# Patient Record
Sex: MALE | Race: WHITE | HISPANIC OR LATINO | Employment: OTHER | URBAN - METROPOLITAN AREA
[De-identification: names, ages, dates, MRNs, and addresses within clinical notes are randomized per-mention and may not be internally consistent; named-entity substitution may affect disease eponyms.]

---

## 2017-01-04 ENCOUNTER — HOSPITAL ENCOUNTER (EMERGENCY)
Facility: HOSPITAL | Age: 47
Discharge: HOME | End: 2017-01-05
Admitting: EMERGENCY MEDICINE
Payer: COMMERCIAL

## 2017-01-04 ENCOUNTER — APPOINTMENT (EMERGENCY)
Dept: RADIOLOGY | Facility: HOSPITAL | Age: 47
End: 2017-01-04
Payer: COMMERCIAL

## 2017-01-04 VITALS
TEMPERATURE: 97.8 F | OXYGEN SATURATION: 96 % | DIASTOLIC BLOOD PRESSURE: 70 MMHG | WEIGHT: 180 LBS | SYSTOLIC BLOOD PRESSURE: 144 MMHG | RESPIRATION RATE: 16 BRPM | HEART RATE: 98 BPM

## 2017-01-04 DIAGNOSIS — R09.81 NASAL CONGESTION: Primary | ICD-10-CM

## 2017-01-04 DIAGNOSIS — R05.9 COUGH: ICD-10-CM

## 2017-01-04 PROCEDURE — 71020 HB CHEST X-RAY 2VW FRONTAL&LATL: CPT

## 2017-01-05 PROCEDURE — 99283 EMERGENCY DEPT VISIT LOW MDM: CPT

## 2017-01-13 ENCOUNTER — ALLSCRIPTS OFFICE VISIT (OUTPATIENT)
Dept: OTHER | Facility: OTHER | Age: 47
End: 2017-01-13

## 2017-01-13 DIAGNOSIS — R51.9 HEADACHE: ICD-10-CM

## 2017-01-13 DIAGNOSIS — S06.9X9A INTRACRANIAL INJURY WITH LOSS OF CONSCIOUSNESS (HCC): ICD-10-CM

## 2017-01-13 DIAGNOSIS — M62.81 MUSCLE WEAKNESS (GENERALIZED): ICD-10-CM

## 2017-01-13 DIAGNOSIS — Z13.220 ENCOUNTER FOR SCREENING FOR LIPOID DISORDERS: ICD-10-CM

## 2017-02-02 ENCOUNTER — HOSPITAL ENCOUNTER (OUTPATIENT)
Dept: MRI IMAGING | Facility: HOSPITAL | Age: 47
Discharge: HOME | End: 2017-02-02
Payer: COMMERCIAL

## 2017-02-02 DIAGNOSIS — S06.9X9A INTRACRANIAL INJURY WITH LOSS OF CONSCIOUSNESS (HCC): ICD-10-CM

## 2017-02-02 DIAGNOSIS — R51.9 HEADACHE: ICD-10-CM

## 2017-02-02 DIAGNOSIS — M62.81 MUSCLE WEAKNESS (GENERALIZED): ICD-10-CM

## 2017-02-02 PROCEDURE — 70553 MRI BRAIN STEM W/O & W/DYE: CPT

## 2017-02-02 PROCEDURE — A9585 GADOBUTROL INJECTION: HCPCS | Performed by: PHYSICIAN ASSISTANT

## 2017-02-02 RX ADMIN — GADOBUTROL 10 ML: 604.72 INJECTION INTRAVENOUS at 21:00

## 2017-02-06 ENCOUNTER — GENERIC CONVERSION - ENCOUNTER (OUTPATIENT)
Dept: OTHER | Facility: OTHER | Age: 47
End: 2017-02-06

## 2017-02-14 ENCOUNTER — GENERIC CONVERSION - ENCOUNTER (OUTPATIENT)
Dept: OTHER | Facility: OTHER | Age: 47
End: 2017-02-14

## 2017-10-09 ENCOUNTER — APPOINTMENT (EMERGENCY)
Dept: RADIOLOGY | Facility: HOSPITAL | Age: 47
End: 2017-10-09
Payer: COMMERCIAL

## 2017-10-09 ENCOUNTER — APPOINTMENT (EMERGENCY)
Dept: CT IMAGING | Facility: HOSPITAL | Age: 47
End: 2017-10-09
Payer: COMMERCIAL

## 2017-10-09 ENCOUNTER — HOSPITAL ENCOUNTER (EMERGENCY)
Facility: HOSPITAL | Age: 47
Discharge: HOME | End: 2017-10-10
Attending: EMERGENCY MEDICINE | Admitting: EMERGENCY MEDICINE
Payer: COMMERCIAL

## 2017-10-09 VITALS
HEART RATE: 80 BPM | WEIGHT: 241.1 LBS | RESPIRATION RATE: 18 BRPM | SYSTOLIC BLOOD PRESSURE: 146 MMHG | TEMPERATURE: 99.3 F | OXYGEN SATURATION: 97 % | DIASTOLIC BLOOD PRESSURE: 86 MMHG

## 2017-10-09 DIAGNOSIS — R73.9 HYPERGLYCEMIA: ICD-10-CM

## 2017-10-09 DIAGNOSIS — S06.0X9A CONCUSSION: Primary | ICD-10-CM

## 2017-10-09 DIAGNOSIS — S63.501A RIGHT WRIST SPRAIN: ICD-10-CM

## 2017-10-09 LAB
ANION GAP BLD CALC-SCNC: 17 MMOL/L (ref 4–13)
BUN BLD-MCNC: 14 MG/DL (ref 5–25)
CA-I BLD-SCNC: 1.14 MMOL/L (ref 1.12–1.32)
CHLORIDE BLD-SCNC: 102 MMOL/L (ref 100–108)
CREAT BLD-MCNC: 1.3 MG/DL (ref 0.6–1.3)
GFR SERPL CREATININE-BSD FRML MDRD: 65 ML/MIN/1.73SQ M
GLUCOSE SERPL-MCNC: 226 MG/DL (ref 65–140)
GLUCOSE SERPL-MCNC: 250 MG/DL (ref 65–140)
HCT VFR BLD CALC: 42 % (ref 36.5–49.3)
HGB BLDA-MCNC: 14.3 G/DL (ref 12–17)
PCO2 BLD: 27 MMOL/L (ref 21–32)
POTASSIUM BLD-SCNC: 3.6 MMOL/L (ref 3.5–5.3)
SODIUM BLD-SCNC: 141 MMOL/L (ref 136–145)
SPECIMEN SOURCE: ABNORMAL

## 2017-10-09 PROCEDURE — 85014 HEMATOCRIT: CPT

## 2017-10-09 PROCEDURE — 73502 X-RAY EXAM HIP UNI 2-3 VIEWS: CPT

## 2017-10-09 PROCEDURE — 93005 ELECTROCARDIOGRAM TRACING: CPT | Performed by: EMERGENCY MEDICINE

## 2017-10-09 PROCEDURE — 82948 REAGENT STRIP/BLOOD GLUCOSE: CPT

## 2017-10-09 PROCEDURE — 73110 X-RAY EXAM OF WRIST: CPT

## 2017-10-09 PROCEDURE — 80047 BASIC METABLC PNL IONIZED CA: CPT

## 2017-10-09 PROCEDURE — 70450 CT HEAD/BRAIN W/O DYE: CPT

## 2017-10-09 RX ORDER — QUETIAPINE FUMARATE 25 MG/1
25 TABLET, FILM COATED ORAL
COMMUNITY
End: 2018-01-31 | Stop reason: DRUGHIGH

## 2017-10-09 RX ORDER — DIVALPROEX SODIUM 125 MG/1
250 TABLET, DELAYED RELEASE ORAL EVERY 8 HOURS SCHEDULED
COMMUNITY
End: 2018-01-31 | Stop reason: DRUGHIGH

## 2017-10-10 PROCEDURE — 99285 EMERGENCY DEPT VISIT HI MDM: CPT

## 2017-10-10 NOTE — ED NOTES
Patient ambulatory to restroom with steady coordinated gait          [address], RN  10/09/17 2008

## 2017-10-10 NOTE — ED ATTENDING ATTESTATION
Carrie Jenkins DO, saw and evaluated the patient  I have discussed the patient with the resident/non-physician practitioner and agree with the resident's/non-physician practitioner's findings, Plan of Care, and MDM as documented in the resident's/non-physician practitioner's note, except where noted  All available labs and Radiology studies were reviewed  At this point I agree with the current assessment done in the Emergency Department  I have conducted an independent evaluation of this patient a history and physical is as follows:  Patient with trip and fall while walking  Since fall, patient with persistent dizziness and right hip pain  Denies numbness or tingling  The patient denies any preceding lightheadedness, dizziness, chest pain, pressure, shortness of breath focal neurologic deficit  The patient has right wrist pain since the fall on his outstretched hands  The remainder of a 10 point review of systems was otherwise unremarkable  On examination, the patient is awake alert no acute distress  The patient has some mild tenderness to the superior aspect of the right parietal region  There is no deformity consistent with a skull fracture  The patient does not have hemotympanum or CSF leakage from the bilateral ears  There is no midline vertebral tenderness in the patient has full range of motion without pain in the midline cervical spine  The patient does have some associated right paracervical muscle tenderness  Lungs are clear to auscultation bilaterally  Heart is regular without murmurs rubs or gallops  The abdomen is soft and nontender without rebound or guarding  The patient has mild tenderness without deformity to the right wrist and right hip  The patient has grossly normal neurologic function without focal deficit  The patient's mood and affect are appropriate    I reviewed the x-rays and laboratories with resident    We discussed the possible small radial styloid fracture the patient will be placed in a splint for with plan for orthopedic follow-up    Diagnosis:  Concussion, right wrist sprain, possible fracture          Plan for [facility], labs, EKG, x-rays    Critical Care Time  CritCare Time

## 2017-10-10 NOTE — DISCHARGE INSTRUCTIONS
Concussion   WHAT YOU NEED TO KNOW:   A concussion is a mild brain injury  It is usually caused by a bump or blow to the head from a fall, a motor vehicle crash, or a sports injury  Sometimes being shaken forcefully may cause a concussion  DISCHARGE INSTRUCTIONS:   Have someone else call 911 for the following:   · Someone tries to wake you and cannot do so  · You have a seizure, increasing confusion, or a change in personality  · Your speech becomes slurred, or you have new vision problems  Return to the emergency department if:   · You have a severe headache that does not go away  · You have arm or leg weakness, numbness, or new problems with coordination  · You have blood or clear fluid coming out of the ears or nose  Contact your healthcare provider if:   · You have nausea or are vomiting  · You feel more sleepy than usual     · Your symptoms get worse  · Your symptoms last longer than 6 weeks after the injury  · You have questions or concerns about your condition or care  Medicines:   · Acetaminophen  helps to decrease pain  It is available without a doctor's order  Ask how much to take and how often to take it  Follow directions  Acetaminophen can cause liver damage if not taken correctly  · NSAIDs , such as ibuprofen, help decrease swelling and pain  NSAIDs can cause stomach bleeding or kidney problems in certain people  If you take blood thinner medicine, always ask your healthcare provider if NSAIDs are safe for you  Always read the medicine label and follow directions  · Take your medicine as directed  Contact your healthcare provider if you think your medicine is not helping or if you have side effects  Tell him or her if you are allergic to any medicine  Keep a list of the medicines, vitamins, and herbs you take  Include the amounts, and when and why you take them  Bring the list or the pill bottles to follow-up visits   Carry your medicine list with you in case of an emergency  Follow up with your healthcare provider as directed:  Write down your questions so you remember to ask them during your visits  Self-care:   · Rest  from physical and mental activities as directed  Mental activities are those that require thinking, concentration, and attention  You will need to rest until your symptoms are gone  Rest will allow you to recover from your concussion  Ask your healthcare provider when you can return to work and other daily activities  · Have someone stay with you for the first 24 hours after your injury  Your healthcare provider should be contacted if your symptoms get worse, or you develop new symptoms  · Do not participate in sports and physical activities until your healthcare provider says it is okay  They could make your symptoms worse or lead to another concussion  Your healthcare provider will tell you when it is okay for you to return to sports or physical activities  Prevent another concussion:   · Wear protective sports equipment that fit properly  Helmets help decrease your risk of a serious brain injury  Talk to your healthcare provider about ways you can decrease your risk for a concussion if you play sports  · Wear your seat belt  every time you travel  This helps to decrease your risk of a head injury if you are in a car accident  © 2017 [facility] Information is for End User's use only and may not be sold, redistributed or otherwise used for commercial purposes  All illustrations and images included in CareNotes® are the copyrighted property of [employer] , [employer]  or Adarsh Manzo  The above information is an  only  It is not intended as medical advice for individual conditions or treatments  Talk to your doctor, nurse or pharmacist before following any medical regimen to see if it is safe and effective for you

## 2017-10-10 NOTE — ED NOTES
Per Dr Kelvin Mcdermott, pt to receive PO fluids at this time       Jennifer Sosa, REMBERTO  10/09/17 5351

## 2017-10-10 NOTE — ED NOTES
Jagdish Childress, ED tech at bedside to place splint on patient       Sagrario Soto RN  10/10/17 4076

## 2017-10-10 NOTE — ED PROVIDER NOTES
History  Chief Complaint   Patient presents with    Dizziness     EMS found pt on sidewalk, pt reports getting dizzy and falling down  Pt reports hx of "dizzy spells"  Reports hitting head and +LOC, denies taking thinners  This is a 70-year-old male presenting to the emergency department for dizziness lightheadedness after a fall  The patient was walking in the dark and tripped over a curb  He fell onto his outstretched right hand and his right side and also hit his head  He states that since then he has had some dizziness and lightheadedness  He has had no blurry vision double vision or numbness weakness or tingling  Prior to Admission Medications   Prescriptions Last Dose Informant Patient Reported? Taking? QUEtiapine (SEROquel) 25 mg tablet   Yes Yes   Sig: Take 25 mg by mouth daily at bedtime   divalproex sodium (DEPAKOTE) 125 mg EC tablet   Yes Yes   Sig: Take 250 mg by mouth every 8 (eight) hours      Facility-Administered Medications: None       History reviewed  No pertinent past medical history  History reviewed  No pertinent surgical history  History reviewed  No pertinent family history  I have reviewed and agree with the history as documented  Social History   Substance Use Topics    Smoking status: Never Smoker    Smokeless tobacco: Not on file    Alcohol use No        Review of Systems   Constitutional: Negative for appetite change, chills, fatigue and fever  HENT: Negative for sneezing and sore throat  Eyes: Negative for visual disturbance  Respiratory: Negative for cough, choking, chest tightness, shortness of breath and wheezing  Cardiovascular: Negative for chest pain and palpitations  Gastrointestinal: Negative for abdominal pain, constipation, diarrhea, nausea and vomiting  Genitourinary: Negative for difficulty urinating and dysuria  Musculoskeletal: Positive for arthralgias  Neurological: Positive for dizziness and light-headedness  Negative for weakness, numbness and headaches  All other systems reviewed and are negative  Physical Exam  ED Triage Vitals [10/09/17 1946]   Temperature Pulse Respirations Blood Pressure SpO2   99 3 °F (37 4 °C) 89 16 (!) 171/98 100 %      Temp Source Heart Rate Source Patient Position - Orthostatic VS BP Location FiO2 (%)   Oral Monitor Lying Right arm --      Pain Score       5           Physical Exam   Constitutional: He is oriented to person, place, and time  He appears well-developed and well-nourished  No distress  HENT:   Head: Normocephalic and atraumatic  Mouth/Throat: Oropharynx is clear and moist    Eyes: EOM are normal  Pupils are equal, round, and reactive to light  Neck: No JVD present  No tracheal deviation present  Cardiovascular: Normal rate, regular rhythm, normal heart sounds and intact distal pulses  Exam reveals no gallop and no friction rub  No murmur heard  Pulmonary/Chest: Effort normal and breath sounds normal  No respiratory distress  He has no wheezes  He has no rales  Abdominal: Soft  Bowel sounds are normal  He exhibits no distension  There is no tenderness  There is no rebound and no guarding  Musculoskeletal:        Arms:  Neurological: He is alert and oriented to person, place, and time  He has normal strength  No cranial nerve deficit or sensory deficit  He exhibits normal muscle tone  GCS eye subscore is 4  GCS verbal subscore is 5  GCS motor subscore is 6  Skin: Skin is warm and dry  He is not diaphoretic  No pallor  Psychiatric: He has a normal mood and affect  His behavior is normal    Nursing note and vitals reviewed        ED Medications  Medications - No data to display    Diagnostic Studies  Labs Reviewed   POCT GLUCOSE - Abnormal        Result Value Ref Range Status    POC Glucose 226 (*) 65 - 140 mg/dl Final   POCT CHEM 8+ - Abnormal     Anion Gap, Istat 17 (*) 4 - 13 mmol/L Final    Glucose, i-STAT 250 (*) 65 - 140 mg/dl Final    SODIUM, I-STAT 141  136 - 145 mmol/l Final    Potassium, i-STAT 3 6  3 5 - 5 3 mmol/L Final    Chloride, istat 102  100 - 108 mmol/L Final    CO2, i-STAT 27  21 - 32 mmol/L Final    Calcium, Ionized i-STAT 1 14  1 12 - 1 32 mmol/L Final    BUN, I-STAT 14  5 - 25 mg/dl Final    Creatinine, i-STAT 1 3  0 6 - 1 3 mg/dl Final    eGFR 65  ml/min/1 73sq m Final    Hct, i-STAT 42  36 5 - 49 3 % Final    Hgb, i-STAT 14 3  12 0 - 17 0 g/dl Final    Specimen Type VENOUS   Final       CT head without contrast   Final Result      No acute intracranial abnormality  Workstation performed: [id number]         XR wrist 3+ views RIGHT   ED Interpretation   Bone fragment noted just distal to the ulnar styloid  This is   age indeterminate  XR hip/pelv 2-3 vws right if performed   ED Interpretation   No acute fracture or dislocation          Procedures  Procedures      Phone Consults  ED Phone Contact    ED Course  ED Course                                MDM  Number of Diagnoses or Management Options  Concussion:   Hyperglycemia:   Right wrist sprain:   Diagnosis management comments: 51-year-old male with dizziness and lightheadedness after fall with positive head strike  No focal neurological deficits or midline neck tenderness No external signs of trauma  Likely mild concussion  Will CT head, x-ray painful joints, recheck EKG and labs given pre-hospital report of hyperglycemia    CritCare Time    Disposition  Final diagnoses:   Concussion   Right wrist sprain   Hyperglycemia     ED Disposition     ED Disposition Condition Comment    Discharge  Mechele Sides discharge to home/self care      Condition at discharge: Stable        Follow-up Information     Follow up With Specialties Details Why [address] , [address] Nurse Practitioner Call  [facility] [address]  Call As needed [facility]  [address] [address]  [phone]    MAGALYdhavalmoira Samanta, [address] [facility] Nurse Practitioner   [facility] [address]  [place] [address]  [phone]          Discharge Medication List as of 10/9/2017 11:51 PM      CONTINUE these medications which have NOT CHANGED    Details   divalproex sodium (DEPAKOTE) 125 mg EC tablet Take 250 mg by mouth every 8 (eight) hours, Historical Med      QUEtiapine (SEROquel) 25 mg tablet Take 25 mg by mouth daily at bedtime, Historical Med           No discharge procedures on file  ED Provider  Attending physically available and evaluated Mayuri Orozco I managed the patient along with the ED Attending      Electronically Signed by       Ashlie Raymundo MD  Resident  10/10/17 1361

## 2017-10-11 LAB
ATRIAL RATE: 92 BPM
P AXIS: 58 DEGREES
PR INTERVAL: 132 MS
QRS AXIS: 16 DEGREES
QRSD INTERVAL: 68 MS
QT INTERVAL: 348 MS
QTC INTERVAL: 430 MS
T WAVE AXIS: 35 DEGREES
VENTRICULAR RATE: 92 BPM

## 2017-10-13 ENCOUNTER — GENERIC CONVERSION - ENCOUNTER (OUTPATIENT)
Dept: OTHER | Facility: OTHER | Age: 47
End: 2017-10-13

## 2017-10-13 DIAGNOSIS — S06.0X0A CONCUSSION WITHOUT LOSS OF CONSCIOUSNESS: ICD-10-CM

## 2017-10-13 DIAGNOSIS — H53.9 VISUAL DISTURBANCE: ICD-10-CM

## 2017-10-16 ENCOUNTER — TRANSCRIBE ORDERS (OUTPATIENT)
Dept: ADMINISTRATIVE | Facility: HOSPITAL | Age: 47
End: 2017-10-16

## 2017-10-16 DIAGNOSIS — S06.0X0A CONCUSSION WITHOUT LOSS OF CONSCIOUSNESS, INITIAL ENCOUNTER: Primary | ICD-10-CM

## 2017-10-17 ENCOUNTER — TRANSCRIBE ORDERS (OUTPATIENT)
Dept: LAB | Facility: CLINIC | Age: 47
End: 2017-10-17

## 2017-10-17 ENCOUNTER — APPOINTMENT (OUTPATIENT)
Dept: LAB | Facility: CLINIC | Age: 47
End: 2017-10-17
Payer: COMMERCIAL

## 2017-10-17 ENCOUNTER — ALLSCRIPTS OFFICE VISIT (OUTPATIENT)
Dept: OTHER | Facility: OTHER | Age: 47
End: 2017-10-17

## 2017-10-17 DIAGNOSIS — M62.830 MUSCLE SPASM OF BACK: ICD-10-CM

## 2017-10-17 DIAGNOSIS — R94.6 ABNORMAL RESULTS OF THYROID FUNCTION STUDIES: ICD-10-CM

## 2017-10-17 LAB
T3FREE SERPL-MCNC: 3.07 PG/ML (ref 2.3–4.2)
T4 FREE SERPL-MCNC: 1.05 NG/DL (ref 0.76–1.46)
TSH SERPL DL<=0.05 MIU/L-ACNC: 2.42 UIU/ML (ref 0.36–3.74)

## 2017-10-17 PROCEDURE — 84443 ASSAY THYROID STIM HORMONE: CPT

## 2017-10-17 PROCEDURE — 84481 FREE ASSAY (FT-3): CPT

## 2017-10-17 PROCEDURE — 84439 ASSAY OF FREE THYROXINE: CPT

## 2017-10-17 PROCEDURE — 36415 COLL VENOUS BLD VENIPUNCTURE: CPT

## 2017-10-18 ENCOUNTER — GENERIC CONVERSION - ENCOUNTER (OUTPATIENT)
Dept: OTHER | Facility: OTHER | Age: 47
End: 2017-10-18

## 2017-10-19 NOTE — PROGRESS NOTES
Assessment  1  Back muscle spasm (724 8) (M62 830)   2  Diabetes mellitus type II, controlled (250 00) (E11 9)   3  Vitamin D deficiency (268 9) (E55 9)   4  Abnormal TSH (790 6) (R94 6)   5  Diabetic eye exam (V72 0,250 00) (E11 9,Z01 00)   6  Headache (784 0) (R51)   7  Encounter for diabetic foot exam (250 00) (E11 9)    Plan  Abnormal TSH    · (1) FREE T3; Status:Active; Requested HGE:[id number];    · (1) T4, FREE; Status:Active; Requested for:17Oct2017;    · (1) TSH; Status:Active; Requested for:17Oct2017;   Back muscle spasm    · *1 - [facility]  *  Status: Active  Requested for: [id number]  Care Summary provided  : Yes  Diabetes mellitus type II, controlled    · Atorvastatin Calcium 10 MG Oral Tablet; TAKE 1 TABLET DAILY   · Lisinopril 5 MG Oral Tablet; take 1 tablet every day   · MetFORMIN HCl - 500 MG Oral Tablet; TAKE 1 TABLET TWICE DAILY  Diabetic eye exam    · *VB - Eye Exam; Status:Active; Requested for:17Oct2017;    · [address] (OPHTHALMOLOGY ) Co-Management  *  Status:  Active  Requested for: 12DBJ4168  Care Summary provided  : Yes  Encounter for diabetic foot exam    · *VB - Foot Exam; Status:Complete;   Done: 99QZF5970 12:14PM  Headache    · Baclofen 10 MG Oral Tablet; TAKE 1 TABLET 3 TIMES DAILY   · Ibuprofen 800 MG Oral Tablet; take 1 tablet every 8 hours prn pain  Vitamin D deficiency    · Vitamin D (Ergocalciferol) 39925 UNIT Oral Capsule; TAKE ONE CAPSULE BY  MOUTH WEEKLY    Discussion/Summary    Discussed with patient that back injuries may take several weeks to fully heal  We will refill ibuprofen for headaches and for the back pain  Also prescribed baclofen to see if that helps with symptoms  Gave referral to physical therapy further evaluation and treatment  with patient that his A1c is 7 2 and at this is a diagnosis of diabetes   Informed him at this time will start him on metformin, lisinopril, atorvastatin for control symptoms and also to decrease risk of MI and CVA  Discussed the importance of diet and exercise  Informed him that if he is able to control his diabetes with diet and exercise that we would be able to take him off the medications  The patient handout on appropriate diet  Gave referral to [facility] for diabetic eye exam patient that vitamin D was low  Discussed sources of vitamin-D  Since his vitamin-D was below 20 [place] start him on 89919 units weekly  patient that TSH was low  However there were no results for T4 so will check lab work to verify if there is any hypothyroidism and if so will start on medication  in 3 months  Possible side effects of new medications were reviewed with the patient/guardian today  The treatment plan was reviewed with the patient/guardian  The patient/guardian understands and agrees with the treatment plan     Self Referrals: No      Chief Complaint  Pt is here for refill med like ibuprofen also pt states has hurt his back yesterday falling into a hole on the sidewalk  History of Present Illness  53 y/o M presenting for low back pain  Patient states on 10/09/2017 he was walking and tripped on a hole in the sidewalk  He then went to the emergency room where he was given Tylenol and prednisone to help with his back pain  Imaging was done and came out negative  Patient has had back pain in the past, but since the fall has been getting worse  Describes as a throbbing sensation on the lower right side of his back  Pain can radiate up to his right shoulder  Believes the pain has been getting worse since the fall last week  States that excess walking has been making the pain worse  Denies any thing that makes the pain better  also had lab work recently done, and once know the results  Review of Systems    Constitutional: no fever,-- not feeling poorly,-- no chills-- and-- not feeling tired  Eyes: no eye pain-- and-- no eyesight problems     ENT: no earache,-- no sore throat,-- no hearing loss-- and-- no nasal discharge  Cardiovascular: No complaints of slow heart rate, no fast heart rate, no chest pain, no palpitations, no leg claudication, no lower extremity  Respiratory: No complaints of shortness of breath, no wheezing, no cough, no SOB on exertion, no orthopnea or PND  Gastrointestinal: No complaints of abdominal pain, no constipation, no nausea or vomiting, no diarrhea or bloody stools  Genitourinary: no dysuria,-- no urinary hesitancy-- and-- no nocturia  Musculoskeletal: as noted in HPI  Integumentary: no rashes-- and-- no skin lesions  Neurological: numbness-- and-- limb weakness, but-- no headache,-- no tingling-- and-- no dizziness  Psychiatric: no anxiety-- and-- no depression  Hematologic/Lymphatic: no tendency for easy bleeding-- and-- no tendency for easy bruising  ROS reviewed  Active Problems  1  Back muscle spasm (724 8) (M62 830)   2  Blurry vision (368 8) (H53 8)   3  Bronchitis (490) (J40)   4  Concussion without loss of consciousness, initial encounter (850 0) (S06 0X0A)   5  Headache (784 0) (R51)   6  Insomnia (780 52) (G47 00)   7  Need for prophylactic vaccination and inoculation against influenza (V04 81) (Z23)   8  Post-concussion headache (339 20) (G44 309)   9  Psychosis (298 9) (F29)   10  Screening for hyperlipidemia (V77 91) (Z13 220)   11  TBI (traumatic brain injury) (854 00) (S06 9X9A)   12  Visual disturbances (368 9) (H53 9)   13  Weakness of left side of body (728 87) (R53 1)    Past Medical History  1  Denied: History of drug abuse    Surgical History  1  History of Elevation Of Depressed Skull Fracture    Family History  Father    1  Family history of diabetes mellitus (V18 0) (Z83 3)    Social History   · Never smoker   · No alcohol use   · No Hindu beliefs    Current Meds   1  Depakote TBEC Recorded   2  Divalproex Sodium 500 MG Oral Tablet Delayed Release; TAKE 2 TABLETS AT BEDTIME;    Therapy: 75EOA5266 to (Evaluate:56Kin3815) Requested for: 87EHN8355; Last   Rx:13Jan2017 Ordered   3  Magnesium Oxide 400 MG Oral Tablet; TAKE 1 TABLET DAILY; Therapy: 39QWR4847 to (Evaluate:51Mqr4066)  Requested for: 06ZFQ1552; Last   Rx:13Oct2017 Ordered   4  PredniSONE 20 MG Oral Tablet; TAKE 1 TABLET DAILY x5 days then 1/2 tab daily x2 days; Therapy: 37VOM9361 to (Last Rx:13Oct2017)  Requested for: 13Oct2017 Ordered   5  QUEtiapine Fumarate 200 MG Oral Tablet; Take 1 tablet twice daily; Therapy: 36ZEI0290 to (Evaluate:26Cxt4785)  Requested for: 24XMY2832; Last   Rx:13Jan2017 Ordered   6  SEROquel 50 MG Oral Tablet Recorded    Allergies  1  No Known Drug Allergies    Vitals  Vital Signs    Recorded: 40NJB3194 09:08AM   Temperature 96 F, Tympanic   Heart Rate 66   Respiration 18   Systolic 457   Diastolic 80   Height 5 ft 6 in   Weight 244 lb    BMI Calculated 39 38   BSA Calculated 2 18   O2 Saturation 98     Physical Exam    Constitutional   General appearance: Abnormal   well developed,-- well nourished-- and-- obese  Eyes   Conjunctiva and lids: No swelling, erythema, or discharge  Pupils and irises: Equal, round and reactive to light  Ears, Nose, Mouth, and Throat   External inspection of ears and nose: Normal     Otoscopic examination: Tympanic membrance translucent with normal light reflex  Canals patent without erythema  Nasal mucosa, septum, and turbinates: Normal without edema or erythema  Oropharynx: Normal with no erythema, edema, exudate or lesions  Pulmonary   Respiratory effort: No increased work of breathing or signs of respiratory distress  Auscultation of lungs: Clear to auscultation, equal breath sounds bilaterally, no wheezes, no rales, no rhonci  Cardiovascular   Auscultation of heart: Normal rate and rhythm, normal S1 and S2, without murmurs  Examination of extremities for edema and/or varicosities: Normal     Carotid pulses: Normal     Abdomen   Abdomen: Non-tender, no masses      Liver and spleen: No hepatomegaly or splenomegaly  Lymphatic   Palpation of lymph nodes in neck: No lymphadenopathy  Musculoskeletal   Gait and station: Normal     Inspection/palpation of joints, bones, and muscles: Abnormal  -- Tenderness to palpation in right lower paraspinal area  Muscle tension noted in that area  Strength is 5/5 in right lower extremity and 4/5 in left lower extremity  Skin   Skin and subcutaneous tissue: Normal without rashes or lesions  Neurologic   Cranial nerves: Cranial nerves 2-12 intact  Reflexes: 2+ and symmetric  Sensation: Abnormal  -- Decreased sensation to light touch in left foot  Psychiatric   Orientation to person, place and time: Normal     Mood and affect: Normal         Socks and shoes removed, Right Foot Findings: dry  The right toes were normal  Normal tactile sensation with monofilament testing throughout the right foot  Socks and shoes removed, Left Foot Findings: dry  The left toes were normal  Diminished tactile sensation with monofilament testing throughout the left foot  Pulses:   1+ in the posterior tibialis on the right   1+ in the dorsalis pedis on the right  Pulses:   1+ in the posterior tibialis on the left   1+ in the dorsalis pedis on the left        Signatures   Electronically signed by : NICKY Holt; Oct 17 2017 12:16PM EST                       (Author)    Electronically signed by : CORRIE Reynoso ; Oct 18 2017  1:09PM EST

## 2017-11-13 ENCOUNTER — APPOINTMENT (OUTPATIENT)
Dept: PHYSICAL THERAPY | Facility: MEDICAL CENTER | Age: 47
End: 2017-11-13
Payer: COMMERCIAL

## 2017-11-13 PROCEDURE — G8991 OTHER PT/OT GOAL STATUS: HCPCS

## 2017-11-13 PROCEDURE — 97162 PT EVAL MOD COMPLEX 30 MIN: CPT

## 2017-11-13 PROCEDURE — G8990 OTHER PT/OT CURRENT STATUS: HCPCS

## 2017-11-17 ENCOUNTER — APPOINTMENT (OUTPATIENT)
Dept: PHYSICAL THERAPY | Facility: MEDICAL CENTER | Age: 47
End: 2017-11-17
Payer: COMMERCIAL

## 2017-11-20 ENCOUNTER — APPOINTMENT (OUTPATIENT)
Dept: PHYSICAL THERAPY | Facility: MEDICAL CENTER | Age: 47
End: 2017-11-20
Payer: COMMERCIAL

## 2017-11-20 PROCEDURE — 97110 THERAPEUTIC EXERCISES: CPT

## 2017-11-20 PROCEDURE — 97014 ELECTRIC STIMULATION THERAPY: CPT

## 2017-11-24 ENCOUNTER — APPOINTMENT (OUTPATIENT)
Dept: PHYSICAL THERAPY | Facility: MEDICAL CENTER | Age: 47
End: 2017-11-24
Payer: COMMERCIAL

## 2017-11-27 ENCOUNTER — APPOINTMENT (OUTPATIENT)
Dept: PHYSICAL THERAPY | Facility: MEDICAL CENTER | Age: 47
End: 2017-11-27
Payer: COMMERCIAL

## 2017-12-01 ENCOUNTER — APPOINTMENT (OUTPATIENT)
Dept: PHYSICAL THERAPY | Facility: MEDICAL CENTER | Age: 47
End: 2017-12-01
Payer: COMMERCIAL

## 2017-12-05 ENCOUNTER — APPOINTMENT (OUTPATIENT)
Dept: PHYSICAL THERAPY | Facility: MEDICAL CENTER | Age: 47
End: 2017-12-05
Payer: COMMERCIAL

## 2017-12-05 PROCEDURE — 97014 ELECTRIC STIMULATION THERAPY: CPT

## 2017-12-05 PROCEDURE — 97110 THERAPEUTIC EXERCISES: CPT

## 2017-12-08 ENCOUNTER — APPOINTMENT (OUTPATIENT)
Dept: PHYSICAL THERAPY | Facility: MEDICAL CENTER | Age: 47
End: 2017-12-08
Payer: COMMERCIAL

## 2017-12-11 ENCOUNTER — APPOINTMENT (OUTPATIENT)
Dept: PHYSICAL THERAPY | Facility: MEDICAL CENTER | Age: 47
End: 2017-12-11
Payer: COMMERCIAL

## 2017-12-11 PROCEDURE — 97110 THERAPEUTIC EXERCISES: CPT

## 2017-12-11 PROCEDURE — 97014 ELECTRIC STIMULATION THERAPY: CPT

## 2017-12-15 ENCOUNTER — APPOINTMENT (OUTPATIENT)
Dept: PHYSICAL THERAPY | Facility: MEDICAL CENTER | Age: 47
End: 2017-12-15
Payer: COMMERCIAL

## 2017-12-18 ENCOUNTER — APPOINTMENT (OUTPATIENT)
Dept: PHYSICAL THERAPY | Facility: MEDICAL CENTER | Age: 47
End: 2017-12-18
Payer: COMMERCIAL

## 2017-12-19 ENCOUNTER — APPOINTMENT (OUTPATIENT)
Dept: PHYSICAL THERAPY | Facility: MEDICAL CENTER | Age: 47
End: 2017-12-19
Payer: COMMERCIAL

## 2017-12-19 PROCEDURE — 97110 THERAPEUTIC EXERCISES: CPT

## 2017-12-19 PROCEDURE — 97014 ELECTRIC STIMULATION THERAPY: CPT

## 2017-12-19 PROCEDURE — 97140 MANUAL THERAPY 1/> REGIONS: CPT

## 2017-12-21 ENCOUNTER — APPOINTMENT (OUTPATIENT)
Dept: PHYSICAL THERAPY | Facility: MEDICAL CENTER | Age: 47
End: 2017-12-21
Payer: COMMERCIAL

## 2017-12-21 PROCEDURE — 97010 HOT OR COLD PACKS THERAPY: CPT

## 2017-12-21 PROCEDURE — 97110 THERAPEUTIC EXERCISES: CPT

## 2017-12-21 PROCEDURE — 97140 MANUAL THERAPY 1/> REGIONS: CPT

## 2017-12-26 ENCOUNTER — APPOINTMENT (OUTPATIENT)
Dept: PHYSICAL THERAPY | Facility: MEDICAL CENTER | Age: 47
End: 2017-12-26
Payer: COMMERCIAL

## 2017-12-26 PROCEDURE — 97014 ELECTRIC STIMULATION THERAPY: CPT

## 2017-12-26 PROCEDURE — 97110 THERAPEUTIC EXERCISES: CPT

## 2017-12-28 ENCOUNTER — APPOINTMENT (OUTPATIENT)
Dept: PHYSICAL THERAPY | Facility: MEDICAL CENTER | Age: 47
End: 2017-12-28
Payer: COMMERCIAL

## 2017-12-28 PROCEDURE — 97014 ELECTRIC STIMULATION THERAPY: CPT

## 2017-12-28 PROCEDURE — G8991 OTHER PT/OT GOAL STATUS: HCPCS | Performed by: PHYSICAL THERAPIST

## 2017-12-28 PROCEDURE — 97140 MANUAL THERAPY 1/> REGIONS: CPT

## 2017-12-28 PROCEDURE — G8990 OTHER PT/OT CURRENT STATUS: HCPCS | Performed by: PHYSICAL THERAPIST

## 2017-12-28 PROCEDURE — 97110 THERAPEUTIC EXERCISES: CPT

## 2018-01-04 ENCOUNTER — APPOINTMENT (OUTPATIENT)
Dept: LAB | Facility: CLINIC | Age: 48
End: 2018-01-04
Payer: COMMERCIAL

## 2018-01-04 ENCOUNTER — GENERIC CONVERSION - ENCOUNTER (OUTPATIENT)
Dept: OTHER | Facility: OTHER | Age: 48
End: 2018-01-04

## 2018-01-04 ENCOUNTER — ALLSCRIPTS OFFICE VISIT (OUTPATIENT)
Dept: OTHER | Facility: OTHER | Age: 48
End: 2018-01-04

## 2018-01-04 ENCOUNTER — APPOINTMENT (OUTPATIENT)
Dept: PHYSICAL THERAPY | Facility: MEDICAL CENTER | Age: 48
End: 2018-01-04
Payer: COMMERCIAL

## 2018-01-04 ENCOUNTER — TRANSCRIBE ORDERS (OUTPATIENT)
Dept: LAB | Facility: CLINIC | Age: 48
End: 2018-01-04

## 2018-01-04 DIAGNOSIS — R53.1 WEAKNESS: ICD-10-CM

## 2018-01-04 DIAGNOSIS — E11.9 TYPE 2 DIABETES MELLITUS WITHOUT COMPLICATIONS (HCC): ICD-10-CM

## 2018-01-04 DIAGNOSIS — M54.9 DORSALGIA: ICD-10-CM

## 2018-01-04 LAB
ALBUMIN SERPL BCP-MCNC: 4 G/DL (ref 3.5–5)
ALP SERPL-CCNC: 96 U/L (ref 46–116)
ALT SERPL W P-5'-P-CCNC: 60 U/L (ref 12–78)
ANION GAP SERPL CALCULATED.3IONS-SCNC: 6 MMOL/L (ref 4–13)
AST SERPL W P-5'-P-CCNC: 33 U/L (ref 5–45)
BACTERIA UR QL AUTO: ABNORMAL /HPF
BASOPHILS # BLD AUTO: 0.04 THOUSANDS/ΜL (ref 0–0.1)
BASOPHILS NFR BLD AUTO: 1 % (ref 0–1)
BILIRUB SERPL-MCNC: 0.58 MG/DL (ref 0.2–1)
BILIRUB UR QL STRIP: NEGATIVE
BUN SERPL-MCNC: 13 MG/DL (ref 5–25)
CALCIUM SERPL-MCNC: 9.1 MG/DL (ref 8.3–10.1)
CHLORIDE SERPL-SCNC: 104 MMOL/L (ref 100–108)
CHOLEST SERPL-MCNC: 207 MG/DL (ref 50–200)
CLARITY UR: CLEAR
CO2 SERPL-SCNC: 27 MMOL/L (ref 21–32)
COLOR UR: YELLOW
CREAT SERPL-MCNC: 1.11 MG/DL (ref 0.6–1.3)
CREAT UR-MCNC: 176 MG/DL
EOSINOPHIL # BLD AUTO: 0.51 THOUSAND/ΜL (ref 0–0.61)
EOSINOPHIL NFR BLD AUTO: 9 % (ref 0–6)
ERYTHROCYTE [DISTWIDTH] IN BLOOD BY AUTOMATED COUNT: 13.8 % (ref 11.6–15.1)
GFR SERPL CREATININE-BSD FRML MDRD: 79 ML/MIN/1.73SQ M
GLUCOSE P FAST SERPL-MCNC: 122 MG/DL (ref 65–99)
GLUCOSE UR STRIP-MCNC: NEGATIVE MG/DL
HBA1C MFR BLD HPLC: 6.9 %
HCT VFR BLD AUTO: 46.2 % (ref 36.5–49.3)
HDLC SERPL-MCNC: 43 MG/DL (ref 40–60)
HGB BLD-MCNC: 15 G/DL (ref 12–17)
HGB UR QL STRIP.AUTO: NEGATIVE
HYALINE CASTS #/AREA URNS LPF: ABNORMAL /LPF
KETONES UR STRIP-MCNC: NEGATIVE MG/DL
LDLC SERPL CALC-MCNC: 138 MG/DL (ref 0–100)
LEUKOCYTE ESTERASE UR QL STRIP: ABNORMAL
LYMPHOCYTES # BLD AUTO: 2.26 THOUSANDS/ΜL (ref 0.6–4.47)
LYMPHOCYTES NFR BLD AUTO: 39 % (ref 14–44)
MCH RBC QN AUTO: 26.9 PG (ref 26.8–34.3)
MCHC RBC AUTO-ENTMCNC: 32.5 G/DL (ref 31.4–37.4)
MCV RBC AUTO: 83 FL (ref 82–98)
MICROALBUMIN UR-MCNC: 18.2 MG/L (ref 0–20)
MICROALBUMIN/CREAT 24H UR: 10 MG/G CREATININE (ref 0–30)
MONOCYTES # BLD AUTO: 0.4 THOUSAND/ΜL (ref 0.17–1.22)
MONOCYTES NFR BLD AUTO: 7 % (ref 4–12)
NEUTROPHILS # BLD AUTO: 2.59 THOUSANDS/ΜL (ref 1.85–7.62)
NEUTS SEG NFR BLD AUTO: 44 % (ref 43–75)
NITRITE UR QL STRIP: NEGATIVE
NON-SQ EPI CELLS URNS QL MICRO: ABNORMAL /HPF
NRBC BLD AUTO-RTO: 0 /100 WBCS
PH UR STRIP.AUTO: 6 [PH] (ref 4.5–8)
PLATELET # BLD AUTO: 277 THOUSANDS/UL (ref 149–390)
PMV BLD AUTO: 10.4 FL (ref 8.9–12.7)
POTASSIUM SERPL-SCNC: 4.2 MMOL/L (ref 3.5–5.3)
PROT SERPL-MCNC: 8.3 G/DL (ref 6.4–8.2)
PROT UR STRIP-MCNC: NEGATIVE MG/DL
RBC # BLD AUTO: 5.57 MILLION/UL (ref 3.88–5.62)
RBC #/AREA URNS AUTO: ABNORMAL /HPF
SODIUM SERPL-SCNC: 137 MMOL/L (ref 136–145)
SP GR UR STRIP.AUTO: 1.02 (ref 1–1.03)
TRIGL SERPL-MCNC: 128 MG/DL
UROBILINOGEN UR QL STRIP.AUTO: 0.2 E.U./DL
WBC # BLD AUTO: 5.85 THOUSAND/UL (ref 4.31–10.16)
WBC #/AREA URNS AUTO: ABNORMAL /HPF

## 2018-01-04 PROCEDURE — 80053 COMPREHEN METABOLIC PANEL: CPT

## 2018-01-04 PROCEDURE — 82570 ASSAY OF URINE CREATININE: CPT

## 2018-01-04 PROCEDURE — 87086 URINE CULTURE/COLONY COUNT: CPT

## 2018-01-04 PROCEDURE — 80061 LIPID PANEL: CPT

## 2018-01-04 PROCEDURE — 81001 URINALYSIS AUTO W/SCOPE: CPT

## 2018-01-04 PROCEDURE — 82043 UR ALBUMIN QUANTITATIVE: CPT

## 2018-01-04 PROCEDURE — 85025 COMPLETE CBC W/AUTO DIFF WBC: CPT

## 2018-01-04 PROCEDURE — 36415 COLL VENOUS BLD VENIPUNCTURE: CPT

## 2018-01-05 NOTE — PROGRESS NOTES
Assessment   1  Chronic back pain (724 5,338 29) (M54 9,G89 29)   2  Diabetes mellitus type II, controlled (250 00) (E11 9)   3  Visual disturbances (368 9) (H53 9)    Plan   Chronic back pain    · Methocarbamol 500 MG Oral Tablet; TAKE 1 TABLET 3 TIMES DAILY   · Naproxen 500 MG Oral Tablet; take 1 tablet every 12 hours with food as needed   · *1 - [facility]  *  Status: Active  Requested for: 57XSR6851  Care Summary provided  : Yes  Diabetes mellitus type II, controlled    · MetFORMIN HCl - 500 MG Oral Tablet; Take 1 tablet daily   · Hemoglobin A1c- POC; Status:Complete - Retrospective By Protocol Authorization;      Done: 21GHW4936 11:11AM  Headache    · Baclofen 10 MG Oral Tablet   · Ibuprofen 800 MG Oral Tablet    Discussion/Summary      Discussed with patient that A1c has decreased from 7 2% to 6 9%  Discussed with patient that diet and exercise is important in controlling his diabetes  At this time recommend that he still take medication  Was able to have patient agree to taking metformin once a day to decrease the amount of medications that he is taking, in hopes to increase his compliance  Discussed the importance of taking lisinopril and atorvastatin to prevent complications from his diabetes  Complications can include CVA or MI  with patient the importance of following up with ophthalmology for blurry vision  there has been little improvement in his back pain with current medications and also physical therapy will order MRI of spine for further evaluation  Will change medication to naproxen and Robaxin to see if this helps with symptoms  Continue following up with physical therapy for treatment of back pain  Depending on results of MRI will refer to Pain Management  Possible side effects of new medications were reviewed with the patient/guardian today  The treatment plan was reviewed with the patient/guardian   The patient/guardian understands and agrees with the treatment plan Self Referrals: No      Chief Complaint   Patient presents today for routine DM FU, also c/o back pain, currently on therapy  History of Present Illness   53 yo M with history of T2 DM, chronic low back pain s/p injury and depressed skull fracture presenting for follow up  At his last visit, the patient was prescribed Ibuprofen, Baclofen and given a referral to physical therapy for his back pain  He states the Ibuprofen provides only mild relief and he has not been taking the Baclofen regularly  The Ibuprofen is helping his headaches, however  Patient has attended 9 sessions of physical therapy also with mild relief and was told he would need another referral to complete another 9 sessions  He would like something stronger for his back pain  At last visit the patient's A1c was found to be 7 2 and he was diagnosed with diabetes  He was prescribed Metformin, Atorvastatin and Lisinopril however states he has not been compliant with the medications because there are too many to take  Patient also reports he has not been watching his diet or exercising  He reports he has been having polyuria, polydipsia and nocturia  Patient notes occasional blurry vision however has not yet been seen by the eye doctor  Numbness, weakness and loss of sensation in LUE and LLE are chronic s/p his traumatic brain injury  He offers no further complaints at this time  Review of Systems        Constitutional: No fever or chills, feels well, no tiredness, no recent weight gain or weight loss  The patient presents with complaints of eyesight problems, described as blurry vision  ENT: no complaints of earache, no hearing loss, no nosebleeds, no nasal discharge, no sore throat, no hoarseness  Cardiovascular: No complaints of slow heart rate, no fast heart rate, no chest pain, no palpitations, no leg claudication, no lower extremity        Respiratory: No complaints of shortness of breath, no wheezing, no cough, no SOB on exertion, no orthopnea or PND  Gastrointestinal: No complaints of abdominal pain, no constipation, no nausea or vomiting, no diarrhea or bloody stools  Musculoskeletal: myalgias  Neurological: headache,-- numbness-- and-- limb weakness, but-- as noted in HPI  ROS reviewed  Active Problems   1  Abnormal TSH (790 6) (R94 6)   2  Back muscle spasm (724 8) (M62 830)   3  Blurry vision (368 8) (H53 8)   4  Bronchitis (490) (J40)   5  Concussion without loss of consciousness, initial encounter (850 0) (S06 0X0A)   6  Diabetes mellitus type II, controlled (250 00) (E11 9)   7  Diabetic eye exam (V72 0,250 00) (E11 9,Z01 00)   8  Encounter for diabetic foot exam (250 00) (E11 9)   9  Headache (784 0) (R51)   10  Insomnia (780 52) (G47 00)   11  Need for prophylactic vaccination and inoculation against influenza (V04 81) (Z23)   12  Post-concussion headache (339 20) (G44 309)   13  Psychosis (298 9) (F29)   14  Screening for hyperlipidemia (V77 91) (Z13 220)   15  TBI (traumatic brain injury) (854 00) (S06 9X9A)   16  Visual disturbances (368 9) (H53 9)   17  Vitamin D deficiency (268 9) (E55 9)   18  Weakness of left side of body (728 87) (R53 1)    Past Medical History   1  Denied: History of drug abuse   2  History of psychosis (V11 8) (Z86 59)    Surgical History   1  History of Elevation Of Depressed Skull Fracture    Family History   Father    1  Family history of diabetes mellitus (V18 0) (Z83 3)    Social History    · Never smoker   · No alcohol use   · No Yazdanism beliefs    Current Meds    1  Atorvastatin Calcium 10 MG Oral Tablet; TAKE 1 TABLET DAILY; Therapy: 13AVV6238 to (Evaluate:07Agy4308)  Requested for: 81ESR4223; Last     Rx:17Oct2017 Ordered   2  Baclofen 10 MG Oral Tablet; TAKE 1 TABLET 3 TIMES DAILY; Therapy: 28RFH6671 to (Evaluate:34Rsw7041)  Requested for: 05OUX4257; Last     Rx:17Oct2017 Ordered   3  Depakote TBEC Recorded   4   Divalproex Sodium 500 MG Oral Tablet Delayed Release; TAKE 2 TABLETS AT BEDTIME; Therapy: 71VUG8925 to (Evaluate:56Tdp7510)  Requested for: 29QGB7876; Last     Rx:13Jan2017 Ordered   5  FreeStyle Lancets Miscellaneous; TEST DAILY; Therapy: 16GCT9273 to (Last Rx:19Oct2017)  Requested for: 19Oct2017 Ordered   6  FreeStyle Lite Device; USE AS DIRECTED; Therapy: 14QZO9502 to (Last Rx:19Oct2017)  Requested for: 19Oct2017 Ordered   7  FreeStyle Lite Test In Vitro Strip; TEST ONCE DAILY; Therapy: 79SWK3259 to (Last Rx:19Oct2017)  Requested for: 19Oct2017 Ordered   8  Ibuprofen 800 MG Oral Tablet; take 1 tablet every 8 hours prn pain; Therapy: 99OVW4600 to (Evaluate:15Jan2018)  Requested for: 55SQR1053; Last     Rx:17Oct2017 Ordered   9  Lisinopril 5 MG Oral Tablet; take 1 tablet every day; Therapy: 42OPQ5572 to (Evaluate:15Apr2018)  Requested for: 48CLW0807; Last     Rx:17Oct2017 Ordered   10  Magnesium Oxide 400 MG Oral Tablet; TAKE 1 TABLET DAILY; Therapy: 38LZX6761 to (Evaluate:10Feb2018)  Requested for: 29UAP4311; Last      Rx:13Oct2017 Ordered   11  MetFORMIN HCl - 500 MG Oral Tablet; TAKE 1 TABLET TWICE DAILY; Therapy: 20FGB5136 to (Montserrat Roberts)  Requested for: 86JIQ4576; Last      Rx:02Bgz1837 Ordered   12  PredniSONE 20 MG Oral Tablet; TAKE 1 TABLET DAILY x5 days then 1/2 tab daily x2 days; Therapy: 50OGG8314 to (Last Rx:13Oct2017)  Requested for: 13Oct2017 Ordered   13  QUEtiapine Fumarate 200 MG Oral Tablet; Take 1 tablet twice daily; Therapy: 17XNY7334 to (Evaluate:12Feb2017)  Requested for: 24HBV4481; Last      Rx:13Jan2017 Ordered   14  SEROquel 50 MG Oral Tablet Recorded   15  Vitamin D (Ergocalciferol) 92404 UNIT Oral Capsule; TAKE ONE CAPSULE BY MOUTH      WEEKLY; Therapy: 64XCC6405 to (Evaluate:39Bau6273)  Requested for: 78LVM4150; Last      Rx:17Oct2017 Ordered     The medication list was reviewed and updated today  Allergies   1   No Known Drug Allergies    Vitals   Vital Signs Recorded: 31CVQ8893 10:14AM   Temperature 95 8 F   Heart Rate 72   Respiration 20   Systolic 328   Diastolic 94   Height 5 ft 6 in   Weight 245 lb 7 oz   BMI Calculated 39 61   BSA Calculated 2 18   O2 Saturation 98     Physical Exam        Constitutional      General appearance: No acute distress, well appearing and well nourished  Eyes      Pupils and irises: Equal, round and reactive to light  Ears, Nose, Mouth, and Throat      Oropharynx: Normal with no erythema, edema, exudate or lesions  Pulmonary      Auscultation of lungs: Clear to auscultation, equal breath sounds bilaterally, no wheezes, no rales, no rhonci  Cardiovascular      Auscultation of heart: Normal rate and rhythm, normal S1 and S2, without murmurs  Abdomen      Abdomen: Non-tender, no masses  Musculoskeletal      Inspection/palpation of joints, bones, and muscles: Abnormal  -- Lower back paralumbar TTP  Strength 5/5 in RLE and 4/5 in LLE  Neurologic      Reflexes: 2+ and symmetric  Sensation: Abnormal  -- Decreased sensation plantar aspect of left foot  Socks and shoes removed, Right Foot Findings: normal foot, no swelling, no erythema  The sensory exam showed normal vibratory sensation at the level of the toes on the right  Socks and shoes removed, Left Foot Findings: normal foot, no swelling, no erythema  Pulses:      2+ in the posterior tibialis on the right      2+ in the dorsalis pedis on the right  Pulses:      2+ in the posterior tibialis on the left      2+ in the dorsalis pedis on the left        Results/Data   Hemoglobin A1c- POC 61QCO1652 11:11AM Neal Dasilva      Test Name Result Flag Reference   HEMOGLOBIN A1C 6 9          Signatures    Electronically signed by : NICKY Wynn; Jan 4 2018 11:38AM EST                       (Author)     Electronically signed by : CORRIE Francisco ; Jan 4 2018 12:44PM EST

## 2018-01-06 LAB — BACTERIA UR CULT: NORMAL

## 2018-01-09 ENCOUNTER — APPOINTMENT (OUTPATIENT)
Dept: PHYSICAL THERAPY | Facility: MEDICAL CENTER | Age: 48
End: 2018-01-09
Payer: COMMERCIAL

## 2018-01-09 ENCOUNTER — GENERIC CONVERSION - ENCOUNTER (OUTPATIENT)
Dept: FAMILY MEDICINE CLINIC | Facility: CLINIC | Age: 48
End: 2018-01-09

## 2018-01-09 PROCEDURE — 97530 THERAPEUTIC ACTIVITIES: CPT

## 2018-01-09 PROCEDURE — G8991 OTHER PT/OT GOAL STATUS: HCPCS

## 2018-01-09 PROCEDURE — 97110 THERAPEUTIC EXERCISES: CPT

## 2018-01-09 PROCEDURE — G8990 OTHER PT/OT CURRENT STATUS: HCPCS

## 2018-01-09 PROCEDURE — 97164 PT RE-EVAL EST PLAN CARE: CPT

## 2018-01-11 ENCOUNTER — APPOINTMENT (OUTPATIENT)
Dept: PHYSICAL THERAPY | Facility: MEDICAL CENTER | Age: 48
End: 2018-01-11
Payer: COMMERCIAL

## 2018-01-12 VITALS
RESPIRATION RATE: 18 BRPM | WEIGHT: 252 LBS | HEART RATE: 66 BPM | OXYGEN SATURATION: 97 % | DIASTOLIC BLOOD PRESSURE: 84 MMHG | SYSTOLIC BLOOD PRESSURE: 122 MMHG | BODY MASS INDEX: 40.5 KG/M2 | HEIGHT: 66 IN | TEMPERATURE: 95 F

## 2018-01-12 NOTE — RESULT NOTES
Verified Results  * MRI BRAIN W WO CONTRAST 70TZQ2130 06:16PM Judy Johnson Order Number: [id number]    - Patient Instructions: To schedule this appointment, please contact Central Scheduling at [phone]  Test Name Result Flag Reference   MRI BRAIN W WO CONTRAST (Report)     This is a summary report  The complete report is available in the patient's medical record  If you cannot access the medical record, please contact the sending organization for a detailed fax or copy  MRI BRAIN WITH AND WITHOUT CONTRAST     INDICATION: M62 81: Muscle weakness (generalized)   S06  9X9A: Unspecified intracranial injury with loss of consciousness of unspecified duration, initial encounter   R51: Headache  History taken directly from the electronic ordering system  COMPARISON: None  TECHNIQUE:   Sagittal T1, axial T2, axial FLAIR, axial T1, axial [place], axial diffusion  Sagittal, axial and coronal T1 postcontrast  Axial BRAVO post contrast     IV Contrast: gadobutrol injection (MULTI-DOSE) SOLN 10 mL Note: (SINGLE DOSE/MULTI DOSE) information refers to the container from which the contrast was acquired  Contrast was injected one time intravenously without immediate complication  IMAGE QUALITY:  Diagnostic  FINDINGS:    BRAIN PARENCHYMA:    There are no areas of restricted diffusion  No extra-axial fluid collection or midline shift  Question punctate focus of gradient echo susceptibility in the inferior right middle cranial fossa which could reflect the sequela of remote hemorrhage  Encephalomalacia is identified involving both the right medial and lateral orbital frontal gyri as well as the right temporal pole and right lateral temporal lobe  Pattern is most consistent with remote trauma  No abnormal enhancement is identified        Scattered punctate foci of increased T2 and FLAIR signal are noted in the supratentorial white matter which is a nonspecific finding and may represent the subtle sequelae of a remote insult  Cerebral volume is within normal limits for age  VENTRICLES: The ventricles are normal in size and contour  VASCULATURE: Major arterial and dural venous flow voids are preserved by spin echo criteria  PARANASAL SINUSES: Normal      ORBITS: Normal      EXTRACRANIAL SOFT TISSUES: Normal      SELLA AND PITUITARY GLAND: Hypothalamic and pituitary region are grossly normal       CALVARIUM AND SKULL BASE: Craniocervical junction is within normal limits  Marrow signal is within normal limits without signs of an infiltrative process  IMPRESSION:     No acute intracranial abnormality or pathologic enhancement  Sequela of remote right frontotemporal trauma         Workstation performed: [id number]     Signed by:   Yury Polanco MD   2/3/17

## 2018-01-12 NOTE — RESULT NOTES
Verified Results  (1) TSH 17Oct2017 09:36AM Valeriano Minor Order Number: [id number]     Test Name Result Flag Reference   TSH 2 420 uIU/mL  0 358-3 740   Patients undergoing fluorescein dye angiography may retain small amounts of fluorescein in the body for 48-72 hours post procedure  Samples containing fluorescein can produce falsely depressed TSH values  If the patient had this procedure,a specimen should be resubmitted post fluorescein clearance  (1) T4, FREE 06JON1663 09:36AM Splore Minor Order Number: [id number]     Test Name Result Flag Reference   T4,FREE 1 05 ng/dL  0 76-1 46   Specimen collection should occur prior to Sulfasalazine administration due to the potential for falsely elevated results       (1) FREE T3 58JCO7435 09:36AM Valeriano Minor Order Number: [id number]     Test Name Result Flag Reference   FREE T3 3 07 pg/mL  2 30-4 20

## 2018-01-13 VITALS
RESPIRATION RATE: 18 BRPM | BODY MASS INDEX: 39.21 KG/M2 | OXYGEN SATURATION: 98 % | DIASTOLIC BLOOD PRESSURE: 80 MMHG | HEIGHT: 66 IN | TEMPERATURE: 96 F | SYSTOLIC BLOOD PRESSURE: 122 MMHG | HEART RATE: 66 BPM | WEIGHT: 244 LBS

## 2018-01-13 NOTE — MISCELLANEOUS
Message  I attempted to contact the patient regarding his lab result but have been unsuccessful  I sent the patient a letter to contact our office at his earliest convenience  [id number]      Active Problems    1  Back muscle spasm (724 8) (M62 830)   2  Blurry vision (368 8) (H53 8)   3  Bronchitis (490) (J40)   4  Headache (784 0) (R51)   5  Insomnia (780 52) (G47 00)   6  Need for prophylactic vaccination and inoculation against influenza (V04 81) (Z23)   7  Psychosis (298 9) (F29)   8  Screening for hyperlipidemia (V77 91) (Z13 220)   9  TBI (traumatic brain injury) (854 00) (S06 9X9A)   10  Weakness of left side of body (728 87) (M62 81)    Current Meds   1  Divalproex Sodium 500 MG Oral Tablet Delayed Release; TAKE 2 TABLETS AT   BEDTIME; Therapy: 18SLB9871 to (Evaluate:12Feb2017)  Requested for: 82YAV1816; Last   Rx:13Jan2017 Ordered   2  Naproxen 500 MG Oral Tablet; take 1 tablet every 12 hours with food as needed; Therapy: 58ZIN5693 to (Evaluate:13Apr2017)  Requested for: 06SUU4280; Last   Rx:13Jan2017 Ordered   3  QUEtiapine Fumarate 200 MG Oral Tablet; Take 1 tablet twice daily; Therapy: 40TWP5829 to (Evaluate:12Feb2017)  Requested for: 45WZX4612; Last   Rx:13Jan2017 Ordered   4  TraZODone HCl - 100 MG Oral Tablet; TAKE 1 TABLET AT BEDTIME; Therapy: 93ELG3401 to (Evaluate:13Apr2017)  Requested for: 81EKV6403; Last   Rx:13Jan2017 Ordered    Allergies    1   No Known Drug Allergies    Signatures   Electronically signed by : NICKY Kowalski; Feb 20 2017 11:20AM EST                       (Author)

## 2018-01-15 NOTE — PROGRESS NOTES
Assessment    1  Weakness of left side of body (8 87) (M62 81)   2  Blurry vision (368 8) (H53 8)    Plan  Blurry vision    · [address] (OPHTHALMOLOGY ) Physician Referral  Consult   Status: Active  Requested for: 96GVD5653  Care Summary provided  : Yes  Psychosis    · Divalproex Sodium 500 MG Oral Tablet Delayed Release; TAKE 2 TABLETS AT  BEDTIME   · QUEtiapine Fumarate 200 MG Oral Tablet; Take 1 tablet twice daily    Discussion/Summary    Get lab work completed  Will call with results  Follow up with neurology for weakness and history of TBI  Follow up with ophthalmology for blurry vision  Call insurance about psychiatry  Any concerns return to the office  Follow up in 1 month  Possible side effects of new medications were reviewed with the patient/guardian today  The treatment plan was reviewed with the patient/guardian  The patient/guardian understands and agrees with the treatment plan   The patient was counseled regarding  Chief Complaint  go over meds with doctor      History of Present Illness  40 y/o M presenting for follow up  Patient brought his medication in today  Dates on bottles were from 2014, but he states that he was getting his medication  Patient also states that he lost his paperwork from last visit so he did not get blood work or make an appointment with neurology  He also has not called his insurance to find a psychiatrist  He states that he has a girlfriend who can help him out make appointments  Today patient still has complaint of left sided weakness which has been going on since his TBI  He also has had blurry vision since that episode  Pain Assessment   the patient states they do not have pain  Abuse And Domestic Violence Screen    Yes, the patient is safe at home  The patient states no one is hurting them  Depression And Suicide Screen  No, the patient has not had thoughts of hurting themself     No, the patient has not felt depressed in the past 7 days  Review of Systems    Eyes: no eye pain    The patient presents with complaints of constant episodes of moderate left eye eyesight problems, described as blurry vision  Cardiovascular: no chest pain, no intermittent leg claudication, no palpitations and no extremity edema  Respiratory: no shortness of breath, no cough and no wheezing  Gastrointestinal: No complaints of abdominal pain, no constipation, no nausea or vomiting, no diarrhea or bloody stools  Musculoskeletal: no arthralgias and no myalgias  Neurological: headache and limb weakness, but no numbness, no tingling, no dizziness and no fainting  Active Problems    1  Back muscle spasm (724 8) (M62 830)   2  TBI (traumatic brain injury) (854 00) (S06 9X9A)   3  Weakness of left side of body (728 87) (M62 81)    Surgical History    1  History of Elevation Of Depressed Skull Fracture    The surgical history was reviewed and updated today  Family History    1  Family history of diabetes mellitus (V18 0) (Z83 3)    The family history was reviewed and updated today  Social History    · Never smoker  The social history was reviewed and updated today  The social history was reviewed and is unchanged  Current Meds   1  Naproxen 500 MG Oral Tablet; take 1 tablet every 12 hours with food as needed; Therapy: 50GHK5618 to (Evaluate:72Pqu2377)  Requested for: 88XHN4319; Last   Rx:05Uxy8998 Ordered    The medication list was reviewed and updated today  Allergies    1  No Known Drug Allergies    Vitals  Vital Signs [Data Includes: Current Encounter]    Recorded: 37QBZ9912 01:01PM   Temperature 96 2 F   Heart Rate 90   Respiration 18   Systolic 620   Diastolic 70   Height 5 ft 6 in   Weight 243 lb    BMI Calculated 39 22   BSA Calculated 2 17   O2 Saturation 96     Physical Exam    Constitutional   General appearance: No acute distress, well appearing and well nourished      Eyes   Conjunctiva and lids: No swelling, erythema, or discharge  Pupils and irises: Equal, round and reactive to light  Ears, Nose, Mouth, and Throat   Oropharynx: Normal with no erythema, edema, exudate or lesions  Pulmonary   Respiratory effort: No increased work of breathing or signs of respiratory distress  Auscultation of lungs: Clear to auscultation, equal breath sounds bilaterally, no wheezes, no rales, no rhonci  Cardiovascular   Palpation of heart: Normal PMI, no thrills  Auscultation of heart: Normal rate and rhythm, normal S1 and S2, without murmurs  Carotid pulses: Normal     Lymphatic   Palpation of lymph nodes in neck: No lymphadenopathy  Musculoskeletal   Gait and station: Normal     Inspection/palpation of joints, bones, and muscles: Abnormal   (Strength 4/5 in left extremities and 5/5 in right extremities)   Neurologic   Cranial nerves: Cranial nerves 2-12 intact  Reflexes: 2+ and symmetric           Signatures   Electronically signed by : Suzie Díaz; Jan 20 2016  1:36PM EST                       (Author)    Electronically signed by : CORRIE Hou ; Jan 20 2016  1:46PM EST

## 2018-01-16 ENCOUNTER — APPOINTMENT (OUTPATIENT)
Dept: PHYSICAL THERAPY | Facility: MEDICAL CENTER | Age: 48
End: 2018-01-16
Payer: COMMERCIAL

## 2018-01-18 ENCOUNTER — APPOINTMENT (OUTPATIENT)
Dept: PHYSICAL THERAPY | Facility: MEDICAL CENTER | Age: 48
End: 2018-01-18
Payer: COMMERCIAL

## 2018-01-22 VITALS
SYSTOLIC BLOOD PRESSURE: 142 MMHG | DIASTOLIC BLOOD PRESSURE: 94 MMHG | BODY MASS INDEX: 39.45 KG/M2 | WEIGHT: 245.44 LBS | HEIGHT: 66 IN | RESPIRATION RATE: 20 BRPM | HEART RATE: 72 BPM | TEMPERATURE: 95.8 F | OXYGEN SATURATION: 98 %

## 2018-01-22 VITALS
SYSTOLIC BLOOD PRESSURE: 148 MMHG | WEIGHT: 251.99 LBS | DIASTOLIC BLOOD PRESSURE: 101 MMHG | BODY MASS INDEX: 40.5 KG/M2 | HEIGHT: 66 IN

## 2018-01-23 ENCOUNTER — APPOINTMENT (OUTPATIENT)
Dept: PHYSICAL THERAPY | Facility: MEDICAL CENTER | Age: 48
End: 2018-01-23
Payer: COMMERCIAL

## 2018-01-23 NOTE — RESULT NOTES
Verified Results  (1) CBC/PLT/DIFF [id number] 10:52AM Robert Goodness Order Number: [id number]     Test Name Result Flag Reference   WBC COUNT 5 85 Thousand/uL  4 31-10 16   RBC COUNT 5 57 Million/uL  3 88-5 62   HEMOGLOBIN 15 0 g/dL  12 0-17 0   HEMATOCRIT 46 2 %  36 5-49 3   MCV 83 fL  82-98   MCH 26 9 pg  26 8-34 3   MCHC 32 5 g/dL  31 4-37 4   RDW 13 8 %  11 6-15 1   MPV 10 4 fL  8 9-12 7   PLATELET COUNT 703 Thousands/uL  149-390   nRBC AUTOMATED 0 /100 WBCs     NEUTROPHILS RELATIVE PERCENT 44 %  43-75   LYMPHOCYTES RELATIVE PERCENT 39 %  14-44   MONOCYTES RELATIVE PERCENT 7 %  4-12   EOSINOPHILS RELATIVE PERCENT 9 % H 0-6   BASOPHILS RELATIVE PERCENT 1 %  0-1   NEUTROPHILS ABSOLUTE COUNT 2 59 Thousands/? ??L  1 85-7 62   LYMPHOCYTES ABSOLUTE COUNT 2 26 Thousands/? ??L  0 60-4 47   MONOCYTES ABSOLUTE COUNT 0 40 Thousand/? ??L  0 17-1 22   EOSINOPHILS ABSOLUTE COUNT 0 51 Thousand/? ??L  0 00-0 61   BASOPHILS ABSOLUTE COUNT 0 04 Thousands/? ??L  0 00-0 10     (1) COMPREHENSIVE METABOLIC PANEL [id number] 93:27FA Robert Goodness Order Number: [id number]     Test Name Result Flag Reference   SODIUM 137 mmol/L  136-145   POTASSIUM 4 2 mmol/L  3 5-5 3   Slightly Hemolyzed; Results May be Affected   CHLORIDE 104 mmol/L  100-108   CARBON DIOXIDE 27 mmol/L  21-32   ANION GAP (CALC) 6 mmol/L  4-13   BLOOD UREA NITROGEN 13 mg/dL  5-25   CREATININE 1 11 mg/dL  0 60-1 30   Standardized to IDMS reference method   CALCIUM 9 1 mg/dL  8 3-10 1   BILI, TOTAL 0 58 mg/dL  0 20-1 00   ALK PHOSPHATAS 96 U/L     ALT (SGPT) 60 U/L  12-78   Specimen collection should occur prior to Sulfasalazine and/or Sulfapyridine administration due to the potential for falsely depressed results  AST(SGOT) 33 U/L  5-45   Slightly Hemolyzed; Results May be Affected  Specimen collection should occur prior to Sulfasalazine administration due to the potential for falsely depressed results     ALBUMIN 4 0 g/dL  3 5-5 0   TOTAL PROTEIN 8 3 g/dL H 6 4-8 2   eGFR 79 ml/min/1 73sq m     National Kidney Disease Education Program recommendations are as follows:  GFR calculation is accurate only with a steady state creatinine  Chronic Kidney disease less than 60 ml/min/1 73 sq  meters  Kidney failure less than 15 ml/min/1 73 sq  meters  GLUCOSE FASTING 122 mg/dL H 65-99   Specimen collection should occur prior to Sulfasalazine administration due to the potential for falsely depressed results  Specimen collection should occur prior to Sulfapyridine administration due to the potential for falsely elevated results  (1) LIPID PANEL, FASTING [id number] 10:52AM [facility] Order Number: [id number]     Test Name Result Flag Reference   CHOLESTEROL 207 mg/dL H    HDL,DIRECT 43 mg/dL  40-60   Specimen collection should occur prior to Metamizole administration due to the potential for falsley depressed results  LDL CHOLESTEROL CALCULATED 138 mg/dL H 0-100   Triglyceride:        Normal <150 mg/dl   Borderline High 150-199 mg/dl   High 200-499 mg/dl   Very High >499 mg/dl      Cholesterol:       Desirable <200 mg/dl    Borderline High 200-239 mg/dl    High >239 mg/dl      HDL Cholesterol:       High>59 mg/dL    Low <41 mg/dL      This screening LDL is a calculated result  It does not have the accuracy of the Direct Measured LDL in the monitoring of patients with hyperlipidemia and/or statin therapy  Direct Measure LDL (NXX606) must be ordered separately in these patients  TRIGLYCERIDES 128 mg/dL  <=150   Specimen collection should occur prior to N-Acetylcysteine or Metamizole administration due to the potential for falsely depressed results       (1) MICROALBUMIN CREATININE RATIO, RANDOM URINE [id number] 10:52AM [facility] Order Number: [id number]     Test Name Result Flag Reference   MICROALBUMIN/ CREAT R 10 mg/g creatinine  0-30   MICROALBUMIN,URINE 18 2 mg/L  0 0-20 0   CREATININE URINE 176 0 mg/dL (1) URINALYSIS w URINE C/S REFLEX (will reflex a microscopy if leukocytes, occult blood, or nitrites are not within normal limits) [id number] 10:52AM Pardeep Teran Order Number: [id number]     Test Name Result Flag Reference   COLOR Yellow     CLARITY Clear     PH UA 6 0  4 5-8 0   LEUKOCYTE ESTERASE UA Small A Negative   NITRITE UA Negative  Negative   PROTEIN UA Negative mg/dl  Negative   GLUCOSE UA Negative mg/dl  Negative   KETONES UA Negative mg/dl  Negative   UROBILINOGEN UA 0 2 E U /dl  0 2, 1 0 E U /dl   BILIRUBIN UA Negative  Negative   BLOOD UA Negative  Negative   SPECIFIC GRAVITY UA 1 020  1 003-1 030   BACTERIA Occasional /hpf  None Seen, Occasional   EPITHELIAL CELLS None Seen /hpf  None Seen, Occasional   HYALINE CASTS None Seen /lpf  None Seen   RBC UA None Seen /hpf  None Seen, 0-5   WBC UA 10-20 /hpf A None Seen, 0-5, 5-55, 5-65

## 2018-01-25 ENCOUNTER — APPOINTMENT (OUTPATIENT)
Dept: PHYSICAL THERAPY | Facility: MEDICAL CENTER | Age: 48
End: 2018-01-25
Payer: COMMERCIAL

## 2018-01-25 PROBLEM — E11.9 DIABETES MELLITUS TYPE II, CONTROLLED (HCC): Status: ACTIVE | Noted: 2017-10-17

## 2018-01-25 PROBLEM — S06.0X0A CONCUSSION WITH NO LOSS OF CONSCIOUSNESS: Status: ACTIVE | Noted: 2017-10-13

## 2018-01-25 PROBLEM — G89.29 CHRONIC BACK PAIN: Status: ACTIVE | Noted: 2018-01-04

## 2018-01-25 PROBLEM — M54.9 CHRONIC BACK PAIN: Status: ACTIVE | Noted: 2018-01-04

## 2018-01-25 PROBLEM — E55.9 VITAMIN D DEFICIENCY: Status: ACTIVE | Noted: 2017-10-17

## 2018-01-25 PROBLEM — H53.9 VISUAL DISTURBANCES: Status: ACTIVE | Noted: 2017-10-13

## 2018-01-27 ENCOUNTER — HOSPITAL ENCOUNTER (EMERGENCY)
Facility: HOSPITAL | Age: 48
Discharge: HOME | End: 2018-01-27
Attending: EMERGENCY MEDICINE | Admitting: EMERGENCY MEDICINE
Payer: COMMERCIAL

## 2018-01-27 VITALS
DIASTOLIC BLOOD PRESSURE: 76 MMHG | HEART RATE: 71 BPM | RESPIRATION RATE: 18 BRPM | BODY MASS INDEX: 39.54 KG/M2 | TEMPERATURE: 97.4 F | OXYGEN SATURATION: 98 % | WEIGHT: 245 LBS | SYSTOLIC BLOOD PRESSURE: 147 MMHG

## 2018-01-27 DIAGNOSIS — R30.0 DYSURIA: ICD-10-CM

## 2018-01-27 DIAGNOSIS — R33.9 URINARY RETENTION: ICD-10-CM

## 2018-01-27 DIAGNOSIS — N39.0 URINARY TRACT INFECTION: Primary | ICD-10-CM

## 2018-01-27 LAB
ANION GAP BLD CALC-SCNC: 16 MMOL/L (ref 4–13)
BACTERIA UR QL AUTO: ABNORMAL /HPF
BILIRUB UR QL STRIP: NEGATIVE
BUN BLD-MCNC: 15 MG/DL (ref 5–25)
CA-I BLD-SCNC: 1.18 MMOL/L (ref 1.12–1.32)
CHLORIDE BLD-SCNC: 102 MMOL/L (ref 100–108)
CLARITY UR: CLEAR
COLOR UR: YELLOW
CREAT BLD-MCNC: 1.2 MG/DL (ref 0.6–1.3)
GFR SERPL CREATININE-BSD FRML MDRD: 72 ML/MIN/1.73SQ M
GLUCOSE SERPL-MCNC: 144 MG/DL (ref 65–140)
GLUCOSE UR STRIP-MCNC: NEGATIVE MG/DL
HCT VFR BLD CALC: 44 % (ref 36.5–49.3)
HGB BLDA-MCNC: 15 G/DL (ref 12–17)
HGB UR QL STRIP.AUTO: ABNORMAL
KETONES UR STRIP-MCNC: NEGATIVE MG/DL
LEUKOCYTE ESTERASE UR QL STRIP: ABNORMAL
MUCOUS THREADS UR QL AUTO: ABNORMAL
NITRITE UR QL STRIP: NEGATIVE
NON-SQ EPI CELLS URNS QL MICRO: ABNORMAL /HPF
PCO2 BLD: 27 MMOL/L (ref 21–32)
PH UR STRIP.AUTO: 5.5 [PH] (ref 4.5–8)
POTASSIUM BLD-SCNC: 4.1 MMOL/L (ref 3.5–5.3)
PROT UR STRIP-MCNC: ABNORMAL MG/DL
RBC #/AREA URNS AUTO: ABNORMAL /HPF
SODIUM BLD-SCNC: 140 MMOL/L (ref 136–145)
SP GR UR STRIP.AUTO: 1.02 (ref 1–1.03)
SPECIMEN SOURCE: ABNORMAL
UROBILINOGEN UR QL STRIP.AUTO: 0.2 E.U./DL
WBC #/AREA URNS AUTO: ABNORMAL /HPF

## 2018-01-27 PROCEDURE — 85014 HEMATOCRIT: CPT

## 2018-01-27 PROCEDURE — 81002 URINALYSIS NONAUTO W/O SCOPE: CPT | Performed by: EMERGENCY MEDICINE

## 2018-01-27 PROCEDURE — 87086 URINE CULTURE/COLONY COUNT: CPT

## 2018-01-27 PROCEDURE — 80047 BASIC METABLC PNL IONIZED CA: CPT

## 2018-01-27 PROCEDURE — 81001 URINALYSIS AUTO W/SCOPE: CPT

## 2018-01-27 PROCEDURE — 99283 EMERGENCY DEPT VISIT LOW MDM: CPT

## 2018-01-27 RX ORDER — CIPROFLOXACIN 500 MG/1
500 TABLET, FILM COATED ORAL 2 TIMES DAILY
Qty: 20 TABLET | Refills: 0 | Status: SHIPPED | OUTPATIENT
Start: 2018-01-27 | End: 2018-02-06

## 2018-01-27 RX ORDER — TAMSULOSIN HYDROCHLORIDE 0.4 MG/1
0.4 CAPSULE ORAL
Qty: 20 CAPSULE | Refills: 0 | Status: SHIPPED | OUTPATIENT
Start: 2018-01-27 | End: 2018-01-27

## 2018-01-27 RX ORDER — CIPROFLOXACIN 500 MG/1
500 TABLET, FILM COATED ORAL EVERY 12 HOURS SCHEDULED
Qty: 20 TABLET | Refills: 0 | Status: SHIPPED | OUTPATIENT
Start: 2018-01-27 | End: 2018-02-06

## 2018-01-27 RX ORDER — CIPROFLOXACIN 500 MG/1
500 TABLET, FILM COATED ORAL ONCE
Status: COMPLETED | OUTPATIENT
Start: 2018-01-27 | End: 2018-01-27

## 2018-01-27 RX ORDER — TAMSULOSIN HYDROCHLORIDE 0.4 MG/1
0.4 CAPSULE ORAL
Qty: 20 CAPSULE | Refills: 0 | Status: SHIPPED | OUTPATIENT
Start: 2018-01-27 | End: 2018-03-27 | Stop reason: SDUPTHER

## 2018-01-27 RX ADMIN — CIPROFLOXACIN 500 MG: 500 TABLET, FILM COATED ORAL at 09:47

## 2018-01-27 NOTE — DISCHARGE INSTRUCTIONS
Take the ciprofloxacin twice daily for the next 10 days, this will treat the urinary tract infection  Take the Tamsulosin daily, this is to help with urination  The number for the urologist is included in these discharge instructions, call to be seen in the office, tell them you have a catheter in place  You need to be seen in the office and they will remove the catheter  Urinary Tract Infection in Men   WHAT YOU NEED TO KNOW:   A urinary tract infection (UTI) is caused by bacteria that get inside your urinary tract  Most bacteria that enter your urinary tract come out when you urinate  If the bacteria stay in your urinary tract, you may get an infection  Your urinary tract includes your kidneys, ureters, bladder, and urethra  Urine is made in your kidneys, and it flows from the ureters to the bladder  Urine leaves the bladder through the urethra  A UTI is more common in your lower urinary tract, which includes your bladder and urethra  DISCHARGE INSTRUCTIONS:   Return to the emergency department if:   · You are urinating very little or not at all  · You have a high fever with shaking chills  · You have side or back pain that gets worse  Contact your healthcare provider if:   · You have a mild fever  · You do not feel better after 2 days of taking antibiotics  · You are vomiting  · You have new symptoms, such as blood or pus in your urine  · You have questions or concerns about your condition or care  Medicines:   · Antibiotics  help fight a bacterial infection  · Medicines  may be given to decrease pain and burning when you urinate  They will also help decrease the feeling that you need to urinate often  These medicines will make your urine orange or red  · Take your medicine as directed  Contact your healthcare provider if you think your medicine is not helping or if you have side effects  Tell him of her if you are allergic to any medicine   Keep a list of the medicines, vitamins, and herbs you take  Include the amounts, and when and why you take them  Bring the list or the pill bottles to follow-up visits  Carry your medicine list with you in case of an emergency  Prevent another UTI:   · Empty your bladder often  Urinate and empty your bladder as soon as you feel the need  Do not hold your urine for long periods of time  · Drink liquids as directed  Ask how much liquid to drink each day and which liquids are best for you  You may need to drink more liquids than usual to help flush out the bacteria  Do not drink alcohol, caffeine, or citrus juices  These can irritate your bladder and increase your symptoms  Your healthcare provider may recommend cranberry juice to help prevent a UTI  · Urinate after you have sex  This can help flush out bacteria passed during sex  · Do pelvic muscle exercises often  Pelvic muscle exercises may help you start and stop urinating  Strong pelvic muscles may help you empty your bladder easier  Squeeze these muscles tightly for 5 seconds like you are trying to hold back urine  Then relax for 5 seconds  Gradually work up to squeezing for 10 seconds  Do 3 sets of 15 repetitions a day, or as directed  Follow up with your healthcare provider as directed:  Write down your questions so you remember to ask them during your visits  © 2017 [facility] Information is for End User's use only and may not be sold, redistributed or otherwise used for commercial purposes  All illustrations and images included in CareNotes® are the copyrighted property of [employer]  or Adarsh Manzo  The above information is an  only  It is not intended as medical advice for individual conditions or treatments  Talk to your doctor, nurse or pharmacist before following any medical regimen to see if it is safe and effective for you

## 2018-01-27 NOTE — ED NOTES
Patient reports was able to pass some urine this morning  States it drips out and is red in color        Nj Dahl RN  01/27/18 2302       Nj Dahl RN  01/27/18 4667

## 2018-01-27 NOTE — ED NOTES
Patient urinated 125ml blood-tinged urine   States feels better  Dr Morrow Falling informed       Bart Yuen, RN  01/27/18 8502

## 2018-01-27 NOTE — ED PROVIDER NOTES
History  Chief Complaint   Patient presents with    Blood in Urine     Blood in urine for the past two weeks; also reports no urination since 2200 last night  53 YO male presents with dysuria and hematuria for the last 2 weeks  Pt states he has had a feeling of the need to urinate, hesitancy and feeling of incomplete voiding with some mild burning on urination that has been constant  Pt states he has noticed red urine, moderate amount  This has continued for the last 2 weeks  Pt denies similar symptoms in the past  States he has not called his PCP for this, he did come to the ED for evaluation previously but left as he was scared of what might be found  Pt states he has been able to produce only very small amounts of urine since last night  Pt denies CP/SOB/F/C/N/V/D/C  History provided by:  Patient   used: No    Blood in Urine   This is a new problem  The current episode started 1 to 4 weeks ago  The problem is unchanged  He describes the hematuria as gross hematuria  The hematuria occurs throughout his entire urinary stream  He reports clotting at the beginning of his urine stream  The pain is mild  He describes his urine color as dark red  Irritative symptoms include frequency and urgency  Obstructive symptoms include dribbling, incomplete emptying and a weak stream  Pertinent negatives include no abdominal pain, dysuria, fever, flank pain, nausea or vomiting  Prior to Admission Medications   Prescriptions Last Dose Informant Patient Reported? Taking?    Lancets (FREESTYLE) lancets   Yes No   Sig: USE LANCET ONCE DAILY   QUEtiapine (SEROquel) 25 mg tablet   Yes No   Sig: Take 25 mg by mouth daily at bedtime   atorvastatin (LIPITOR) 10 mg tablet   Yes No   Sig: Take 1 tablet by mouth daily   divalproex sodium (DEPAKOTE) 125 mg EC tablet   Yes No   Sig: Take 250 mg by mouth every 8 (eight) hours   ergocalciferol (VITAMIN D2) 50,000 units   Yes No   Sig: Take 1 capsule by mouth once a week   glucose blood (FREESTYLE LITE) test strip   Yes No   Sig: by In Vitro route daily   lisinopril (ZESTRIL) 5 mg tablet   Yes No   Sig: Take 1 tablet by mouth daily   magnesium oxide (MAG-OX) 400 mg tablet   Yes No   Sig: Take 1 tablet by mouth daily   metFORMIN (GLUCOPHAGE) 500 mg tablet   Yes No   Sig: Take 1 tablet by mouth daily   methocarbamol (ROBAXIN) 500 mg tablet   Yes No   Sig: Take 1 tablet by mouth 3 (three) times a day   naproxen (NAPROSYN) 500 mg tablet   Yes No   Sig: TAKE 1 TABLET EVERY 12 HOURS WITH FOOD AS NEEDED  Facility-Administered Medications: None       Past Medical History:   Diagnosis Date    Diabetes mellitus ([facility] [address] )     Hyperlipidemia     Hypertension     Psychosis        Past Surgical History:   Procedure Laterality Date    ELEVATION OF DEPRESSED SKULL FRACTURE         Family History   Problem Relation Age of Onset    Diabetes Father      I have reviewed and agree with the history as documented  Social History   Substance Use Topics    Smoking status: Never Smoker    Smokeless tobacco: Never Used    Alcohol use No        Review of Systems   Constitutional: Negative for fever  HENT: Negative for dental problem  Eyes: Negative for visual disturbance  Respiratory: Negative for shortness of breath  Cardiovascular: Negative for chest pain  Gastrointestinal: Negative for abdominal pain, nausea and vomiting  Genitourinary: Positive for frequency, hematuria, incomplete emptying and urgency  Negative for dysuria and flank pain  Musculoskeletal: Negative for neck pain and neck stiffness  Skin: Negative for rash  Neurological: Negative for dizziness, weakness and light-headedness  Psychiatric/Behavioral: Negative for agitation, behavioral problems and confusion  All other systems reviewed and are negative        Physical Exam  ED Triage Vitals [01/27/18 0926]   Temperature Pulse Respirations Blood Pressure SpO2   (!) 97 4 °F (36 3 °C) 71 18 147/76 98 %      Temp Source Heart Rate Source Patient Position - Orthostatic VS BP Location FiO2 (%)   Oral -- -- -- --      Pain Score       8           Orthostatic Vital Signs  Vitals:    01/27/18 0926   BP: 147/76   Pulse: 71       Physical Exam   Constitutional: He is oriented to person, place, and time  He appears well-developed and well-nourished  HENT:   Head: Normocephalic and atraumatic  Eyes: EOM are normal    Neck: Normal range of motion  Cardiovascular: Normal rate, regular rhythm and normal heart sounds  Pulmonary/Chest: Effort normal and breath sounds normal    Abdominal: Soft  There is no tenderness  Musculoskeletal: Normal range of motion  Neurological: He is alert and oriented to person, place, and time  Skin: Skin is warm and dry  Psychiatric: He has a normal mood and affect  His behavior is normal    Nursing note and vitals reviewed  ED Medications  Medications   ciprofloxacin (CIPRO) tablet 500 mg (500 mg Oral Given 1/27/18 0947)       Diagnostic Studies  Results Reviewed     Procedure Component Value Units Date/Time    Urine Microscopic [[id number]]  (Abnormal) Collected:  01/27/18 0936    Lab Status:  Final result Specimen:  Urine from Urine, Clean Catch Updated:  01/27/18 1055     RBC, UA 10-20 (A) /hpf      WBC, UA 10-20 (A) /hpf      Epithelial Cells Occasional /hpf      Bacteria, UA Moderate (A) /hpf      MUCOUS THREADS Moderate    Urine culture [[id number]] Collected:  01/27/18 0936    Lab Status:   In process Specimen:  Urine from Urine, Clean Catch Updated:  01/27/18 1055    POCT Chem 8+ [[id number]]  (Abnormal) Collected:  01/27/18 0955    Lab Status:  Final result Updated:  01/27/18 0959     SODIUM, I-STAT 140 mmol/l      Potassium, i-STAT 4 1 mmol/L      Chloride, istat 102 mmol/L      CO2, i-STAT 27 mmol/L      Anion Gap, Istat 16 (H) mmol/L      Calcium, Ionized i-STAT 1 18 mmol/L      BUN, I-STAT 15 mg/dl      Creatinine, i-STAT 1 2 mg/dl      eGFR 72 ml/min/1 73sq m      Glucose, i-STAT 144 (H) mg/dl      Hct, i-STAT 44 %      Hgb, i-STAT 15 0 g/dl      Specimen Type VENOUS    POCT urinalysis dipstick [[id number]]  (Abnormal) Resulted:  01/27/18 0939    Lab Status:  Final result Updated:  01/27/18 0939    ED Urine Macroscopic [[id number]]  (Abnormal) Collected:  01/27/18 0936    Lab Status:  Final result Specimen:  Urine Updated:  01/27/18 0937     Color, UA Yellow     Clarity, UA Clear     pH, UA 5 5     Leukocytes, UA Moderate (A)     Nitrite, UA Negative     Protein,  (2+) (A) mg/dl      Glucose, UA Negative mg/dl      Ketones, UA Negative mg/dl      Urobilinogen, UA 0 2 E U /dl      Bilirubin, UA Negative     Blood, UA Moderate (A)     Specific [place], UA 1 020    Narrative:       CLINITEK RESULT                 No orders to display              Procedures  Procedures       Phone Contacts  ED Phone Contact    ED Course  ED Course as of Jan 27 1659   Sat Jan 27, 2018   2672 Informed by staff that bladder scan was performed, 460cc's of urine in bladder  1036 Attempted to place 12 Tamazight burdick catheter, urethral meatus very small and difficult to get catheter into, however was successful advancing into the urethra, unable to pass the catheter past the prostate as it coiled up  Switched to a 12 [place] coudé and still with difficulty passing past the prostate  Pt is able to urinate 125mL after this, however and states he is feeling somewhat better  Will be discharged on Ciprofloxacin and tamsulosin, instructed to return should he develop worsening retention  MDM  Number of Diagnoses or Management Options  Dysuria: new and requires workup  Urinary retention: new and requires workup  Urinary tract infection: new and requires workup  Diagnosis management comments: 1  Hematuria - Pt with only small urine production, frequency  Will obtain urine for infection, blood, chem-8 to assess kidney function         Amount and/or Complexity of Data Reviewed  Clinical lab tests: ordered and reviewed    Patient Progress  Patient progress: improved    CritCare Time    Disposition  Final diagnoses:   Urinary tract infection   Dysuria   Urinary retention     Time reflects when diagnosis was documented in both MDM as applicable and the Disposition within this note     Time User Action Codes Description Comment    1/27/2018 10:03 AM Adrianna PIÑA Add [N39 0] Urinary tract infection     1/27/2018 10:03 AM Adrianna Braden E Add [R30 0] Dysuria     1/27/2018 10:12 AM Adrianna Braden E Add [R33 9] Urinary retention       ED Disposition     ED Disposition Condition Comment    Discharge  Anitra Handy discharge to home/self care  Condition at discharge: Stable        Follow-up Information     Follow up With Specialties Details Why Lillie Hirsch MD Urology Schedule an appointment as soon as possible for a visit  [address] [place] [address]  [phone]          Discharge Medication List as of 1/27/2018 10:16 AM      START taking these medications    Details   !! ciprofloxacin (CIPRO) 500 mg tablet Take 1 tablet (500 mg total) by mouth every 12 (twelve) hours for 10 days, Starting Sat 1/27/2018, Until Tue 2/6/2018, Normal      !! ciprofloxacin (CIPRO) 500 mg tablet Take 1 tablet (500 mg total) by mouth 2 (two) times a day for 10 days, Starting Sat 1/27/2018, Until Tue 2/6/2018, Print      tamsulosin (FLOMAX) 0 4 mg Take 1 capsule (0 4 mg total) by mouth daily with dinner, Starting Sat 1/27/2018, Print       !! - Potential duplicate medications found  Please discuss with provider        CONTINUE these medications which have NOT CHANGED    Details   atorvastatin (LIPITOR) 10 mg tablet Take 1 tablet by mouth daily, Starting Tue 10/17/2017, Historical Med      divalproex sodium (DEPAKOTE) 125 mg EC tablet Take 250 mg by mouth every 8 (eight) hours, Historical Med      ergocalciferol (VITAMIN D2) 50,000 units Take 1 capsule by mouth once a week, Starting Tue 10/17/2017, Historical Med      glucose blood (FREESTYLE LITE) test strip by In Vitro route daily, Starting Thu 10/19/2017, Historical Med      Lancets (FREESTYLE) lancets USE LANCET ONCE DAILY, Historical Med      lisinopril (ZESTRIL) 5 mg tablet Take 1 tablet by mouth daily, Starting Tue 10/17/2017, Historical Med      magnesium oxide (MAG-OX) 400 mg tablet Take 1 tablet by mouth daily, Starting Fri 10/13/2017, Historical Med      metFORMIN (GLUCOPHAGE) 500 mg tablet Take 1 tablet by mouth daily, Starting Tue 10/17/2017, Historical Med      methocarbamol (ROBAXIN) 500 mg tablet Take 1 tablet by mouth 3 (three) times a day, Starting Thu 1/4/2018, Historical Med      naproxen (NAPROSYN) 500 mg tablet TAKE 1 TABLET EVERY 12 HOURS WITH FOOD AS NEEDED , Historical Med      QUEtiapine (SEROquel) 25 mg tablet Take 25 mg by mouth daily at bedtime, Historical Med           No discharge procedures on file      ED Provider  Electronically Signed by           Aroldo Aguilera MD  01/27/18 9969

## 2018-01-27 NOTE — ED NOTES
Crowe catheter insertion attempted by Dr Manav Aleman with 12 Fr catheter due to patient's small urethral opening  Was unable to get past the prostate  Attempted 16 Fr Coude- still unsuccessful passing the prostate         Francisco Yuen RN  01/27/18 7051

## 2018-01-27 NOTE — ED NOTES
Patient voided 15ml cloudy yellow urine  Has had urinary urgency and frequency for about 2 weeks  States did not call his PCP, but came to the ED a couple times  He did not wait to be seen       Jeri Arroyo RN  01/27/18 Chey Amador RN  01/27/18 2696

## 2018-01-27 NOTE — ED NOTES
Dr Judy Marin at bedside and reviewed discharge instructions with patient  Stressed to return if he again can not urinate  Patient verbalized understanding       Romana Tomlinson RN  01/27/18 9173

## 2018-01-29 LAB — BACTERIA UR CULT: NORMAL

## 2018-01-31 RX ORDER — QUETIAPINE FUMARATE 200 MG/1
200 TABLET, FILM COATED ORAL
Refills: 1 | COMMUNITY
Start: 2018-01-29 | End: 2018-12-10 | Stop reason: SDDI

## 2018-01-31 RX ORDER — DIVALPROEX SODIUM 500 MG/1
1000 TABLET, EXTENDED RELEASE ORAL
Refills: 1 | COMMUNITY
Start: 2018-01-29 | End: 2018-12-10 | Stop reason: SDDI

## 2018-02-02 ENCOUNTER — OFFICE VISIT (OUTPATIENT)
Dept: FAMILY MEDICINE CLINIC | Facility: CLINIC | Age: 48
End: 2018-02-02
Payer: COMMERCIAL

## 2018-02-02 VITALS
TEMPERATURE: 96 F | DIASTOLIC BLOOD PRESSURE: 72 MMHG | BODY MASS INDEX: 39.32 KG/M2 | OXYGEN SATURATION: 96 % | SYSTOLIC BLOOD PRESSURE: 126 MMHG | WEIGHT: 244.7 LBS | HEIGHT: 66 IN | HEART RATE: 84 BPM | RESPIRATION RATE: 18 BRPM

## 2018-02-02 DIAGNOSIS — N40.1 BENIGN PROSTATIC HYPERPLASIA WITH URINARY FREQUENCY: ICD-10-CM

## 2018-02-02 DIAGNOSIS — R53.1 WEAKNESS OF LEFT SIDE OF BODY: Primary | ICD-10-CM

## 2018-02-02 DIAGNOSIS — E11.9 CONTROLLED TYPE 2 DIABETES MELLITUS WITHOUT COMPLICATION, WITHOUT LONG-TERM CURRENT USE OF INSULIN (HCC): ICD-10-CM

## 2018-02-02 DIAGNOSIS — Z12.5 ENCOUNTER FOR PROSTATE CANCER SCREENING: ICD-10-CM

## 2018-02-02 DIAGNOSIS — R35.0 BENIGN PROSTATIC HYPERPLASIA WITH URINARY FREQUENCY: ICD-10-CM

## 2018-02-02 PROCEDURE — 99213 OFFICE O/P EST LOW 20 MIN: CPT | Performed by: PHYSICIAN ASSISTANT

## 2018-02-02 RX ORDER — LANCETS 28 GAUGE
EACH MISCELLANEOUS
Qty: 100 EACH | Refills: 1 | Status: SHIPPED | OUTPATIENT
Start: 2018-02-02 | End: 2018-07-27 | Stop reason: SDUPTHER

## 2018-02-02 RX ORDER — BLOOD-GLUCOSE METER
KIT MISCELLANEOUS
Qty: 1 EACH | Refills: 0 | Status: SHIPPED | OUTPATIENT
Start: 2018-02-02 | End: 2018-07-27 | Stop reason: SDUPTHER

## 2018-02-02 NOTE — PROGRESS NOTES
Assessment/Plan:    Diabetes mellitus type II, controlled ([address] )  Continue check blood sugar daily  Discussed importance of diet and exercise help control diabetes  Continue with metformin 500 mg twice a day  Follow up with Dr Rita Lopez  Weakness of left side of body  Continue to follow up with PT  Follow up with Neurology  Benign prostatic hyperplasia with urinary frequency  Continue take Flomax daily  Get PSA at earliest convenience  Diagnoses and all orders for this visit:    Weakness of left side of body  -     Ambulatory referral to Physical Therapy; Future    Encounter for prostate cancer screening  -     PSA Total, Diagnostic; Future    Benign prostatic hyperplasia with urinary frequency    Controlled type 2 diabetes mellitus without complication, without long-term current use of insulin ([facility])  -     Blood Glucose Monitoring Suppl (FREESTYLE LITE) CHARAN; Used device to check blood sugar once daily   -     glucose blood (FREESTYLE LITE) test strip; Use trip to test blood sugar daily   -     Lancets (FREESTYLE) lancets; Use lancets to check blood sugar daily  Other orders  -     divalproex sodium (DEPAKOTE ER) 500 mg 24 hr tablet; Take 1,000 mg by mouth daily at bedtime  -     QUEtiapine (SEROquel) 200 mg tablet; Take 200 mg by mouth daily at bedtime          Subjective:      Patient ID: Veronica Gifford is a 52 y o  male  69-year-old male presenting for follow-up of recent ED visit for UTI  Patient has been diagnosed with BPH  Currently on Flomax  States when he went to the emergency room he was having dysuria, increased urinary frequency, incomplete voiding  Was placed on ciprofloxacin for 10 days  Patient states that his symptoms have been improving since he has been on the antibiotic  Has no concerns today  Has never had PSA done  For left-sided weakness after TBI, patient needs a new referral for physical therapy    States he would like to go to a new location and they are requesting a new script  Patient states that his glucometer broke and needs a new 1 sent to the pharmacy  No other concerns or questions today  Urinary Tract Infection    Pertinent negatives include no chills, flank pain, frequency, hematuria, nausea, urgency or vomiting  The following portions of the patient's history were reviewed and updated as appropriate:   He  has a past medical history of Diabetes mellitus ([address] ); Hyperlipidemia; Hypertension; and Psychosis  He  does not have any pertinent problems on file  His family history includes Diabetes in his father  He  reports that he has never smoked  He has never used smokeless tobacco  He reports that he does not drink alcohol or use drugs  Current Outpatient Prescriptions   Medication Sig Dispense Refill    atorvastatin (LIPITOR) 10 mg tablet Take 1 tablet by mouth daily      Blood Glucose Monitoring Suppl (FREESTYLE LITE) CHARAN Used device to check blood sugar once daily  1 each 0    ciprofloxacin (CIPRO) 500 mg tablet Take 1 tablet (500 mg total) by mouth every 12 (twelve) hours for 10 days 20 tablet 0    ciprofloxacin (CIPRO) 500 mg tablet Take 1 tablet (500 mg total) by mouth 2 (two) times a day for 10 days 20 tablet 0    divalproex sodium (DEPAKOTE ER) 500 mg 24 hr tablet Take 1,000 mg by mouth daily at bedtime  1    ergocalciferol (VITAMIN D2) 50,000 units Take 1 capsule by mouth once a week      glucose blood (FREESTYLE LITE) test strip Use trip to test blood sugar daily  100 each 1    Lancets (FREESTYLE) lancets Use lancets to check blood sugar daily   100 each 1    lisinopril (ZESTRIL) 5 mg tablet Take 1 tablet by mouth daily      magnesium oxide (MAG-OX) 400 mg tablet Take 1 tablet by mouth daily      metFORMIN (GLUCOPHAGE) 500 mg tablet Take 1 tablet by mouth daily      methocarbamol (ROBAXIN) 500 mg tablet Take 1 tablet by mouth 3 (three) times a day      naproxen (NAPROSYN) 500 mg tablet TAKE 1 TABLET EVERY 12 HOURS WITH FOOD AS NEEDED  2    QUEtiapine (SEROquel) 200 mg tablet Take 200 mg by mouth daily at bedtime  1    tamsulosin (FLOMAX) 0 4 mg Take 1 capsule (0 4 mg total) by mouth daily with dinner 20 capsule 0     No current facility-administered medications for this visit  He has No Known Allergies       Review of Systems   Constitutional: Negative for chills, fatigue and fever  Respiratory: Negative for shortness of breath  Cardiovascular: Negative for chest pain and palpitations  Gastrointestinal: Negative for abdominal pain, blood in stool, constipation, diarrhea, nausea and vomiting  Endocrine: Negative for polydipsia, polyphagia and polyuria  Genitourinary: Negative for decreased urine volume, dysuria, flank pain, frequency, hematuria and urgency  Neurological: Positive for weakness  Objective:  Vitals:    02/02/18 0821   BP: 126/72   Pulse: 84   Resp: 18   Temp: (!) 96 °F (35 6 °C)   SpO2: 96%   Weight: 111 kg (244 lb 11 2 oz)   Height: 5' 6" (1 676 m)      Physical Exam   Constitutional: He is oriented to person, place, and time  He appears well-developed and well-nourished  No distress  Cardiovascular: Normal rate, regular rhythm and normal heart sounds  Exam reveals no gallop and no friction rub  No murmur heard  Pulmonary/Chest: Effort normal and breath sounds normal  No respiratory distress  He has no wheezes  Abdominal: Soft  Bowel sounds are normal  He exhibits no distension  There is no tenderness  There is no rebound and no guarding  Musculoskeletal: Normal range of motion  Strength 5/5 in right upper extremity, and 4/5 in left upper extremity  Neurological: He is alert and oriented to person, place, and time  Skin: He is not diaphoretic

## 2018-02-02 NOTE — PROGRESS NOTES
I have reviewed the notes, assessments, and/or procedures performed by Mynor Ross PA-C I concur with his documentation of Alvarez Edwards

## 2018-02-02 NOTE — ASSESSMENT & PLAN NOTE
Continue check blood sugar daily  Discussed importance of diet and exercise help control diabetes  Continue with metformin 500 mg twice a day  Follow up with Dr Sylvester Chaney

## 2018-02-28 NOTE — RESULT NOTES
Verified Results  (1) URINE MICROSCOPIC [id number] 10:52AM [facility] Order Number: [id number]     Test Name Result Flag Reference   CLINICAL REPORT (Report)     Test:        Urine culture  Specimen Type:   Urine  Specimen Date:   1/4/2018 10:52 AM  Result Date:    1/6/2018 7:21 AM  Result Status:   Final result  Resulting Lab:   [place] [address]            Tel: [phone]      CULTURE                                       ------------------                                   <10,000 cfu/ml      *** Mixed Contaminants X3 ***

## 2018-03-22 RX ORDER — MAGNESIUM OXIDE 400 MG/1
1 TABLET ORAL DAILY
Refills: 0 | OUTPATIENT
Start: 2018-03-22

## 2018-03-22 NOTE — TELEPHONE ENCOUNTER
Pharmacy called questioning about medication  Dagoberto Yañez     He states atorvastatin 10mg and lisinopril 5mg, that cedric is also prescribing same medication different dosage      He wants to know who is the primary doctor for these medications      Call back number [phone]  Spoke to julianne

## 2018-03-22 NOTE — TELEPHONE ENCOUNTER
You will find out progress note from Dr Sharon Ralph office, pt was seen on 02/01/18 and Dr Elsa Prather increase his meds: Atorvastatin 20 mg qhs and Lisinopril 10 mg qhs   FYI  What do you want me to tell to the pharmacist?

## 2018-03-27 ENCOUNTER — OFFICE VISIT (OUTPATIENT)
Dept: FAMILY MEDICINE CLINIC | Facility: CLINIC | Age: 48
End: 2018-03-27
Payer: COMMERCIAL

## 2018-03-27 VITALS
TEMPERATURE: 97.6 F | HEART RATE: 86 BPM | SYSTOLIC BLOOD PRESSURE: 122 MMHG | DIASTOLIC BLOOD PRESSURE: 70 MMHG | HEIGHT: 66 IN | WEIGHT: 244.7 LBS | OXYGEN SATURATION: 98 % | RESPIRATION RATE: 20 BRPM | BODY MASS INDEX: 39.32 KG/M2

## 2018-03-27 DIAGNOSIS — N40.1 BENIGN PROSTATIC HYPERPLASIA WITH URINARY FREQUENCY: ICD-10-CM

## 2018-03-27 DIAGNOSIS — E11.9 CONTROLLED TYPE 2 DIABETES MELLITUS WITHOUT COMPLICATION, WITHOUT LONG-TERM CURRENT USE OF INSULIN (HCC): Primary | ICD-10-CM

## 2018-03-27 DIAGNOSIS — R33.9 URINARY RETENTION: ICD-10-CM

## 2018-03-27 DIAGNOSIS — R35.0 BENIGN PROSTATIC HYPERPLASIA WITH URINARY FREQUENCY: ICD-10-CM

## 2018-03-27 PROCEDURE — 99214 OFFICE O/P EST MOD 30 MIN: CPT | Performed by: PHYSICIAN ASSISTANT

## 2018-03-27 RX ORDER — TAMSULOSIN HYDROCHLORIDE 0.4 MG/1
0.4 CAPSULE ORAL
Qty: 90 CAPSULE | Refills: 1 | Status: SHIPPED | OUTPATIENT
Start: 2018-03-27 | End: 2018-12-06 | Stop reason: SDUPTHER

## 2018-03-27 RX ORDER — TAMSULOSIN HYDROCHLORIDE 0.4 MG/1
0.4 CAPSULE ORAL
Qty: 90 CAPSULE | Refills: 1 | Status: SHIPPED | OUTPATIENT
Start: 2018-03-27 | End: 2018-03-27 | Stop reason: SDUPTHER

## 2018-03-27 NOTE — PROGRESS NOTES
Assessment/Plan:    Diabetes mellitus type II, controlled ([address] )  With concerns of blurry vision discussed with patient that he needs to make appointment with ophthalmologist every year, to check for diabetic retinopathy  Gave referral to [facility]  At this time it is too early to recheck A1c, gave patient lab slip to get completed after 04/04/2018  If his blood sugars are in the low 100s, at this time do not expect make any changes to his diabetes medications  Once again discussed the importance of diet and exercise with patient  Make sure to watch carbohydrates in diet  Benign prostatic hyperplasia with urinary frequency  Informed patient that he does have lab work to have his PSA checked  Get this completed with the diabetes lab work  Will continue with Flomax 0 4 mg  If there is any increase in symptoms, can increase the dose  If there are any other concerns we can make a follow-up with Urology  Diagnoses and all orders for this visit:    Controlled type 2 diabetes mellitus without complication, without long-term current use of insulin ([facility])  -     CBC and differential; Future  -     Comprehensive metabolic panel; Future  -     HEMOGLOBIN A1C W/ EAG ESTIMATION; Future  -     Lipid panel; Future  -     Microalbumin / creatinine urine ratio; Future  -     Ambulatory referral to Ophthalmology; Future    Benign prostatic hyperplasia with urinary frequency    Urinary retention  -     tamsulosin (FLOMAX) 0 4 mg; Take 1 capsule (0 4 mg total) by mouth daily with dinner          Subjective:      Patient ID: Vamsi Red is a 52 y o  male  Patient presents today for a routine follow up  Patient denies any new complaints or concerns at this time  He states that he checks his sugars 3 times daily and has been in the low 100s  Patient is exercising 4-5 times a week  He admits to occasional blurry vision and states that he has not seen an eye doctor in long time   Patient also admits to some nocturia but attributes it to his BPH  Patient denies any headache, dizziness, nausea, vomiting or decreased sensation in bilateral lower extremities  Patient is still taking metformin once a day and states that he does not need any refills at this time  Diabetes   Pertinent negatives for hypoglycemia include no dizziness or headaches  Associated symptoms include polyuria  Pertinent negatives for diabetes include no chest pain  The following portions of the patient's history were reviewed and updated as appropriate:   He  has a past medical history of Diabetes mellitus ([facility] ); Hyperlipidemia; Hypertension; and Psychosis  He   Patient Active Problem List    Diagnosis Date Noted    Benign prostatic hyperplasia with urinary frequency 02/02/2018    Chronic back pain 01/04/2018    Diabetes mellitus type II, controlled ([facility] ) 10/17/2017    Vitamin D deficiency 10/17/2017    Concussion with no loss of consciousness 10/13/2017    Visual disturbances 10/13/2017    Insomnia 01/20/2016    Psychosis 01/20/2016    TBI (traumatic brain injury) ([facility] ) 12/08/2015    Weakness of left side of body 12/08/2015     He  has a past surgical history that includes Elevation of depressed skull fracture  His family history includes Diabetes in his father  He  reports that he has never smoked  He has never used smokeless tobacco  He reports that he does not drink alcohol or use drugs  Current Outpatient Prescriptions   Medication Sig Dispense Refill    atorvastatin (LIPITOR) 10 mg tablet Take 1 tablet by mouth daily      Blood Glucose Monitoring Suppl (FREESTYLE LITE) CHARAN Used device to check blood sugar once daily  1 each 0    divalproex sodium (DEPAKOTE ER) 500 mg 24 hr tablet Take 1,000 mg by mouth daily at bedtime  1    ergocalciferol (VITAMIN D2) 50,000 units Take 1 capsule by mouth once a week      glucose blood (FREESTYLE LITE) test strip Use trip to test blood sugar daily   100 each 1    Lancets (FREESTYLE) lancets Use lancets to check blood sugar daily  100 each 1    lisinopril (ZESTRIL) 5 mg tablet Take 1 tablet by mouth daily      magnesium oxide (MAG-OX) 400 mg tablet Take 1 tablet by mouth daily      metFORMIN (GLUCOPHAGE) 500 mg tablet Take 1 tablet by mouth daily      methocarbamol (ROBAXIN) 500 mg tablet Take 1 tablet by mouth 3 (three) times a day      naproxen (NAPROSYN) 500 mg tablet TAKE 1 TABLET EVERY 12 HOURS WITH FOOD AS NEEDED  2    QUEtiapine (SEROquel) 200 mg tablet Take 200 mg by mouth daily at bedtime  1    tamsulosin (FLOMAX) 0 4 mg Take 1 capsule (0 4 mg total) by mouth daily with dinner 90 capsule 1     No current facility-administered medications for this visit  He has No Known Allergies       Review of Systems   Constitutional: Negative for appetite change, chills, fever and unexpected weight change  HENT: Negative for rhinorrhea, sinus pressure, sneezing and sore throat  Eyes: Positive for visual disturbance (Patient admits to occasional blurred vision)  Respiratory: Negative for cough, shortness of breath and wheezing  Cardiovascular: Negative for chest pain and palpitations  Gastrointestinal: Negative for abdominal pain, constipation, diarrhea, nausea and vomiting  Endocrine: Positive for polyuria  Genitourinary: Negative for dysuria  Neurological: Negative for dizziness, syncope, light-headedness, numbness and headaches  Objective:      /70 (BP Location: Right arm, Patient Position: Sitting, Cuff Size: Adult)   Pulse 86   Temp 97 6 °F (36 4 °C) (Tympanic)   Resp 20   Ht 5' 6" (1 676 m)   Wt 111 kg (244 lb 11 2 oz)   SpO2 98%   BMI 39 50 kg/m²          Physical Exam   Constitutional: He is oriented to person, place, and time  He appears well-developed and well-nourished  No distress  HENT:   Head: Normocephalic and atraumatic  Eyes: Conjunctivae are normal  Pupils are equal, round, and reactive to light     Neck: Normal range of motion  Neck supple  Cardiovascular: Normal rate and regular rhythm  Pulmonary/Chest: Effort normal and breath sounds normal  No respiratory distress  He has no wheezes  Abdominal: Soft  Bowel sounds are normal  He exhibits no distension  Musculoskeletal: Normal range of motion  Lymphadenopathy:     He has no cervical adenopathy  Neurological: He is alert and oriented to person, place, and time  Skin: Skin is warm and dry  He is not diaphoretic

## 2018-03-27 NOTE — ASSESSMENT & PLAN NOTE
With concerns of blurry vision discussed with patient that he needs to make appointment with ophthalmologist every year, to check for diabetic retinopathy  Gave referral to [facility] for sight  At this time it is too early to recheck A1c, gave patient lab slip to get completed after 04/04/2018  If his blood sugars are in the low 100s, at this time do not expect make any changes to his diabetes medications  Once again discussed the importance of diet and exercise with patient  Make sure to watch carbohydrates in diet

## 2018-04-26 ENCOUNTER — TELEPHONE (OUTPATIENT)
Dept: FAMILY MEDICINE CLINIC | Facility: CLINIC | Age: 48
End: 2018-04-26

## 2018-04-26 DIAGNOSIS — G89.29 CHRONIC LOW BACK PAIN, UNSPECIFIED BACK PAIN LATERALITY, WITH SCIATICA PRESENCE UNSPECIFIED: Primary | ICD-10-CM

## 2018-04-26 DIAGNOSIS — M54.5 CHRONIC LOW BACK PAIN, UNSPECIFIED BACK PAIN LATERALITY, WITH SCIATICA PRESENCE UNSPECIFIED: Primary | ICD-10-CM

## 2018-04-30 ENCOUNTER — EVALUATION (OUTPATIENT)
Dept: PHYSICAL THERAPY | Facility: CLINIC | Age: 48
End: 2018-04-30
Payer: COMMERCIAL

## 2018-04-30 DIAGNOSIS — M54.5 CHRONIC LOW BACK PAIN, UNSPECIFIED BACK PAIN LATERALITY, WITH SCIATICA PRESENCE UNSPECIFIED: ICD-10-CM

## 2018-04-30 DIAGNOSIS — R53.1 WEAKNESS OF LEFT SIDE OF BODY: ICD-10-CM

## 2018-04-30 DIAGNOSIS — M54.50 ACUTE BILATERAL LOW BACK PAIN WITHOUT SCIATICA: Primary | ICD-10-CM

## 2018-04-30 DIAGNOSIS — G89.29 CHRONIC LOW BACK PAIN, UNSPECIFIED BACK PAIN LATERALITY, WITH SCIATICA PRESENCE UNSPECIFIED: ICD-10-CM

## 2018-04-30 PROCEDURE — G8990 OTHER PT/OT CURRENT STATUS: HCPCS

## 2018-04-30 PROCEDURE — G8991 OTHER PT/OT GOAL STATUS: HCPCS

## 2018-04-30 PROCEDURE — 97162 PT EVAL MOD COMPLEX 30 MIN: CPT

## 2018-04-30 NOTE — PROGRESS NOTES
PT Evaluation     Today's date: 2018  Patient name: Kwabena Lou  : 1970  MRN: [id number]  Referring provider: Evelyn Saucedo PA-C  Dx:   Encounter Diagnosis     ICD-10-CM    1  Weakness of left side of body R53 1    2  Acute bilateral low back pain without sciatica M54 5    3  Chronic low back pain, unspecified back pain laterality, with sciatica presence unspecified M54 5     G89 29        Start Time: 0930  Stop Time: 1000  Total time in clinic (min): 30 minutes    Assessment  Impairments: abnormal gait, abnormal or restricted ROM, activity intolerance, impaired physical strength, lacks appropriate home exercise program and pain with function    Assessment details: Patient is a 51 y/o male who presents with signs and symptoms consistent with a lumbar sprain/strain injury  Patient demonstrates functional mobility deficits secondary to increased pain, decreased AROM/PROM and diminished strength/endurance levels  Patient is a good rehabilitation candidate and will benefit from skilled PT intervention to address the above issues and help return the patient to their prior and/or modified level of function  Understanding of Dx/Px/POC: good   Prognosis: good  Prognosis details: + prognostic indicators include a willingness to participate in conservative management          Goals  Short Term Goal    1  Patient will report a 50% reduction in their subjective pain report (VAS) by 4 weeks  2   Patient will demonstrate a 50% improvement in AROM/PROM by 4 weeks  3   Patient will demonstrate (at least) a 1/2 grade strength improvement after 4 weeks  4   Ambulation/Balance/Stairclimbing will be improved by at least 50% after 4 weeks  5   Patient will improve FOTO score by at least 10 points by 4 weeks      Long Term Goal    1  Patient will demonstrate IADL at the prior/maximal level of function      2   Patient will demonstrate recreational performance at the prior and/or maximal level     3   Patient will demonstrate independence with their HEP  Plan  Patient would benefit from: skilled PT  Planned modality interventions: thermotherapy: hydrocollator packs and unattended electrical stimulation  Planned therapy interventions: joint mobilization, manual therapy, massage, flexibility, functional ROM exercises, therapeutic training, therapeutic exercise, therapeutic activities, home exercise program and abdominal trunk stabilization  Frequency: 2x week  Duration in weeks: 6  Treatment plan discussed with: patient        Subjective Evaluation    History of Present Illness  Mechanism of injury: Patient is a 53 y/o male who presents with acute, centralized LBP after falling in a hole in the street 3-4 months ago  He reports that he was walking outside his apartment at night and stepped into an unfilled pot hole in the street  He lost his balance and landed on his R hand and R buttock with immediate, intense pain  He was taken by ambulance to the ED where he was given a wrist brace and medication for his low back pain sxs  Significant past medical history includes a TBI incident with L sided muscular weakness  Patient is currently out of work due to his TBI sxs but enjoys recreational walking  Patient is currently unable to ambulate community distances secondary to his gradually worsening low back pain sxs    Quality of life: fair    Pain  Current pain ratin  At best pain ratin  At worst pain rating: 10  Location: LS junction  Quality: throbbing, squeezing, sharp, knife-like and cramping  Relieving factors: heat  Aggravating factors: standing and walking  Progression: worsening    Social Support  Lives in: apartment  Lives with: significant other    Employment status: not working  Treatments  Previous treatment: medication  Patient Goals  Patient goals for therapy: decreased pain, increased motion, return to sport/leisure activities and increased strength          Objective Palpation   Left   Tenderness of the erector spinae, lumbar paraspinals and quadratus lumborum  Right Tenderness of the erector spinae, lumbar paraspinals and quadratus lumborum  Additional Palpation Details  Patient indicates intense pain at bilateral spinal musculature from L1-S1    Tenderness     Left Hip   Tenderness in the PSIS  Right Hip   Tenderness in the PSIS       Neurological Testing     Reflexes   Left   Patellar (L4): normal (2+)  Achilles (S1): normal (2+)    Right   Patellar (L4): normal (2+)  Achilles (S1): normal (2+)    Active Range of Motion     Lumbar   Flexion: Active lumbar flexion: 50% with pain  Extension: Active lumbar extension: 25% with pain  Left lateral flexion: Active left lumbar lateral flexion: 50% with pain  Right lateral flexion: Active right lumbar lateral flexion: 50% with pain  Left rotation: Active left lumbar rotation: 50% with pain  Right rotation: Active right lumbar rotation: 50% with pain    Strength/Myotome Testing     Left Shoulder     Planes of Motion   Flexion: 4   Extension: 4+   Abduction: 4   Adduction: 4+   External rotation at 0°: 4   Internal rotation at 0°: 4+     Isolated Muscles   Supraspinatus: 4-     Right Shoulder     Planes of Motion   Flexion: 4+   Extension: 4+   Abduction: 4+   Adduction: 4+   External rotation at 0°: 4+   Internal rotation at 0°: 4+     Isolated Muscles   Supraspinatus: 4     Left Hip   Planes of Motion   Flexion: 4-  Extension: 4  Abduction: 4-  Adduction: 4+  External rotation: 4  Internal rotation: 4    Right Hip   Planes of Motion   Flexion: 4+  Extension: 4+  Abduction: 4+  Adduction: 4+  External rotation: 4+  Internal rotation: 4+    Left Knee   Flexion: 4+  Extension: 4+    Right Knee   Flexion: 4+  Extension: 5    Left Ankle/Foot   Dorsiflexion: 4+  Plantar flexion: 5    Right Ankle/Foot   Dorsiflexion: 5  Plantar flexion: 5    Muscle Activation     Additional Muscle Activation Details  Poor core stability Lumbar extensors are 4/5    Tests       Thoracic   Positive slump  Lumbar   Positive slumped  Left   Positive crossed SLR and passive SLR  Right   Positive crossed SLR and passive SLR  Left Pelvic Girdle/Sacrum   Positive: sacrum compression  Right Pelvic Girdle/Sacrum   Positive: sacrum compression  Left Hip   Positive Ely's, JOHN, FADIR and piriformis  Right Hip   Positive Ely's, JOHN, FADIR and piriformis  Ambulation     Observational Gait   Gait: antalgic and asymmetric   Decreased walking speed and stride length         Flowsheet Rows      Most Recent Value   PT/OT G-Codes   Current Score  52   Projected Score  64   FOTO information reviewed  Yes   Assessment Type  Evaluation   G code set  Other PT/OT Primary   Other PT Primary Current Status ()  CK   Other PT Primary Goal Status ()  CJ          Precautions: TBI w/ L sided weakness, DM, increased BMI, HTN      Daily Treatment Diary     Manual              STM             PA glides             Lumbar gapping Gr II-III                                           Exercise Diary              R bike             SKC             LTR             HS strap str             DLS progression             Pball rolllouts                                                                                                                                                                                                        Modalities              MH + Estim

## 2018-05-04 ENCOUNTER — OFFICE VISIT (OUTPATIENT)
Dept: PHYSICAL THERAPY | Facility: CLINIC | Age: 48
End: 2018-05-04
Payer: COMMERCIAL

## 2018-05-04 DIAGNOSIS — R53.1 WEAKNESS OF LEFT SIDE OF BODY: Primary | ICD-10-CM

## 2018-05-04 DIAGNOSIS — M54.5 CHRONIC LOW BACK PAIN, UNSPECIFIED BACK PAIN LATERALITY, WITH SCIATICA PRESENCE UNSPECIFIED: ICD-10-CM

## 2018-05-04 DIAGNOSIS — M54.50 ACUTE BILATERAL LOW BACK PAIN WITHOUT SCIATICA: ICD-10-CM

## 2018-05-04 DIAGNOSIS — G89.29 CHRONIC LOW BACK PAIN, UNSPECIFIED BACK PAIN LATERALITY, WITH SCIATICA PRESENCE UNSPECIFIED: ICD-10-CM

## 2018-05-04 PROCEDURE — 97110 THERAPEUTIC EXERCISES: CPT | Performed by: PHYSICAL THERAPIST

## 2018-05-04 PROCEDURE — 97140 MANUAL THERAPY 1/> REGIONS: CPT | Performed by: PHYSICAL THERAPIST

## 2018-05-04 PROCEDURE — 97150 GROUP THERAPEUTIC PROCEDURES: CPT | Performed by: PHYSICAL THERAPIST

## 2018-05-04 PROCEDURE — 97014 ELECTRIC STIMULATION THERAPY: CPT | Performed by: PHYSICAL THERAPIST

## 2018-05-04 NOTE — PROGRESS NOTES
Daily Note     Today's date: 2018  Patient name: Jose Gould  : 1970  MRN: [id number]  Referring provider: Lana Gray PA-C  Dx:   Encounter Diagnosis     ICD-10-CM    1  Weakness of left side of body R53 1    2  Acute bilateral low back pain without sciatica M54 5    3  Chronic low back pain, unspecified back pain laterality, with sciatica presence unspecified M54 5     G89 29                   Subjective: Patient stated pain 7/10 prior to treatment session this visit; patient states he is performing HEP without difficulty  Objective: See treatment diary below  Precautions: TBI w/ L sided weakness, DM, increased BMI, HTN       Daily Treatment Diary      Manual                        STM  KK                     NICKY cervantes  np                     Lumbar gapping Gr II-III  np                                                                           Exercise Diary                        R bike  5 min                     SKC  5x5"                     LTR  10x5"                     HS strap str  30"x3                     Sup TA activ   10x5"                     Bridges   pain                      Rollouts with Pball in sitting  10x5"                                                                                                                                                                                                                                                                                                                                                   Modalities                        MH + Estim  10'                                                                            Assessment: Patient unable to tolerate L/S mobilizations secondary to c/o pain; patient unable to perform bridges secondary to c/o pain; stated pain decreased to 4/10 after MH/TENS application  Plan: Continue per plan of care

## 2018-05-08 ENCOUNTER — OFFICE VISIT (OUTPATIENT)
Dept: PHYSICAL THERAPY | Facility: CLINIC | Age: 48
End: 2018-05-08
Payer: COMMERCIAL

## 2018-05-08 DIAGNOSIS — G89.29 CHRONIC LOW BACK PAIN, UNSPECIFIED BACK PAIN LATERALITY, WITH SCIATICA PRESENCE UNSPECIFIED: ICD-10-CM

## 2018-05-08 DIAGNOSIS — R53.1 WEAKNESS OF LEFT SIDE OF BODY: Primary | ICD-10-CM

## 2018-05-08 DIAGNOSIS — M54.5 CHRONIC LOW BACK PAIN, UNSPECIFIED BACK PAIN LATERALITY, WITH SCIATICA PRESENCE UNSPECIFIED: ICD-10-CM

## 2018-05-08 DIAGNOSIS — M54.50 ACUTE BILATERAL LOW BACK PAIN WITHOUT SCIATICA: ICD-10-CM

## 2018-05-08 PROCEDURE — 97014 ELECTRIC STIMULATION THERAPY: CPT

## 2018-05-08 PROCEDURE — 97140 MANUAL THERAPY 1/> REGIONS: CPT

## 2018-05-08 PROCEDURE — 97110 THERAPEUTIC EXERCISES: CPT

## 2018-05-08 NOTE — PROGRESS NOTES
Daily Note     Today's date: 2018  Patient name: Ty Quezada  : 1970  MRN: [id number]  Referring provider: Mary More PA-C  Dx:   Encounter Diagnosis     ICD-10-CM    1  Weakness of left side of body R53 1    2  Acute bilateral low back pain without sciatica M54 5    3  Chronic low back pain, unspecified back pain laterality, with sciatica presence unspecified M54 5     G89 29                   Subjective: Patient stated pain 7/10 pre PT session  Pt states unknown reason for increase in pain  Pt denies pain post PT session  Objective: See treatment diary below  Precautions: TBI w/ L sided weakness, DM, increased BMI, HTN       Daily Treatment Diary      Manual   18                   STM  KK  PK                   NICKY cervantes  np  np                   Lumbar gapping Gr II-III  np  np                                                                         Exercise Diary   18                   R bike  5 min  5 min                   SKC  5x5"  5" x 5                   LTR  10x5"  5" x 10                   HS strap str  30"x3  30" x 3                   Sup TA activ   10x5"  5" x 10                   Bridges   pain  15x                    Rollouts with Pball in sitting  10x5"  5" x 10                                                                                                                                                                                                                                                                                                                                                 Modalities   18                   MH + Estim  10'  10'                                                                          Assessment: Pt able to perform bridges this visit with no complaints of increased pain  Pt  Required cues for correct hold for hamstring stretches secondary to too short of hold  Progress core strengthening as able       Plan: Pt admitted at [phone]. Safety precautions initiated. Bed in low position. Bed alarm placed. Call light within reach. Pt and family were oriented to room. Pt and family updated of plan of care. Will continue to monitor. Continue per plan of care

## 2018-05-11 ENCOUNTER — OFFICE VISIT (OUTPATIENT)
Dept: PHYSICAL THERAPY | Facility: CLINIC | Age: 48
End: 2018-05-11
Payer: COMMERCIAL

## 2018-05-11 DIAGNOSIS — M54.50 ACUTE BILATERAL LOW BACK PAIN WITHOUT SCIATICA: ICD-10-CM

## 2018-05-11 DIAGNOSIS — R53.1 WEAKNESS OF LEFT SIDE OF BODY: Primary | ICD-10-CM

## 2018-05-11 DIAGNOSIS — M54.5 CHRONIC LOW BACK PAIN, UNSPECIFIED BACK PAIN LATERALITY, WITH SCIATICA PRESENCE UNSPECIFIED: ICD-10-CM

## 2018-05-11 DIAGNOSIS — G89.29 CHRONIC LOW BACK PAIN, UNSPECIFIED BACK PAIN LATERALITY, WITH SCIATICA PRESENCE UNSPECIFIED: ICD-10-CM

## 2018-05-11 PROCEDURE — 97014 ELECTRIC STIMULATION THERAPY: CPT | Performed by: PHYSICAL MEDICINE & REHABILITATION

## 2018-05-11 PROCEDURE — 97110 THERAPEUTIC EXERCISES: CPT | Performed by: PHYSICAL MEDICINE & REHABILITATION

## 2018-05-11 PROCEDURE — 97010 HOT OR COLD PACKS THERAPY: CPT | Performed by: PHYSICAL MEDICINE & REHABILITATION

## 2018-05-11 NOTE — PROGRESS NOTES
Daily Note     Today's date: 2018  Patient name: Edita Muniz  : 1970  MRN: [id number]  Referring provider: Xena Powell PA-C  Dx:   Encounter Diagnosis     ICD-10-CM    1  Weakness of left side of body R53 1    2  Acute bilateral low back pain without sciatica M54 5    3  Chronic low back pain, unspecified back pain laterality, with sciatica presence unspecified M54 5     G89 29        Start Time: 720  Stop Time: 818  Total time in clinic (min): 58 minutes    Subjective: Pt states that he has " a little pain" , rated at 7/10  Objective: See treatment diary below  Precautions: TBI w/ L sided weakness, DM, increased BMI, HTN       Daily Treatment Diary      Manual   18                 STM  KK  PK                   NICKY cervantes  np  np                   Lumbar gapping Gr II-III  np  np                                                                         Exercise Diary   18                 R bike  5 min  5 min  6 min                 SKC  5x5"  5" x 5  Pain                 LTR  10x5"  5" x 10  5" x 10                 HS stretch seated   5 x 20"          HS strap str DC   30"x3  30" x 3  DC                 Sup TA activ   10x5"  5" x 10  DC                 Bridges c TA iso  pain  15x  c iso TA x 15                  Rollouts with Pball in sitting F-R-L  10x5"  5" x 10  10" x 10 ea                  Prayer stretch F-R-L      30" x 3 ea                                                                                                                                                                                                                                                                                                                       Modalities   18                 MH + Estim  10'  10'  10'                                                                          Assessment: Tolerated treatment fair   Patient reports pain with stretches and asks for TENS and heat, pt educated on the importance of TE to address the the underlying cause of pain and that modalities are not going to address these deficits      Plan: Progress treatment as tolerated

## 2018-05-18 ENCOUNTER — OFFICE VISIT (OUTPATIENT)
Dept: PHYSICAL THERAPY | Facility: CLINIC | Age: 48
End: 2018-05-18
Payer: COMMERCIAL

## 2018-05-18 DIAGNOSIS — R53.1 WEAKNESS OF LEFT SIDE OF BODY: Primary | ICD-10-CM

## 2018-05-18 DIAGNOSIS — M54.50 ACUTE BILATERAL LOW BACK PAIN WITHOUT SCIATICA: ICD-10-CM

## 2018-05-18 DIAGNOSIS — G89.29 CHRONIC LOW BACK PAIN, UNSPECIFIED BACK PAIN LATERALITY, WITH SCIATICA PRESENCE UNSPECIFIED: ICD-10-CM

## 2018-05-18 DIAGNOSIS — M54.5 CHRONIC LOW BACK PAIN, UNSPECIFIED BACK PAIN LATERALITY, WITH SCIATICA PRESENCE UNSPECIFIED: ICD-10-CM

## 2018-05-18 PROCEDURE — 97110 THERAPEUTIC EXERCISES: CPT | Performed by: PHYSICAL MEDICINE & REHABILITATION

## 2018-05-18 PROCEDURE — 97150 GROUP THERAPEUTIC PROCEDURES: CPT | Performed by: PHYSICAL MEDICINE & REHABILITATION

## 2018-05-18 NOTE — PROGRESS NOTES
Daily Note     Today's date: 2018  Patient name: Kwabena Lou  : 1970  MRN: [id number]  Referring provider: Evelyn Saucedo PA-C  Dx:   Encounter Diagnosis     ICD-10-CM    1  Weakness of left side of body R53 1    2  Acute bilateral low back pain without sciatica M54 5    3  Chronic low back pain, unspecified back pain laterality, with sciatica presence unspecified M54 5     G89 29        Start Time: 727  Stop Time: 820  Total time in clinic (min): 53 minutes    Subjective: Pt reports 8/10 pain prior to tx  He states that he felt better after last tx and improved sx when he performs his HEP       Objective: See treatment diary below  Precautions: TBI w/ L sided weakness, DM, increased BMI, HTN       Daily Treatment Diary      Manual   18               STM  KK  PK                   NICKY cervantes  np  np                   Lumbar gapping Gr II-III  np  np    NC                                                                     Exercise Diary   18               R bike  5 min  5 min  6 min  6 min               SKC  5x5"  5" x 5  Pain                 LTR  10x5"  5" x 10  5" x 10  10" x 5 ea               HS stretch seated     5 x 20"  5 x 20"               HS strap str DC   30"x3  30" x 3  DC                 Sup TA activ   10x5"  5" x 10  DC                 Bridges c TA iso  pain  15x  c iso TA x 15                  Rollouts with Pball in sitting F-R-L  10x5"  5" x 10  10" x 10 ea  10" x 10 ea                Prayer stretch F-R-L      30" x 3 ea  20" x 5                                                                                                                                                                                                                                                                                                                     Modalities   18               MH + Estim  10'  10'  10'  10'                                                                        Assessment: Tolerated treatment well  Patient Reduction in pt report of sx post manual, increased quality and quantity of movement  Reports 0/10 pain post treatment  Plan: Progress treatment as tolerated

## 2018-05-22 ENCOUNTER — OFFICE VISIT (OUTPATIENT)
Dept: PHYSICAL THERAPY | Facility: CLINIC | Age: 48
End: 2018-05-22
Payer: COMMERCIAL

## 2018-05-22 DIAGNOSIS — R53.1 WEAKNESS OF LEFT SIDE OF BODY: Primary | ICD-10-CM

## 2018-05-22 DIAGNOSIS — M54.50 ACUTE BILATERAL LOW BACK PAIN WITHOUT SCIATICA: ICD-10-CM

## 2018-05-22 DIAGNOSIS — G89.29 CHRONIC LOW BACK PAIN, UNSPECIFIED BACK PAIN LATERALITY, WITH SCIATICA PRESENCE UNSPECIFIED: ICD-10-CM

## 2018-05-22 DIAGNOSIS — M54.5 CHRONIC LOW BACK PAIN, UNSPECIFIED BACK PAIN LATERALITY, WITH SCIATICA PRESENCE UNSPECIFIED: ICD-10-CM

## 2018-05-22 PROCEDURE — 97014 ELECTRIC STIMULATION THERAPY: CPT

## 2018-05-22 PROCEDURE — 97110 THERAPEUTIC EXERCISES: CPT

## 2018-05-22 NOTE — PROGRESS NOTES
Daily Note     Today's date: 2018  Patient name: Henrry Sexton  : 1970  MRN: [id number]  Referring provider: Nick Riley PA-C  Dx:   Encounter Diagnosis     ICD-10-CM    1  Weakness of left side of body R53 1    2  Acute bilateral low back pain without sciatica M54 5    3  Chronic low back pain, unspecified back pain laterality, with sciatica presence unspecified M54 5     G89 29                   Subjective: Patient c/o left LB pain this morning at a pain level 8/10  He indicates he did not take any pain meds this a m      Objective: See treatment diary below     Daily Treatment Diary      Manual   18             STM  KK  PK                   NICKY cervantes  np  np                   Lumbar gapping Gr II-III  np  np    NC  NC                                                                   Exercise Diary   18             R bike  5 min  5 min  6 min  6 min  8 min             SKC  5x5"  5" x 5  Pain    5"x5             LTR  10x5"  5" x 10  5" x 10  10" x 5 ea  10"x5             HS stretch seated     5 x 20"  5 x 20"  5x20"             HS strap str DC   30"x3  30" x 3  DC    DC             Sup TA activ   10x5"  5" x 10  DC    DC             Bridges c TA iso  pain  15x  c iso TA x 15   TA 5" x  10              Rollouts with Pball in sitting F-R-L  10x5"  5" x 10  10" x 10 ea  10" x 10 ea  10" x  10 ea              Prayer stretch F-R-L      30" x 3 ea  20" x 5  PAIN after 5 reps                                                                                                                                                                                                                                                                                                                   Modalities   18             MH + Estim  10'  10'  10'  10'  10'                                                                      Assessment: Tolerated treatment fairly well  Mobs performed by the DPT resulted in no change in Patient's symptoms, however Patient did report relief after the stretches and the MH/TENS  Plan: Continue therapy as tolerated per plan of care

## 2018-05-29 ENCOUNTER — OFFICE VISIT (OUTPATIENT)
Dept: PHYSICAL THERAPY | Facility: CLINIC | Age: 48
End: 2018-05-29
Payer: COMMERCIAL

## 2018-05-29 DIAGNOSIS — M54.50 ACUTE BILATERAL LOW BACK PAIN WITHOUT SCIATICA: ICD-10-CM

## 2018-05-29 DIAGNOSIS — R53.1 WEAKNESS OF LEFT SIDE OF BODY: Primary | ICD-10-CM

## 2018-05-29 DIAGNOSIS — G89.29 CHRONIC LOW BACK PAIN, UNSPECIFIED BACK PAIN LATERALITY, WITH SCIATICA PRESENCE UNSPECIFIED: ICD-10-CM

## 2018-05-29 DIAGNOSIS — M54.5 CHRONIC LOW BACK PAIN, UNSPECIFIED BACK PAIN LATERALITY, WITH SCIATICA PRESENCE UNSPECIFIED: ICD-10-CM

## 2018-05-29 PROCEDURE — G8991 OTHER PT/OT GOAL STATUS: HCPCS | Performed by: PHYSICAL MEDICINE & REHABILITATION

## 2018-05-29 PROCEDURE — 97110 THERAPEUTIC EXERCISES: CPT

## 2018-05-29 PROCEDURE — 97150 GROUP THERAPEUTIC PROCEDURES: CPT

## 2018-05-29 PROCEDURE — G8990 OTHER PT/OT CURRENT STATUS: HCPCS | Performed by: PHYSICAL MEDICINE & REHABILITATION

## 2018-05-29 NOTE — PROGRESS NOTES
Daily Note     Today's date: 2018  Patient name: Peitro Russell  : 1970  MRN: [id number]  Referring provider: Raymond Talavera PA-C  Dx: No diagnosis found  Subjective: Patient reports his back is a little better with a pain level 5-6/10 in the central low back  Objective: See treatment diary below     Manual   18           STM  KK  PK                   NICKY cervantes  np  np                   Lumbar gapping Gr II-III  np  np   [facility]                                                                   Exercise Diary   18           R bike  5 min  5 min  6 min  6 min  8 min  8 min           SKC  5x5"  5" x 5  Pain    5"x5  5'x5           LTR  10x5"  5" x 10  5" x 10  10" x 5 ea  10"x5  10"x5           HS stretch seated     5 x 20"  5 x 20"  5x20"  5x20"           HS strap str DC   30"x3  30" x 3  DC    DC DC           Sup TA activ   10x5"  5" x 10  DC    DC  DC           Bridges c TA iso  pain  15x  c iso TA x 15   TA 5" x  10 TA 5"  X 15            Rollouts with Pball in sitting F-R-L  10x5"  5" x 10  10" x 10 ea  10" x 10 ea  10" x  10 ea  10"x10  ea            Prayer stretch F-R-L      30" x 3 ea  20" x 5  PAIN after 5 reps  PAIN                                                                                                                                                                                                                                                                                                                 Modalities   18            + Estim  10'  10'  10'  10'  10' 10'                                                                  Assessment: Tolerated treatment well  No reports of increased pain with the current exercises except for the prayer stretch  Patient would benefit from continued therapy  Plan: Continue therapy as tolerated per plan of care

## 2018-06-01 ENCOUNTER — OFFICE VISIT (OUTPATIENT)
Dept: PHYSICAL THERAPY | Facility: CLINIC | Age: 48
End: 2018-06-01
Payer: COMMERCIAL

## 2018-06-01 DIAGNOSIS — M54.5 CHRONIC LOW BACK PAIN, UNSPECIFIED BACK PAIN LATERALITY, WITH SCIATICA PRESENCE UNSPECIFIED: ICD-10-CM

## 2018-06-01 DIAGNOSIS — R53.1 WEAKNESS OF LEFT SIDE OF BODY: Primary | ICD-10-CM

## 2018-06-01 DIAGNOSIS — M54.50 ACUTE BILATERAL LOW BACK PAIN WITHOUT SCIATICA: ICD-10-CM

## 2018-06-01 DIAGNOSIS — G89.29 CHRONIC LOW BACK PAIN, UNSPECIFIED BACK PAIN LATERALITY, WITH SCIATICA PRESENCE UNSPECIFIED: ICD-10-CM

## 2018-06-01 PROCEDURE — 97014 ELECTRIC STIMULATION THERAPY: CPT | Performed by: PHYSICAL MEDICINE & REHABILITATION

## 2018-06-01 PROCEDURE — 97110 THERAPEUTIC EXERCISES: CPT | Performed by: PHYSICAL MEDICINE & REHABILITATION

## 2018-06-01 PROCEDURE — 97140 MANUAL THERAPY 1/> REGIONS: CPT | Performed by: PHYSICAL MEDICINE & REHABILITATION

## 2018-06-01 PROCEDURE — 97150 GROUP THERAPEUTIC PROCEDURES: CPT | Performed by: PHYSICAL MEDICINE & REHABILITATION

## 2018-06-01 NOTE — PROGRESS NOTES
Daily Note     Today's date: 2018  Patient name: Pietro Russell  : 1970  MRN: [id number]  Referring provider: Raymond Talavera PA-C  Dx:   Encounter Diagnosis     ICD-10-CM    1  Weakness of left side of body R53 1    2  Acute bilateral low back pain without sciatica M54 5    3  Chronic low back pain, unspecified back pain laterality, with sciatica presence unspecified M54 5     G89 29        Start Time: 730  Stop Time: 827  Total time in clinic (min): 57 minutes    Subjective: Pt reports 8/10 pain, he states that he felt better after last tx   0/10 post tx        Objective: See treatment diary below     Manual   18         STM  KK  PK                   NICKY cervantes  np  np                   Lumbar gapping Gr II-III  np  np   [facility]                                                                   Exercise Diary   18         R bike  5 min  5 min  6 min  6 min  8 min  8 min  8'         SKC  5x5"  5" x 5  Pain    5"x5  5'x5  20" x 5         LTR  10x5"  5" x 10  5" x 10  10" x 5 ea  10"x5  10"x5  5" x 5 ea         HS stretch seated     5 x 20"  5 x 20"  5x20"  5x20"  3 x 30"          HS strap str DC   30"x3  30" x 3  DC    DC DC           Sup TA activ   10x5"  5" x 10  DC    DC  DC           Bridges c TA iso  pain  15x  c iso TA x 15   TA 5" x  10 TA 5"  X 15            Rollouts with Pball in sitting F-R-L  10x5"  5" x 10  10" x 10 ea  10" x 10 ea  10" x  10 ea  10"x10  ea  10" x 5 ea          Prayer stretch F-R-L      30" x 3 ea  20" x 5  PAIN after 5 reps  PAIN  seated on stool  3 x 30" ea                                                                                                                                                                                                                                                                                                               Modalities   18  5/29 6/1         MH + Estim  10'  10'  10'  10'  10' 10'  10'                                                                  Assessment: Tolerated treatment fair  Patient exhibited good technique with therapeutic exercises and TE was modified to decrease pain and allow the pt to complete  It is questionable if the pt is compliant with HEP  Plan: Progress treatment as tolerated

## 2018-06-05 ENCOUNTER — EVALUATION (OUTPATIENT)
Dept: PHYSICAL THERAPY | Facility: CLINIC | Age: 48
End: 2018-06-05
Payer: COMMERCIAL

## 2018-06-05 DIAGNOSIS — G89.29 CHRONIC LOW BACK PAIN, UNSPECIFIED BACK PAIN LATERALITY, WITH SCIATICA PRESENCE UNSPECIFIED: ICD-10-CM

## 2018-06-05 DIAGNOSIS — R53.1 WEAKNESS OF LEFT SIDE OF BODY: Primary | ICD-10-CM

## 2018-06-05 DIAGNOSIS — M54.50 ACUTE BILATERAL LOW BACK PAIN WITHOUT SCIATICA: ICD-10-CM

## 2018-06-05 DIAGNOSIS — M54.5 CHRONIC LOW BACK PAIN, UNSPECIFIED BACK PAIN LATERALITY, WITH SCIATICA PRESENCE UNSPECIFIED: ICD-10-CM

## 2018-06-05 PROCEDURE — G8990 OTHER PT/OT CURRENT STATUS: HCPCS | Performed by: PHYSICAL MEDICINE & REHABILITATION

## 2018-06-05 PROCEDURE — G8991 OTHER PT/OT GOAL STATUS: HCPCS | Performed by: PHYSICAL MEDICINE & REHABILITATION

## 2018-06-05 PROCEDURE — 97140 MANUAL THERAPY 1/> REGIONS: CPT | Performed by: PHYSICAL MEDICINE & REHABILITATION

## 2018-06-05 PROCEDURE — 97110 THERAPEUTIC EXERCISES: CPT | Performed by: PHYSICAL MEDICINE & REHABILITATION

## 2018-06-05 NOTE — PROGRESS NOTES
PT Re-Evaluation     Today's date: 2018  Patient name: Milind Coy  : 1970  MRN: [id number]  Referring provider: Alexandria Chavez PA-C  Dx:   Encounter Diagnosis     ICD-10-CM    1  Weakness of left side of body R53 1    2  Acute bilateral low back pain without sciatica M54 5    3  Chronic low back pain, unspecified back pain laterality, with sciatica presence unspecified M54 5     G89 29        Start Time: 730  Stop Time: 830  Total time in clinic (min): 60 minutes    Assessment  Impairments: abnormal gait, abnormal or restricted ROM, activity intolerance, impaired physical strength, lacks appropriate home exercise program and pain with function    Assessment details: Jailene Ortiz has been attending outpatient physical therapy with Weakness of left side of body, Acute bilateral low back pain without sciatica, Chronic low back pain  Since the last assessment, patient demonstrates decreased pain levels, however there is little carryover  There has been minimal changes in  range of motion,strength, or tolerance to activity  Pt continues to present with pain, demonstrate decreased range of motion, decreased strength, lumbar instability, and decreased tolerance to activity  The pt performs many standing activities with excessive lumbar lordosis  Pt would benefit from continued skilled physical therapy to address limitations and to achieve goals  Thank you for this referral    Understanding of Dx/Px/POC: fair   Prognosis: fair  Prognosis details:         Goals  Short Term Goal    1  Patient will report a 50% reduction in their subjective pain report (VAS) by 4 weeks  NOT MET         2  Patient will demonstrate a 50% improvement in AROM/PROM by 4 weeks  NOT MET    3  Patient will demonstrate (at least) a 1/2 grade strength improvement after 4 weeks  NOT MET    4  Ambulation/Balance/Stairclimbing will be improved by at least 50% after 4 weeks  NOT MET    5    Patient will improve FOTO score by at least 10 points by 4 weeks NOT MET      Long Term Goal    1  Patient will demonstrate IADL at the prior/maximal level of function  NOT MET    2  Patient will demonstrate recreational performance at the prior and/or maximal level  NOT MET    3  Patient will demonstrate independence with their HEP   NOT MET    Plan  Patient would benefit from: skilled PT  Planned modality interventions: thermotherapy: hydrocollator packs and unattended electrical stimulation  Planned therapy interventions: joint mobilization, manual therapy, massage, flexibility, functional ROM exercises, therapeutic training, therapeutic exercise, therapeutic activities, home exercise program and abdominal trunk stabilization  Frequency: 2x week  Duration in weeks: 4  Treatment plan discussed with: patient  Plan details: The pt has been advised that he must be compliant with HEP in order to see a long lasting effect from PT treatment  The pt follows up with his referring physician on 6/13/18 , the decision will be made whether to continue treatment at that time due to no significant changes in subjective or objective measures since beginning PT  Subjective Evaluation    History of Present Illness  Mechanism of injury: Patient is a 53 y/o male who presents with acute, centralized LBP after falling in a hole in the street 3-4 months ago  He reports that he was walking outside his apartment at night and stepped into an unfilled pot hole in the street  He lost his balance and landed on his R hand and R buttock with immediate, intense pain  He was taken by ambulance to the ED where he was given a wrist brace and medication for his low back pain sxs  Significant past medical history includes a TBI incident with L sided muscular weakness   (6/5/18) Pt reports constant 7/10 pain  He states that he has relief after treatment, however it is short lived  He reports compliance with HEP and states that he has minimal relief with performance   The pt has an appointment with the referring physician on 18  Patient is currently out of work due to his TBI sxs but enjoys recreational walking  Patient is currently unable to ambulate community distances secondary to his gradually worsening low back pain sxs  Quality of life: fair    Pain  Current pain ratin  At best pain ratin  At worst pain rating: 10  Location: LS junction  Quality: throbbing, squeezing, sharp, knife-like and cramping  Relieving factors: heat  Aggravating factors: standing and walking  Progression: worsening    Social Support  Lives in: apartment  Lives with: significant other    Employment status: not working  Treatments  Previous treatment: medication  Patient Goals  Patient goals for therapy: decreased pain, increased motion, return to sport/leisure activities and increased strength          Objective     Static Posture     Head  Forward  Shoulders  Rounded  Thoracic Spine  Hypokyphosis  Lumbar Spine   Increased lordosis  Palpation   Left   Tenderness of the erector spinae, lumbar paraspinals and quadratus lumborum  Right Tenderness of the erector spinae, lumbar paraspinals and quadratus lumborum  Additional Palpation Details  Patient indicates intense pain at bilateral spinal musculature from L1-S1    Tenderness     Left Hip   Tenderness in the PSIS  Right Hip   Tenderness in the PSIS       Neurological Testing     Reflexes   Left   Patellar (L4): normal (2+)  Achilles (S1): normal (2+)    Right   Patellar (L4): normal (2+)  Achilles (S1): normal (2+)    Active Range of Motion     Lumbar   Flexion: 65 (50%) degrees with pain  Extension: 10 (25%) degrees with pain  Left lateral flexion: 15 (50%) degrees with pain  Right lateral flexion: 25 (50%) degrees with pain  Left rotation: Active left lumbar rotation: 50% with pain  Right rotation: Active right lumbar rotation: 50% with pain    Passive Range of Motion   Left Hip   Flexion: 90 degrees with pain  External rotation (90/90): 45 degrees with pain  Internal rotation (90/90): 35 degrees with pain    Right Hip   Flexion: 90 degrees with pain  External rotation (90/90): 40 degrees with pain  Internal rotation (90/90): 30 degrees with pain    Joint Play Hypomobile: T1, T2, T3, T4, T5, T6, T7, T8, T9, T10, T11 and T12     Strength/Myotome Testing     Left Shoulder     Planes of Motion   Flexion: 4   Extension: 4+   Abduction: 4   Adduction: 4+   External rotation at 0°: 4   Internal rotation at 0°: 4+     Isolated Muscles   Supraspinatus: 4-     Right Shoulder     Planes of Motion   Flexion: 4+   Extension: 4+   Abduction: 4+   Adduction: 4+   External rotation at 0°: 4+   Internal rotation at 0°: 4+     Isolated Muscles   Supraspinatus: 4     Left Hip   Planes of Motion   Flexion: 4-  Extension: 4  Abduction: 4-  Adduction: 4+  External rotation: 4  Internal rotation: 4    Right Hip   Planes of Motion   Flexion: 4+  Extension: 4+  Abduction: 4+  Adduction: 4+  External rotation: 4+  Internal rotation: 4+    Left Knee   Flexion: 4+  Extension: 4+    Right Knee   Flexion: 4+  Extension: 5    Left Ankle/Foot   Dorsiflexion: 4+  Plantar flexion: 5    Right Ankle/Foot   Dorsiflexion: 5  Plantar flexion: 5    Muscle Activation     Additional Muscle Activation Details  Poor core stability     Lumbar extensors are 4/5    Tests       Thoracic   Positive slump  Lumbar   Positive prone instability  and slumped  Left   Positive passive SLR  Right   Positive passive SLR  Left Pelvic Girdle/Sacrum   Negative: sacrum compression  Right Pelvic Girdle/Sacrum   Negative: sacrum compression  Left Hip   Positive Ely's, JOHN, FADIR and piriformis  Right Hip   Positive Ely's, JOHN, FADIR and piriformis  Additional Tests Details  SLR: R 65 , L 65 deg    Ambulation     Observational Gait   Gait: antalgic and asymmetric   Decreased walking speed and stride length     Left foot contact pattern: foot flat  Right foot contact pattern: foot flat    Additional Observational Gait Details  B LE ER        Flowsheet Rows      Most Recent Value   PT/OT G-Codes   Current Score  53   Projected Score  64   FOTO information reviewed  Yes   Assessment Type  Re-evaluation   G code set  Other PT/OT Primary   Other PT Primary Current Status ()  CJ   Other PT Primary Goal Status ()  CK          Precautions: TBI w/ L sided weakness, DM, increased BMI, HTN      Daily Treatment Diary   MHP->STM->Manual->TE->Modalities  Manual   5/4 5/8/18 5/11 5/18 5/22 5/29 6/1  6/5       IASTM  KK  PK                   PA glides  np  np                   Lumbar gapping Gr II-III  np  np   [facility]              T/S PA                                                     Exercise Diary   5/4 5/8/18 5/11 5/18 5/22 5/29 6/1  6/5       MHP warm up             R bike  5 min  5 min  6 min  6 min  8 min  8 min  8'  5'       TM        2' pain     SKC  5x5"  5" x 5  Pain    5"x5  5'x5  20" x 5         LTR  10x5"  5" x 10  5" x 10  10" x 5 ea  10"x5  10"x5  5" x 5 ea         HS stretch seated     5 x 20"  5 x 20"  5x20"  5x20"  3 x 30"          HS strap str DC   30"x3  30" x 3  DC    DC DC           Sup TA activ   10x5"  5" x 10  DC    DC  DC           Bridges c TA iso  pain  15x  c iso TA x 15   TA 5" x  10 TA 5"  X 15            Rollouts with Pball in sitting F-R-L  10x5"  5" x 10  10" x 10 ea  10" x 10 ea  10" x  10 ea  10"x10  ea  10" x 5 ea          Prayer stretch F-R-L      30" x 3 ea  20" x 5  PAIN after 5 reps  PAIN  seated on stool  3 x 30" ea  seated on stool 3 x 30"       1/2 prone multifidi                10 ea c 5" hold        multifidus walkouts                OTB x 3 ea                                                                                                                                                                                                                                                             Modalities   5/4   5/8/18 5/11 5/18 5/22 5/29 6/1          + Estim  10'  10'  10'  10'  10' 10'  10'

## 2018-06-07 ENCOUNTER — APPOINTMENT (OUTPATIENT)
Dept: PHYSICAL THERAPY | Facility: CLINIC | Age: 48
End: 2018-06-07
Payer: COMMERCIAL

## 2018-06-12 ENCOUNTER — EVALUATION (OUTPATIENT)
Dept: PHYSICAL THERAPY | Facility: CLINIC | Age: 48
End: 2018-06-12
Payer: COMMERCIAL

## 2018-06-12 DIAGNOSIS — M54.5 CHRONIC LOW BACK PAIN, UNSPECIFIED BACK PAIN LATERALITY, WITH SCIATICA PRESENCE UNSPECIFIED: ICD-10-CM

## 2018-06-12 DIAGNOSIS — R53.1 WEAKNESS OF LEFT SIDE OF BODY: Primary | ICD-10-CM

## 2018-06-12 DIAGNOSIS — M54.50 ACUTE BILATERAL LOW BACK PAIN WITHOUT SCIATICA: ICD-10-CM

## 2018-06-12 DIAGNOSIS — G89.29 CHRONIC LOW BACK PAIN, UNSPECIFIED BACK PAIN LATERALITY, WITH SCIATICA PRESENCE UNSPECIFIED: ICD-10-CM

## 2018-06-12 PROCEDURE — 97112 NEUROMUSCULAR REEDUCATION: CPT | Performed by: PHYSICAL MEDICINE & REHABILITATION

## 2018-06-12 PROCEDURE — 97140 MANUAL THERAPY 1/> REGIONS: CPT | Performed by: PHYSICAL MEDICINE & REHABILITATION

## 2018-06-12 PROCEDURE — 97110 THERAPEUTIC EXERCISES: CPT | Performed by: PHYSICAL MEDICINE & REHABILITATION

## 2018-06-12 NOTE — PROGRESS NOTES
Daily Note     Today's date: 2018  Patient name: Beba Howard  : 1970  MRN: [id number]  Referring provider: Britney Colbert PA-C  Dx:   Encounter Diagnosis     ICD-10-CM    1  Weakness of left side of body R53 1    2  Acute bilateral low back pain without sciatica M54 5    3  Chronic low back pain, unspecified back pain laterality, with sciatica presence unspecified M54 5     G89 29        Start Time: 07  Stop Time: 08  Total time in clinic (min): 56 minutes    Subjective: Pt states that he is feeling a little better today  0/10 post tx           Objective: See treatment diary below  Precautions: TBI w/ L sided weakness, DM, increased BMI, HTN       Daily Treatment Diary   MHP->STM->Manual->TE->Modalities  Manual   18     IASTM  KK  PK                   NICKY cervantes  np  np                   Lumbar gapping Gr II-III  np  np    NC  NC        NC  Gr V      T/S PA                  NC  Gr V                                   Exercise Diary   18     MHP warm up                  10'     R bike  5 min  5 min  6 min  6 min  8 min  8 min  8'  5'       TM               2' pain       SKC  5x5"  5" x 5  Pain    5"x5  5'x5  20" x 5         LTR  10x5"  5" x 10  5" x 10  10" x 5 ea  10"x5  10"x5  5" x 5 ea         HS stretch seated     5 x 20"  5 x 20"  5x20"  5x20"  3 x 30"   3 x 30"       HS strap str DC   30"x3  30" x 3  DC    DC DC           Sup TA activ   10x5"  5" x 10  DC    DC  DC           Bridges c TA iso  pain  15x  c iso TA x 15   TA 5" x  10 TA 5"  X 15            Rollouts with Pball in sitting F-R-L  10x5"  5" x 10  10" x 10 ea  10" x 10 ea  10" x  10 ea  10"x10  ea  10" x 5 ea    10" x 10 ea      Prayer stretch F-R-L      30" x 3 ea  20" x 5  PAIN after 5 reps  PAIN  seated on stool  3 x 30" ea  seated on stool 3 x 30"       /2 prone multifidi                10 ea c 5" hold  10 ea c 5" hold    multifidus walkouts                OTB x 3 ea  OTB x 5 ea                                                                                                                                                                                                                                                           Modalities   5/4 5/8/18 5/11 5/18 5/22 5/29 6/1         MH + Estim  10'  10'  10'  10'  10' 10'  10'                                                                        Assessment: Tolerated treatment well  Patient required verbal and visual cueing for proper performance of TE and NM reeducation  Pt reports decrease in sx post treatment  Plan: Progress treatment as tolerated

## 2018-06-15 ENCOUNTER — OFFICE VISIT (OUTPATIENT)
Dept: PHYSICAL THERAPY | Facility: CLINIC | Age: 48
End: 2018-06-15
Payer: COMMERCIAL

## 2018-06-15 DIAGNOSIS — R53.1 WEAKNESS OF LEFT SIDE OF BODY: Primary | ICD-10-CM

## 2018-06-15 DIAGNOSIS — M54.50 ACUTE BILATERAL LOW BACK PAIN WITHOUT SCIATICA: ICD-10-CM

## 2018-06-15 DIAGNOSIS — G89.29 CHRONIC LOW BACK PAIN, UNSPECIFIED BACK PAIN LATERALITY, WITH SCIATICA PRESENCE UNSPECIFIED: ICD-10-CM

## 2018-06-15 DIAGNOSIS — M54.5 CHRONIC LOW BACK PAIN, UNSPECIFIED BACK PAIN LATERALITY, WITH SCIATICA PRESENCE UNSPECIFIED: ICD-10-CM

## 2018-06-15 PROCEDURE — 97110 THERAPEUTIC EXERCISES: CPT | Performed by: PHYSICAL MEDICINE & REHABILITATION

## 2018-06-15 PROCEDURE — 97140 MANUAL THERAPY 1/> REGIONS: CPT | Performed by: PHYSICAL MEDICINE & REHABILITATION

## 2018-06-15 PROCEDURE — 97112 NEUROMUSCULAR REEDUCATION: CPT | Performed by: PHYSICAL MEDICINE & REHABILITATION

## 2018-06-15 PROCEDURE — 97010 HOT OR COLD PACKS THERAPY: CPT | Performed by: PHYSICAL MEDICINE & REHABILITATION

## 2018-06-15 NOTE — PROGRESS NOTES
Daily Note     Today's date: 6/15/2018  Patient name: Nilesh Martell  : 1970  MRN: [id number]  Referring provider: Ayesha Harada, PA-C  Dx:   Encounter Diagnosis     ICD-10-CM    1  Weakness of left side of body R53 1    2  Acute bilateral low back pain without sciatica M54 5    3  Chronic low back pain, unspecified back pain laterality, with sciatica presence unspecified M54 5     G89 29        Start Time: 730  Stop Time: 825  Total time in clinic (min): 55 minutes    Subjective: Pt reports for tx however his appointment was scheduled for yesterday, we were able to tx today  He states that he is feeling better since last tx       Objective: See treatment diary below  Precautions: TBI w/ L sided weakness, DM, increased BMI, HTN       Daily Treatment Diary   MHP->STM->Manual->TE->Modalities  Manual   18   IASTM  KK  PK                   NICKY cervantes  np  np                   Lumbar gapping Gr II-III  np  np    NC  NC        NC  Gr V  NC   Gr V    T/S PA                  NC  Gr V      Seated paraspinal elongation c movement                   [facility]         Exercise Diary   18     MHP warm up                  10'     R bike  5 min  5 min  6 min  6 min  8 min  8 min  8'  5'       TM               2' pain       SKC  5x5"  5" x 5  Pain    5"x5  5'x5  20" x 5         LTR  10x5"  5" x 10  5" x 10  10" x 5 ea  10"x5  10"x5  5" x 5 ea         HS stretch seated     5 x 20"  5 x 20"  5x20"  5x20"  3 x 30"   3 x 30"  3 x 30"     HS strap str DC   30"x3  30" x 3  DC    DC DC           Sup TA activ   10x5"  5" x 10  DC    DC  DC           Bridges c TA iso  pain  15x  c iso TA x 15   TA 5" x  10 TA 5"  X 15            Rollouts with Pball in sitting F-R-L  10x5"  5" x 10  10" x 10 ea  10" x 10 ea  10" x  10 ea  10"x10  ea  10" x 5 ea    10" x 10 ea  10" x 10 ea    Prayer stretch F-R-L      30" x 3 ea  20" x 5  PAIN after 5 reps  PAIN  seated on stool  3 x 30" ea  seated on stool 3 x 30"       1/2 prone multifidi  Progressed DC                10 ea c 5" hold  10 ea c 5" hold  DC    multifidus walkouts                OTB x 3 ea  OTB x 5 ea      prone swimmers                    10 ea c 5" hold                                                                                                                                                                                                                                 Modalities   5/4 5/8/18 5/11 5/18 5/22 5/29 6/1         MH + Estim  10'  10'  10'  10'  10' 10'  10'                                                                  Assessment: Tolerated treatment well  Patient exhibited good technique with therapeutic exercises, would benefit from continued PT and reports 0/10 pain post manual tx  Plan: Progress treatment as tolerated

## 2018-06-19 ENCOUNTER — APPOINTMENT (OUTPATIENT)
Dept: PHYSICAL THERAPY | Facility: CLINIC | Age: 48
End: 2018-06-19
Payer: COMMERCIAL

## 2018-06-21 ENCOUNTER — OFFICE VISIT (OUTPATIENT)
Dept: PHYSICAL THERAPY | Facility: CLINIC | Age: 48
End: 2018-06-21
Payer: COMMERCIAL

## 2018-06-21 DIAGNOSIS — R53.1 WEAKNESS OF LEFT SIDE OF BODY: Primary | ICD-10-CM

## 2018-06-21 DIAGNOSIS — M54.50 ACUTE BILATERAL LOW BACK PAIN WITHOUT SCIATICA: ICD-10-CM

## 2018-06-21 DIAGNOSIS — G89.29 CHRONIC LOW BACK PAIN, UNSPECIFIED BACK PAIN LATERALITY, WITH SCIATICA PRESENCE UNSPECIFIED: ICD-10-CM

## 2018-06-21 DIAGNOSIS — M54.5 CHRONIC LOW BACK PAIN, UNSPECIFIED BACK PAIN LATERALITY, WITH SCIATICA PRESENCE UNSPECIFIED: ICD-10-CM

## 2018-06-21 PROCEDURE — 97112 NEUROMUSCULAR REEDUCATION: CPT | Performed by: PHYSICAL MEDICINE & REHABILITATION

## 2018-06-21 PROCEDURE — 97140 MANUAL THERAPY 1/> REGIONS: CPT | Performed by: PHYSICAL MEDICINE & REHABILITATION

## 2018-06-21 PROCEDURE — 97110 THERAPEUTIC EXERCISES: CPT | Performed by: PHYSICAL MEDICINE & REHABILITATION

## 2018-06-21 NOTE — PROGRESS NOTES
Daily Note     Today's date: 2018  Patient name: Henrry Sexton  : 1970  MRN: [id number]  Referring provider: Nick Riley PA-C  Dx:   Encounter Diagnosis     ICD-10-CM    1  Weakness of left side of body R53 1    2  Acute bilateral low back pain without sciatica M54 5    3  Chronic low back pain, unspecified back pain laterality, with sciatica presence unspecified M54 5     G89 29        Start Time: 730  Stop Time: 823  Total time in clinic (min): 53 minutes    Subjective: Pt reports 5/10 pain currently  Reduction in sx post tx         Objective: See treatment diary below  Precautions: TBI w/ L sided weakness, DM, increased BMI, HTN       Daily Treatment Diary   MHP->STM->Manual->TE->Modalities  Manual   18   IASTM   PK                   PA glides   np                   Lumbar gapping mobs    NC  Gr V  np    NC  NC        NC  Gr V  NC   Gr V    T/S PA  NC  Gr V                NC  Gr V      Seated paraspinal elongation c movement  [facility]         Exercise Diary   18   MHP warm up                  10'  10'   R bike  5 min  5 min  6 min  6 min  8 min  8 min  8'  5'       TM               2' pain       SKC  5x5"  5" x 5  Pain    5"x5  5'x5  20" x 5         LTR  10x5"  5" x 10  5" x 10  10" x 5 ea  10"x5  10"x5  5" x 5 ea         HS stretch seated     5 x 20"  5 x 20"  5x20"  5x20"  3 x 30"   3 x 30"  3 x 30"     HS strap str DC   30"x3  30" x 3  DC    DC DC           Sup TA activ   10x5"  5" x 10  DC    DC  DC           Bridges c TA iso  pain  15x  c iso TA x 15   TA 5" x  10 TA 5"  X 15            Rollouts with Pball in sitting F-R-L  10x5"  5" x 10  10" x 10 ea  10" x 10 ea  10" x  10 ea  10"x10  ea  10" x 5 ea    10" x 10 ea  10" x 10 ea    Prayer stretch F-R-L      30" x 3 ea  20" x 5  PAIN after 5 reps  PAIN  seated on stool  3 x 30" ea  seated on stool 3 x 30"       1/2 prone multifidi  Progressed DC                10 ea c 5" hold  10 ea c 5" hold  DC    multifidus walkouts                OTB x 3 ea  OTB x 5 ea  GTB  X 5 laps ea    prone swimmers                    10 ea c 5" hold                                                                                                                                                                                                                                 Modalities   5/4 5/8/18 5/11 5/18 5/22 5/29 6/1         MH + Estim  10'  10'  10'  10'  10' 10'  10'                                                                     Assessment: Tolerated treatment well  Patient demonstrated fatigue post treatment and would benefit from continued PT      Plan: Progress treatment as tolerated

## 2018-06-26 ENCOUNTER — OFFICE VISIT (OUTPATIENT)
Dept: PHYSICAL THERAPY | Facility: CLINIC | Age: 48
End: 2018-06-26
Payer: COMMERCIAL

## 2018-06-26 DIAGNOSIS — R53.1 WEAKNESS OF LEFT SIDE OF BODY: Primary | ICD-10-CM

## 2018-06-26 DIAGNOSIS — M54.50 ACUTE BILATERAL LOW BACK PAIN WITHOUT SCIATICA: ICD-10-CM

## 2018-06-26 DIAGNOSIS — G89.29 CHRONIC LOW BACK PAIN, UNSPECIFIED BACK PAIN LATERALITY, WITH SCIATICA PRESENCE UNSPECIFIED: ICD-10-CM

## 2018-06-26 DIAGNOSIS — M54.5 CHRONIC LOW BACK PAIN, UNSPECIFIED BACK PAIN LATERALITY, WITH SCIATICA PRESENCE UNSPECIFIED: ICD-10-CM

## 2018-06-26 PROCEDURE — 97112 NEUROMUSCULAR REEDUCATION: CPT | Performed by: PHYSICAL MEDICINE & REHABILITATION

## 2018-06-26 PROCEDURE — 97110 THERAPEUTIC EXERCISES: CPT | Performed by: PHYSICAL MEDICINE & REHABILITATION

## 2018-06-26 PROCEDURE — 97140 MANUAL THERAPY 1/> REGIONS: CPT | Performed by: PHYSICAL MEDICINE & REHABILITATION

## 2018-06-26 NOTE — PROGRESS NOTES
Daily Note     Today's date: 2018  Patient name: Vicky Dumont  : 1970  MRN: [id number]  Referring provider: Trang Concepcion PA-C  Dx:   Encounter Diagnosis     ICD-10-CM    1  Weakness of left side of body R53 1    2  Acute bilateral low back pain without sciatica M54 5    3  Chronic low back pain, unspecified back pain laterality, with sciatica presence unspecified M54 5     G89 29        Start Time: 730  Stop Time: 810  Total time in clinic (min): 40 minutes    Subjective: Pt reports that he is feeling good today, and that he felt much better after last treatment        Objective: See treatment diary below  Daily Treatment Diary   MHP->STM->Manual->TE->Modalities  Manual      IASTM    NC                                          Lumbar gapping mobs     NC  Gr V NC    NC  NC        NC  Gr V  NC   Gr V    T/S PA  NC  Gr V  NC              NC  Gr V      Seated paraspinal elongation c movement  NC                 [facility]         Exercise Diary  18   MHP warm up 10'                10'  10'   R bike   5 min  6 min  6 min  8 min  8 min  8'  5'       TM              2' pain       SKC   5" x 5  Pain    5"x5  5'x5  20" x 5         LTR   5" x 10  5" x 10  10" x 5 ea  10"x5  10"x5  5" x 5 ea         HS stretch seated    5 x 20"  5 x 20"  5x20"  5x20"  3 x 30"   3 x 30"  3 x 30"     HS strap str DC    30" x 3  DC    DC DC           Sup TA activ    5" x 10  DC    DC  DC           Bridges c TA iso   15x  c iso TA x 15   TA 5" x  10 TA 5"  X 15            Rollouts with Pball in sitting F-R-L 10" x 10  5" x 10  10" x 10 ea  10" x 10 ea  10" x  10 ea  10"x10  ea  10" x 5 ea    10" x 10 ea  10" x 10 ea    Prayer stretch F-R-L     30" x 3 ea  20" x 5  PAIN after 5 reps  PAIN  seated on stool  3 x 30" ea  seated on stool 3 x 30"       1/2 prone multifidi  Progressed DC               10 ea c 5" hold  10 ea c 5" hold  DC    multifidus walkouts               OTB x 3 ea  OTB x 5 ea  GTB  X 5 laps ea    prone swimmers 10 ea c 5" hold                  10 ea c 5" hold                                                                                                                                                                                                                        Modalities   5/4 5/8/18 5/11 5/18 5/22 5/29 6/1         MH + Estim  10'  10'  10'  10'  10' 10'  10'                                                                     Assessment: Tolerated treatment well  Patient continues to demonstrate improved quality and quantity of movement post manual tx  Challenged by spinal stabilization exercises  Plan: Progress treatment as tolerated

## 2018-06-28 ENCOUNTER — OFFICE VISIT (OUTPATIENT)
Dept: PHYSICAL THERAPY | Facility: CLINIC | Age: 48
End: 2018-06-28
Payer: COMMERCIAL

## 2018-06-28 DIAGNOSIS — M54.5 CHRONIC LOW BACK PAIN, UNSPECIFIED BACK PAIN LATERALITY, WITH SCIATICA PRESENCE UNSPECIFIED: ICD-10-CM

## 2018-06-28 DIAGNOSIS — G89.29 CHRONIC LOW BACK PAIN, UNSPECIFIED BACK PAIN LATERALITY, WITH SCIATICA PRESENCE UNSPECIFIED: ICD-10-CM

## 2018-06-28 DIAGNOSIS — M54.50 ACUTE BILATERAL LOW BACK PAIN WITHOUT SCIATICA: ICD-10-CM

## 2018-06-28 DIAGNOSIS — R53.1 WEAKNESS OF LEFT SIDE OF BODY: Primary | ICD-10-CM

## 2018-06-28 PROCEDURE — 97110 THERAPEUTIC EXERCISES: CPT | Performed by: PHYSICAL MEDICINE & REHABILITATION

## 2018-06-28 PROCEDURE — 97010 HOT OR COLD PACKS THERAPY: CPT | Performed by: PHYSICAL MEDICINE & REHABILITATION

## 2018-06-28 PROCEDURE — 97140 MANUAL THERAPY 1/> REGIONS: CPT | Performed by: PHYSICAL MEDICINE & REHABILITATION

## 2018-06-28 NOTE — PROGRESS NOTES
Daily Note     Today's date: 2018  Patient name: Kaye Carmichael  : 1970  MRN: [id number]  Referring provider: Maulik Richey PA-C  Dx:   Encounter Diagnosis     ICD-10-CM    1  Weakness of left side of body R53 1    2  Acute bilateral low back pain without sciatica M54 5    3  Chronic low back pain, unspecified back pain laterality, with sciatica presence unspecified M54 5     G89 29        Start Time: 730  Stop Time: 830  Total time in clinic (min): 60 minutes    Subjective: Pt reports that he is experiencing a little pain currently  Reports reduction in sx post tx           Objective: See treatment diary below  Precautions: TBI w/ L sided weakness, DM, increased BMI, HTN       MHP->STM->Manual->TE->Modalities  Manual      IASTM    NC  NC                                         Lumbar gapping mobs     NC  Gr V NC  NC  NC  NC        NC  Gr V  NC   Gr V    T/S PA  NC  Gr V  NC  NC  Gr V            NC  Gr V      Seated paraspinal elongation c movement  [facility]         Exercise Diary     MHP warm up 10'  10'              10'  10'   R bike      6 min  6 min  8 min  8 min  8'  5'       TM               2' pain       SKC      Pain    5"x5  5'x5  20" x 5         LTR    10 ea c 5" hold  5" x 10  10" x 5 ea  10"x5  10"x5  5" x 5 ea         HS stretch seated     5 x 20"  5 x 20"  5x20"  5x20"  3 x 30"   3 x 30"  3 x 30"     HS strap str DC      DC    DC DC           Sup TA activ       DC    DC  DC           Bridges c TA iso      c iso TA x 15   TA 5" x  10 TA 5"  X 15            Rollouts with Pball in sitting F-R-L 10" x 10  10" x 10  10" x 10 ea  10" x 10 ea  10" x  10 ea  10"x10  ea  10" x 5 ea    10" x 10 ea  10" x 10 ea    Prayer stretch F-R-L      30" x 3 ea  20" x 5  PAIN after 5 reps  PAIN  seated on stool  3 x 30" ea  seated on stool 3 x 30"       1/2 prone multifidi  Progressed DC                10 ea c 5" hold  10 ea c 5" hold  DC    multifidus walkouts   3 laps ea c 5" hold            OTB x 3 ea  OTB x 5 ea  GTB  X 5 laps ea    prone swimmers 10 ea c 5" hold 15 ea c 5" hold                10 ea c 5" hold                                                                                                                                                                                                                                 Modalities   5/4 5/8/18 5/11 5/18 5/22 5/29 6/1         MH + Estim  10'  10'  10'  10'  10' 10'  10'                                                                        Assessment: Tolerated treatment well  Patient exhibited good technique with therapeutic exercises      Plan: Progress treatment as tolerated  Pt scheduled for 2 more visits, plan to D/C after second visit

## 2018-07-05 PROCEDURE — G8991 OTHER PT/OT GOAL STATUS: HCPCS | Performed by: PHYSICAL MEDICINE & REHABILITATION

## 2018-07-05 PROCEDURE — G8992 OTHER PT/OT  D/C STATUS: HCPCS | Performed by: PHYSICAL MEDICINE & REHABILITATION

## 2018-07-05 NOTE — PROGRESS NOTES
Pt has no showed to 2 consecutive appointments  Pt has not indicated intention to return to therapy  Pt will be discharged per attendance policy at this time

## 2018-07-27 ENCOUNTER — OFFICE VISIT (OUTPATIENT)
Dept: FAMILY MEDICINE CLINIC | Facility: CLINIC | Age: 48
End: 2018-07-27
Payer: COMMERCIAL

## 2018-07-27 VITALS
DIASTOLIC BLOOD PRESSURE: 76 MMHG | HEART RATE: 86 BPM | SYSTOLIC BLOOD PRESSURE: 132 MMHG | RESPIRATION RATE: 18 BRPM | TEMPERATURE: 95.8 F | OXYGEN SATURATION: 96 % | BODY MASS INDEX: 40.18 KG/M2 | WEIGHT: 250 LBS | HEIGHT: 66 IN

## 2018-07-27 DIAGNOSIS — E11.9 CONTROLLED TYPE 2 DIABETES MELLITUS WITHOUT COMPLICATION, WITHOUT LONG-TERM CURRENT USE OF INSULIN (HCC): ICD-10-CM

## 2018-07-27 DIAGNOSIS — M54.16 LUMBAR RADICULOPATHY: Primary | ICD-10-CM

## 2018-07-27 DIAGNOSIS — G56.02 CARPAL TUNNEL SYNDROME ON LEFT: ICD-10-CM

## 2018-07-27 DIAGNOSIS — R29.898 WEAKNESS OF LEFT LOWER EXTREMITY: ICD-10-CM

## 2018-07-27 LAB — SL AMB POCT HEMOGLOBIN AIC: 7

## 2018-07-27 PROCEDURE — 99214 OFFICE O/P EST MOD 30 MIN: CPT | Performed by: PHYSICIAN ASSISTANT

## 2018-07-27 PROCEDURE — 83036 HEMOGLOBIN GLYCOSYLATED A1C: CPT | Performed by: PHYSICIAN ASSISTANT

## 2018-07-27 RX ORDER — LANCETS 28 GAUGE
EACH MISCELLANEOUS
Qty: 100 EACH | Refills: 0 | Status: SHIPPED | OUTPATIENT
Start: 2018-07-27 | End: 2018-08-29 | Stop reason: SDUPTHER

## 2018-07-27 RX ORDER — BLOOD-GLUCOSE METER
KIT MISCELLANEOUS
Qty: 1 EACH | Refills: 0 | Status: SHIPPED | OUTPATIENT
Start: 2018-07-27 | End: 2018-08-29 | Stop reason: SDUPTHER

## 2018-07-27 NOTE — PROGRESS NOTES
Assessment/Plan:    Chronic back pain  With little improvement of back pain with conservative measures following 6 weeks of physical therapy, and noticeable difference in strength and left lower extremity when compared to right, will order an MRI of lumbar spine for further evaluation  Pending results may refer to Neurosurgery, or pain management  Diabetes mellitus type II, controlled ([facility] )  Lab Results   Component Value Date    HGBA1C 7 0 07/27/2018       No results for input(s): POCGLU in the last 72 hours  Blood Sugar Average: Last 72 hrs:   A1c today is 7 0%  This is up slightly from 6 9 the beginning of the year  At this time will increase the metformin to 1000 mg daily  Send over refills for diabetic supplies  Discussed the importance of diet and exercise to lower A1c  Carpal tunnel syndrome on left  Since he was previously given a splint for his left hand symptoms, believe that weakness in his left hand may be due to carpal tunnel  Will order an EMG for further evaluation  Pending results may refer to PT or Ortho  Diagnoses and all orders for this visit:    Lumbar radiculopathy  -     XR spine lumbar minimum 4 views non injury; Future  -     MRI lumbar spine wo contrast; Future  -     Ambulatory referral to Physical Therapy; Future  -     Cane    Weakness of left lower extremity  -     XR spine lumbar minimum 4 views non injury; Future  -     MRI lumbar spine wo contrast; Future  -     Ambulatory referral to Physical Therapy; Future  -     Cane    Carpal tunnel syndrome on left  -     EMG 2 Limb Upper Extremity; Future    Controlled type 2 diabetes mellitus without complication, without long-term current use of insulin ([facility])  -     Lipid panel; Future  -     Blood Glucose Monitoring Suppl (FREESTYLE LITE) CHARAN; Used device to check blood sugar once daily   -     glucose blood (FREESTYLE LITE) test strip; Use trip to test blood sugar daily    -     metFORMIN (GLUCOPHAGE) 1000 MG tablet; Take 1 tablet (1,000 mg total) by mouth daily with breakfast  -     Lancets (FREESTYLE) lancets; Use lancets to check blood sugar daily   -     POCT hemoglobin A1c          Subjective:      Patient ID: Yazmin Pittman is a 52 y o  male  41-year-old male presenting for routine follow-up of low back pain  Patient states that he has just finished 6 weeks of physical therapy, and is continuing to have pain and low back  States that at times he will have radicular symptoms on the left leg  Is also experiencing weakness in left leg  Is also concerned with unsteady gait at times  Pain medications have been providing some relief pain  Patient is also concerned with weakness in his left hand  States that he has been dropping more things when he swelling on left hand recently  States when he is holding something and he can feel it slipped out of his hand  Has a weak grasp  At times will have pain in his left wrist   States he went to the emergency room previously for this, and they gave him a splint  Has never been tested for carpal tunnel before  Denies any left upper extremity pain other than the rest, or any neck pain  Patient is currently a diabetic  Is requesting a new meter since his has broken  Was previously seeing Dr Mae Mccallum, but would like to be seen in this office  Is currently on metformin 500 mg daily  A1c at the beginning of the year was 6 9%  States that his fasting blood sugar is typically around 140  Tries to eat a healthy diet  Does limited physical activity, secondary data back pain and leg weakness  The following portions of the patient's history were reviewed and updated as appropriate:   He  has a past medical history of Diabetes mellitus ([address] ); Hyperlipidemia; Hypertension; and Psychosis    He   Patient Active Problem List    Diagnosis Date Noted    Carpal tunnel syndrome on left 07/27/2018    Benign prostatic hyperplasia with urinary frequency 02/02/2018    Chronic back pain 01/04/2018    Diabetes mellitus type II, controlled ([facility] ) 10/17/2017    Vitamin D deficiency 10/17/2017    Concussion with no loss of consciousness 10/13/2017    Visual disturbances 10/13/2017    Insomnia 01/20/2016    Psychosis 01/20/2016    TBI (traumatic brain injury) ([facility] ) 12/08/2015    Weakness of left side of body 12/08/2015     He  has a past surgical history that includes Elevation of depressed skull fracture  His family history includes Diabetes in his father  He  reports that he has never smoked  He has never used smokeless tobacco  He reports that he does not drink alcohol or use drugs  Current Outpatient Prescriptions   Medication Sig Dispense Refill    atorvastatin (LIPITOR) 10 mg tablet Take 1 tablet by mouth daily      Blood Glucose Monitoring Suppl (FREESTYLE LITE) CHARAN Used device to check blood sugar once daily  1 each 0    divalproex sodium (DEPAKOTE ER) 500 mg 24 hr tablet Take 1,000 mg by mouth daily at bedtime  1    ergocalciferol (VITAMIN D2) 50,000 units Take 1 capsule by mouth once a week      glucose blood (FREESTYLE LITE) test strip Use trip to test blood sugar daily  100 each 0    Lancets (FREESTYLE) lancets Use lancets to check blood sugar daily  100 each 0    lisinopril (ZESTRIL) 5 mg tablet Take 1 tablet by mouth daily      magnesium oxide (MAG-OX) 400 mg tablet Take 1 tablet by mouth daily      metFORMIN (GLUCOPHAGE) 1000 MG tablet Take 1 tablet (1,000 mg total) by mouth daily with breakfast 90 tablet 0    methocarbamol (ROBAXIN) 500 mg tablet Take 1 tablet by mouth 3 (three) times a day      naproxen (NAPROSYN) 500 mg tablet TAKE 1 TABLET EVERY 12 HOURS WITH FOOD AS NEEDED  2    QUEtiapine (SEROquel) 200 mg tablet Take 200 mg by mouth daily at bedtime  1    tamsulosin (FLOMAX) 0 4 mg Take 1 capsule (0 4 mg total) by mouth daily with dinner 90 capsule 1     No current facility-administered medications for this visit        He has No Known Allergies       Review of Systems   Constitutional: Negative for activity change, appetite change, chills, fatigue, fever and unexpected weight change  Eyes: Negative for pain and visual disturbance  Respiratory: Negative for chest tightness, shortness of breath and wheezing  Cardiovascular: Negative for chest pain, palpitations and leg swelling  Gastrointestinal: Negative for abdominal pain, diarrhea, nausea and vomiting  Endocrine: Negative for polydipsia, polyphagia and polyuria  Genitourinary: Negative for frequency and urgency  Musculoskeletal: Positive for back pain  Negative for arthralgias, neck pain and neck stiffness  Skin: Negative for rash and wound  Neurological: Positive for weakness and numbness  Negative for dizziness, syncope and headaches  Psychiatric/Behavioral: Negative for dysphoric mood and sleep disturbance  The patient is not nervous/anxious  Objective:      /76 (BP Location: Left arm, Patient Position: Sitting, Cuff Size: Large)   Pulse 86   Temp (!) 95 8 °F (35 4 °C) (Tympanic)   Resp 18   Ht 5' 6" (1 676 m)   Wt 113 kg (250 lb)   SpO2 96%   BMI 40 35 kg/m²          Physical Exam   Constitutional: He is oriented to person, place, and time  He appears well-developed and well-nourished  HENT:   Right Ear: Tympanic membrane, external ear and ear canal normal    Left Ear: Tympanic membrane, external ear and ear canal normal    Nose: Nose normal    Mouth/Throat: Oropharynx is clear and moist and mucous membranes are normal  No oropharyngeal exudate  Eyes: Conjunctivae and EOM are normal  Pupils are equal, round, and reactive to light  Neck: Normal range of motion  Neck supple  Cardiovascular: Normal rate, regular rhythm, normal heart sounds and normal pulses  Exam reveals no gallop and no friction rub  No murmur heard  Pulmonary/Chest: Effort normal and breath sounds normal  No respiratory distress  He has no wheezes  He has no rales  Abdominal: Soft  Bowel sounds are normal  He exhibits no distension and no mass  There is no tenderness  There is no rebound and no guarding  Musculoskeletal: Normal range of motion  He exhibits no edema  Left wrist: He exhibits tenderness  Left hip: He exhibits decreased strength  Lumbar back: He exhibits bony tenderness and spasm  He exhibits normal range of motion  Left hand: Decreased strength noted  Lymphadenopathy:     He has no cervical adenopathy  Neurological: He is alert and oriented to person, place, and time  He has normal reflexes  No cranial nerve deficit or sensory deficit  Coordination and gait normal    Decreased strength in left lower extremity when compared to right  Decreased strength in left  when compared to right  Skin: Skin is warm and dry  No rash noted  Psychiatric: He has a normal mood and affect  His behavior is normal  Thought content normal    Nursing note and vitals reviewed

## 2018-07-27 NOTE — ASSESSMENT & PLAN NOTE
Since he was previously given a splint for his left hand symptoms, believe that weakness in his left hand may be due to carpal tunnel  Will order an EMG for further evaluation  Pending results may refer to PT or Ortho

## 2018-07-27 NOTE — ASSESSMENT & PLAN NOTE
With little improvement of back pain with conservative measures following 6 weeks of physical therapy, and noticeable difference in strength and left lower extremity when compared to right, will order an MRI of lumbar spine for further evaluation  Pending results may refer to Neurosurgery, or pain management

## 2018-07-27 NOTE — ASSESSMENT & PLAN NOTE
Lab Results   Component Value Date    HGBA1C 7 0 07/27/2018       No results for input(s): POCGLU in the last 72 hours  Blood Sugar Average: Last 72 hrs:   A1c today is 7 0%  This is up slightly from 6 9 the beginning of the year  At this time will increase the metformin to 1000 mg daily  Send over refills for diabetic supplies  Discussed the importance of diet and exercise to lower A1c

## 2018-08-03 ENCOUNTER — APPOINTMENT (OUTPATIENT)
Dept: LAB | Facility: CLINIC | Age: 48
End: 2018-08-03
Payer: COMMERCIAL

## 2018-08-03 DIAGNOSIS — E11.9 CONTROLLED TYPE 2 DIABETES MELLITUS WITHOUT COMPLICATION, WITHOUT LONG-TERM CURRENT USE OF INSULIN (HCC): ICD-10-CM

## 2018-08-03 DIAGNOSIS — Z12.5 ENCOUNTER FOR PROSTATE CANCER SCREENING: ICD-10-CM

## 2018-08-03 LAB
ALBUMIN SERPL BCP-MCNC: 3.6 G/DL (ref 3.5–5)
ALP SERPL-CCNC: 86 U/L (ref 46–116)
ALT SERPL W P-5'-P-CCNC: 70 U/L (ref 12–78)
ANION GAP SERPL CALCULATED.3IONS-SCNC: 5 MMOL/L (ref 4–13)
AST SERPL W P-5'-P-CCNC: 25 U/L (ref 5–45)
BASOPHILS # BLD AUTO: 0.05 THOUSANDS/ΜL (ref 0–0.1)
BASOPHILS NFR BLD AUTO: 1 % (ref 0–1)
BILIRUB SERPL-MCNC: 0.43 MG/DL (ref 0.2–1)
BUN SERPL-MCNC: 16 MG/DL (ref 5–25)
CALCIUM SERPL-MCNC: 8.5 MG/DL (ref 8.3–10.1)
CHLORIDE SERPL-SCNC: 106 MMOL/L (ref 100–108)
CHOLEST SERPL-MCNC: 184 MG/DL (ref 50–200)
CO2 SERPL-SCNC: 29 MMOL/L (ref 21–32)
CREAT SERPL-MCNC: 1.13 MG/DL (ref 0.6–1.3)
CREAT UR-MCNC: 152 MG/DL
EOSINOPHIL # BLD AUTO: 0.45 THOUSAND/ΜL (ref 0–0.61)
EOSINOPHIL NFR BLD AUTO: 7 % (ref 0–6)
ERYTHROCYTE [DISTWIDTH] IN BLOOD BY AUTOMATED COUNT: 14.2 % (ref 11.6–15.1)
EST. AVERAGE GLUCOSE BLD GHB EST-MCNC: 163 MG/DL
GFR SERPL CREATININE-BSD FRML MDRD: 77 ML/MIN/1.73SQ M
GLUCOSE P FAST SERPL-MCNC: 152 MG/DL (ref 65–99)
HBA1C MFR BLD: 7.3 % (ref 4.2–6.3)
HCT VFR BLD AUTO: 43.9 % (ref 36.5–49.3)
HDLC SERPL-MCNC: 36 MG/DL (ref 40–60)
HGB BLD-MCNC: 13.4 G/DL (ref 12–17)
IMM GRANULOCYTES # BLD AUTO: 0.02 THOUSAND/UL (ref 0–0.2)
IMM GRANULOCYTES NFR BLD AUTO: 0 % (ref 0–2)
LDLC SERPL CALC-MCNC: 124 MG/DL (ref 0–100)
LYMPHOCYTES # BLD AUTO: 2.21 THOUSANDS/ΜL (ref 0.6–4.47)
LYMPHOCYTES NFR BLD AUTO: 33 % (ref 14–44)
MCH RBC QN AUTO: 26 PG (ref 26.8–34.3)
MCHC RBC AUTO-ENTMCNC: 30.5 G/DL (ref 31.4–37.4)
MCV RBC AUTO: 85 FL (ref 82–98)
MICROALBUMIN UR-MCNC: 21.6 MG/L (ref 0–20)
MICROALBUMIN/CREAT 24H UR: 14 MG/G CREATININE (ref 0–30)
MONOCYTES # BLD AUTO: 0.57 THOUSAND/ΜL (ref 0.17–1.22)
MONOCYTES NFR BLD AUTO: 9 % (ref 4–12)
NEUTROPHILS # BLD AUTO: 3.33 THOUSANDS/ΜL (ref 1.85–7.62)
NEUTS SEG NFR BLD AUTO: 50 % (ref 43–75)
NONHDLC SERPL-MCNC: 148 MG/DL
NRBC BLD AUTO-RTO: 0 /100 WBCS
PLATELET # BLD AUTO: 254 THOUSANDS/UL (ref 149–390)
PMV BLD AUTO: 10.2 FL (ref 8.9–12.7)
POTASSIUM SERPL-SCNC: 3.8 MMOL/L (ref 3.5–5.3)
PROT SERPL-MCNC: 7.6 G/DL (ref 6.4–8.2)
PSA SERPL-MCNC: 0.6 NG/ML (ref 0–4)
RBC # BLD AUTO: 5.16 MILLION/UL (ref 3.88–5.62)
SODIUM SERPL-SCNC: 140 MMOL/L (ref 136–145)
TRIGL SERPL-MCNC: 118 MG/DL
WBC # BLD AUTO: 6.63 THOUSAND/UL (ref 4.31–10.16)

## 2018-08-03 PROCEDURE — 84153 ASSAY OF PSA TOTAL: CPT

## 2018-08-03 PROCEDURE — 80053 COMPREHEN METABOLIC PANEL: CPT

## 2018-08-03 PROCEDURE — 85025 COMPLETE CBC W/AUTO DIFF WBC: CPT

## 2018-08-03 PROCEDURE — 3061F NEG MICROALBUMINURIA REV: CPT | Performed by: PHYSICIAN ASSISTANT

## 2018-08-03 PROCEDURE — 82570 ASSAY OF URINE CREATININE: CPT

## 2018-08-03 PROCEDURE — 82043 UR ALBUMIN QUANTITATIVE: CPT

## 2018-08-03 PROCEDURE — 83036 HEMOGLOBIN GLYCOSYLATED A1C: CPT

## 2018-08-03 PROCEDURE — 36415 COLL VENOUS BLD VENIPUNCTURE: CPT

## 2018-08-03 PROCEDURE — 80061 LIPID PANEL: CPT

## 2018-08-06 DIAGNOSIS — E78.5 HYPERLIPIDEMIA, UNSPECIFIED HYPERLIPIDEMIA TYPE: Primary | ICD-10-CM

## 2018-08-06 RX ORDER — ATORVASTATIN CALCIUM 10 MG/1
10 TABLET, FILM COATED ORAL DAILY
Qty: 90 TABLET | Refills: 1 | Status: SHIPPED | OUTPATIENT
Start: 2018-08-06 | End: 2019-02-13 | Stop reason: DRUGHIGH

## 2018-08-21 ENCOUNTER — EVALUATION (OUTPATIENT)
Dept: PHYSICAL THERAPY | Facility: CLINIC | Age: 48
End: 2018-08-21
Payer: COMMERCIAL

## 2018-08-21 DIAGNOSIS — R29.898 WEAKNESS OF LEFT LOWER EXTREMITY: ICD-10-CM

## 2018-08-21 DIAGNOSIS — M54.16 LUMBAR RADICULOPATHY: ICD-10-CM

## 2018-08-21 PROCEDURE — G8994 SUB PT/OT GOAL STATUS: HCPCS

## 2018-08-21 PROCEDURE — 97161 PT EVAL LOW COMPLEX 20 MIN: CPT

## 2018-08-21 PROCEDURE — 97110 THERAPEUTIC EXERCISES: CPT

## 2018-08-21 PROCEDURE — G8993 SUB PT/OT CURRENT STATUS: HCPCS
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
## 2018-08-28 ENCOUNTER — OFFICE VISIT (OUTPATIENT)
Dept: PHYSICAL THERAPY | Facility: CLINIC | Age: 48
End: 2018-08-28
Payer: COMMERCIAL

## 2018-08-28 DIAGNOSIS — R29.898 WEAKNESS OF LEFT LOWER EXTREMITY: ICD-10-CM

## 2018-08-28 DIAGNOSIS — M54.16 LUMBAR RADICULOPATHY: Primary | ICD-10-CM

## 2018-08-28 PROCEDURE — 97010 HOT OR COLD PACKS THERAPY: CPT

## 2018-08-28 PROCEDURE — 97110 THERAPEUTIC EXERCISES: CPT

## 2018-08-28 NOTE — PROGRESS NOTES
Daily Note     Today's date: 2018  Patient name: Meg Villanueva  : 1970  MRN: [id number]  Referring provider: Mckay Patel PA-C  Dx:   Encounter Diagnosis     ICD-10-CM    1  Lumbar radiculopathy M54 16    2  Weakness of left lower extremity R29 893                   Subjective: Pt reports with c/o moderate left sided lower back pain graded 6/10  He noted pain He reported pain increases with prolonged sitting and standing  Objective: See treatment diary below  Precautions: depression    Daily Treatment Diary     Manual              ISTM  nv           Lumbar mobs  nv                                                      Exercise Diary  18           TA firing bridging 10 x  5"  x10           SKR 10x 5"            LTR 20 x 15"x5           SLR TA activation 10x 2 3"  x10           SKTC  15"x5           pball rollouts x3  15"x5                                                                                                                                                                                                     Modalities              MHP pre 10' prone                                          Assessment: Tolerated treatment well  Decreased pain post treatment  Patient exhibited good technique with therapeutic exercises and would benefit from continued PT  Issued/reviewed HEP this visit; no questions or concerns expressed  Plan: Continue per plan of care

## 2018-08-29 ENCOUNTER — HOSPITAL ENCOUNTER (EMERGENCY)
Facility: HOSPITAL | Age: 48
Discharge: HOME | End: 2018-08-29
Attending: EMERGENCY MEDICINE
Payer: COMMERCIAL

## 2018-08-29 ENCOUNTER — APPOINTMENT (EMERGENCY)
Dept: RADIOLOGY | Facility: HOSPITAL | Age: 48
End: 2018-08-29
Payer: COMMERCIAL

## 2018-08-29 ENCOUNTER — OFFICE VISIT (OUTPATIENT)
Dept: FAMILY MEDICINE CLINIC | Facility: CLINIC | Age: 48
End: 2018-08-29
Payer: COMMERCIAL

## 2018-08-29 VITALS
SYSTOLIC BLOOD PRESSURE: 176 MMHG | HEART RATE: 89 BPM | WEIGHT: 249.12 LBS | RESPIRATION RATE: 20 BRPM | OXYGEN SATURATION: 95 % | TEMPERATURE: 98.2 F | DIASTOLIC BLOOD PRESSURE: 105 MMHG | BODY MASS INDEX: 40.21 KG/M2

## 2018-08-29 VITALS
WEIGHT: 249 LBS | SYSTOLIC BLOOD PRESSURE: 120 MMHG | DIASTOLIC BLOOD PRESSURE: 80 MMHG | BODY MASS INDEX: 40.02 KG/M2 | HEART RATE: 78 BPM | OXYGEN SATURATION: 95 % | HEIGHT: 66 IN | TEMPERATURE: 97 F | RESPIRATION RATE: 20 BRPM

## 2018-08-29 DIAGNOSIS — M54.5 CHRONIC LOW BACK PAIN, UNSPECIFIED BACK PAIN LATERALITY, WITH SCIATICA PRESENCE UNSPECIFIED: ICD-10-CM

## 2018-08-29 DIAGNOSIS — S69.91XA INJURY OF RIGHT WRIST, INITIAL ENCOUNTER: ICD-10-CM

## 2018-08-29 DIAGNOSIS — E11.9 CONTROLLED TYPE 2 DIABETES MELLITUS WITHOUT COMPLICATION, WITHOUT LONG-TERM CURRENT USE OF INSULIN (HCC): ICD-10-CM

## 2018-08-29 DIAGNOSIS — Z00.00 MEDICARE ANNUAL WELLNESS VISIT, SUBSEQUENT: Primary | ICD-10-CM

## 2018-08-29 DIAGNOSIS — G89.29 CHRONIC LOW BACK PAIN, UNSPECIFIED BACK PAIN LATERALITY, WITH SCIATICA PRESENCE UNSPECIFIED: ICD-10-CM

## 2018-08-29 DIAGNOSIS — S89.91XA INJURY OF RIGHT KNEE, INITIAL ENCOUNTER: Primary | ICD-10-CM

## 2018-08-29 PROCEDURE — 73110 X-RAY EXAM OF WRIST: CPT

## 2018-08-29 PROCEDURE — 73564 X-RAY EXAM KNEE 4 OR MORE: CPT

## 2018-08-29 PROCEDURE — 99283 EMERGENCY DEPT VISIT LOW MDM: CPT

## 2018-08-29 PROCEDURE — G0439 PPPS, SUBSEQ VISIT: HCPCS | Performed by: PHYSICIAN ASSISTANT

## 2018-08-29 PROCEDURE — 3008F BODY MASS INDEX DOCD: CPT | Performed by: PHYSICIAN ASSISTANT

## 2018-08-29 PROCEDURE — 99213 OFFICE O/P EST LOW 20 MIN: CPT | Performed by: PHYSICIAN ASSISTANT

## 2018-08-29 RX ORDER — LANCETS 28 GAUGE
EACH MISCELLANEOUS
Qty: 100 EACH | Refills: 1 | Status: SHIPPED | OUTPATIENT
Start: 2018-08-29 | End: 2018-11-24 | Stop reason: SDUPTHER

## 2018-08-29 RX ORDER — BLOOD-GLUCOSE METER
KIT MISCELLANEOUS
Qty: 1 EACH | Refills: 0 | Status: SHIPPED | OUTPATIENT
Start: 2018-08-29 | End: 2018-09-25 | Stop reason: SDUPTHER

## 2018-08-29 RX ORDER — IBUPROFEN 400 MG/1
400 TABLET ORAL ONCE
Status: COMPLETED | OUTPATIENT
Start: 2018-08-29 | End: 2018-08-29

## 2018-08-29 RX ORDER — GABAPENTIN 100 MG/1
100 CAPSULE ORAL 3 TIMES DAILY
Qty: 90 CAPSULE | Refills: 1 | Status: SHIPPED | OUTPATIENT
Start: 2018-08-29 | End: 2018-11-13

## 2018-08-29 RX ADMIN — IBUPROFEN 400 MG: 400 TABLET, FILM COATED ORAL at 14:35

## 2018-08-29 NOTE — PROGRESS NOTES
Elyse Deras is here for his Initial Wellness visit  Last Medicare Wellness visit information reviewed, patient interviewed, no change since last AWV  Health Risk Assessment:  Patient rates overall health as poor  Patient feels that their physical health rating is Much worse  Eyesight was rated as Slightly worse  Hearing was rated as Same  Patient feels that their emotional and mental health rating is Slightly worse  Pain experienced by patient in the last 7 days has been A lot  Patient's pain rating has been 9/10  Patient states that he has experienced weight loss or gain in last 6 months  Emotional/Mental Health:  Patient has been feeling nervous/anxious  PHQ-9 Depression Screening:    Frequency of the following problems over the past two weeks:      1  Little interest or pleasure in doing things: 1 - several days      2  Feeling down, depressed, or hopeless: 1 - several days      3  Trouble falling or staying asleep, or sleeping too much: 0 - not at all      4  Feeling tired or having little energy: 0 - not at all      5  Poor appetite or overeatin - not at all      6  Feeling bad about yourself - or that you are a failure or have let yourself or your family down: 0 - not at all      7  Trouble concentrating on things, such as reading the newspaper or watching television: 0 - not at all      8  Moving or speaking so slowly that other people could have noticed  Or the opposite - being so fidgety or restless that you have been moving around a lot more than usual: 0 - not at all      9  Thoughts that you would be better off dead, or of hurting yourself in some way: 0 - not at all  PHQ-2 Score: 2  PHQ-9 Score: 2    Broken Bones/Falls: Fall Risk Assessment:    In the past year, patient has experienced: History of falling in past year     Number of falls: greater than 3  Patient feels unsteady standing  Patient is taking medication that can cause feelings of lightheadedness or tiredness   Patient often has a need to rush to the toilet  Chronic conditions that may contribute to falls diabetes and neuropathy  Bladder/Bowel:  Patient has not leaked urine accidently in the last six months  Patient reports no loss of bowel control  Immunizations:  Patient has not had a flu vaccination within the last year  Patient has not received a pneumonia shot  Patient has not received a shingles shot  Patient has not received tetanus/diphtheria shot  Home Safety:  Patient has trouble with stairs inside or outside of their home  Patient currently reports that there are no safety hazards present in home, working smoke alarms, no working carbon monoxide detectors  Preventative Screenings:   no prostate cancer screen performed, no colon cancer screen completed, cholesterol screen completed, no glaucoma eye exam completed    Nutrition:  Current diet: Diabetic, Low Cholesterol, Low Saturated Fat, Low Carb, Frequent junk food and No Added Salt with servings of the following:    Medications:  Patient is not currently taking any over-the-counter supplements  Patient is able to manage medications  Lifestyle Choices:  Patient reports no tobacco use  Patient has not smoked or used tobacco in the past   Patient reports no alcohol use  Patient does not drive a vehicle  Patient wears seat belt  Current level of exercise of physical activity described by patient as: States has back problem, goes to physical therapy once week, no additional exercise  Activities of Daily Living:  Can get out of bed by his or her self, able to dress self, able to make own meals, able to do own shopping, able to bathe self, unable to do laundry/housekeeping, unable to manage own money and other related tasksAdditional Comments: States his girlfriend manages laundry, housekeeping and bills      Previous Hospitalizations:  No hospitalization or ED visit in past 12 months        Advanced Directives:  Patient has not decided on power of   Patient has not completed advanced directive  Preventative Screening/Counseling:      Cardiovascular:      General: Risks and Benefits Discussed and Screening Current      Counseling: Healthy Diet, Healthy Weight, Improve Cholesterol and Improve Exercise Tolerance          Diabetes:      General: Risks and Benefits Discussed and Screening Current      Counseling: Healthy Diet, Healthy Weight and Improve Physical Activity          Colorectal Cancer:      General: Screening Not Indicated      Counseling: high fiber diet          Prostate Cancer:      General: Screening Not Indicated          Osteoporosis:      General: Screening Not Indicated          AAA:      General: Screening Not Indicated          Glaucoma:      General: Risks and Benefits Discussed      Referrals: Ophthalmology          HIV:      General: Patient Declines          Hepatitis C:      General: Patient Declines        Advanced Directives:   Patient has no living will for healthcare, does not have durable POA for healthcare, patient does not have an advanced directive  Immunizations:      Influenza: Influenza Recommended Annually      TDAP: Tdap Vaccine UTD      Other Preventative Counseling (Non-Medicare): Fall Prevention, Increase physical activity, Nutrition Counseling, Car/seat belt/driving safety reviewed, Skin self-exam and Weight reduction discussed      Referrals:  Referral(s) to: Neurology and Physical Therapy  Additional Comments: Discussed with patient's concerns of instability, weakness, headaches that it may be best for him to follow up with Neurology  Will wait for results of recent testing for any further recommendations  Continue to follow up with physical therapy
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
## 2018-08-29 NOTE — ED PROVIDER NOTES
History  Chief Complaint   Patient presents with    Knee Injury     Reports falling down steps onto R knee and R wrist  Has pain and swelling  19-year-old male presents the ER with right wrist and right knee pain that occurred today after he tripped and fell  Patient states that he was going down some steps when he slipped and fell approximately 2 steps onto his right knee and with his right wrist extended  Patient denies numbness, tingling, nausea, vomiting, denies loss of consciousness at time of fall or head injury  Patient denies radiation of pain up the arm or down to fingers and denies radiation of pain up or down right lower extremity  Patient has not taken anything for pain  Patient states that he does have a history of high blood pressure but he has not taken his medications today due to fall occurring prior to that time he takes his medications  Prior to Admission Medications   Prescriptions Last Dose Informant Patient Reported? Taking? Blood Glucose Monitoring Suppl (FREESTYLE LITE) CHARAN   No No   Sig: Used device to check blood sugar once daily  Lancets (FREESTYLE) lancets   No No   Sig: Use lancets to check blood sugar daily  QUEtiapine (SEROquel) 200 mg tablet   Yes No   Sig: Take 200 mg by mouth daily at bedtime   atorvastatin (LIPITOR) 10 mg tablet   No No   Sig: Take 1 tablet (10 mg total) by mouth daily   divalproex sodium (DEPAKOTE ER) 500 mg 24 hr tablet   Yes No   Sig: Take 1,000 mg by mouth daily at bedtime   ergocalciferol (VITAMIN D2) 50,000 units   Yes No   Sig: Take 1 capsule by mouth once a week   gabapentin (NEURONTIN) 100 mg capsule   No No   Sig: Take 1 capsule (100 mg total) by mouth 3 (three) times a day , increase by 1 capsule every week until taking 3 capsules 3 times a day   glucose blood (FREESTYLE LITE) test strip   No No   Sig: Use trip to test blood sugar daily     lisinopril (ZESTRIL) 5 mg tablet   Yes No   Sig: Take 1 tablet by mouth daily magnesium oxide (MAG-OX) 400 mg tablet   Yes No   Sig: Take 1 tablet by mouth daily   metFORMIN (GLUCOPHAGE) 1000 MG tablet   No No   Sig: Take 1 tablet (1,000 mg total) by mouth daily with breakfast   methocarbamol (ROBAXIN) 500 mg tablet   Yes No   Sig: Take 1 tablet by mouth 3 (three) times a day   naproxen (NAPROSYN) 500 mg tablet   Yes No   Sig: TAKE 1 TABLET EVERY 12 HOURS WITH FOOD AS NEEDED    tamsulosin (FLOMAX) 0 4 mg   No No   Sig: Take 1 capsule (0 4 mg total) by mouth daily with dinner      Facility-Administered Medications: None       Past Medical History:   Diagnosis Date    Diabetes mellitus ([facility] [address] )     Hyperlipidemia     Hypertension     Psychosis        Past Surgical History:   Procedure Laterality Date    ELEVATION OF DEPRESSED SKULL FRACTURE         Family History   Problem Relation Age of Onset    Diabetes Father      I have reviewed and agree with the history as documented  Social History   Substance Use Topics    Smoking status: Never Smoker    Smokeless tobacco: Never Used    Alcohol use No        Review of Systems   Constitutional: Negative for chills and fever  Respiratory: Negative for chest tightness, shortness of breath and wheezing  Cardiovascular: Negative for chest pain and palpitations  Gastrointestinal: Negative for abdominal pain, nausea and vomiting  Musculoskeletal: Positive for arthralgias, joint swelling and myalgias  Negative for back pain, neck pain and neck stiffness  Skin: Negative for rash and wound  Neurological: Negative for dizziness, weakness, light-headedness, numbness and headaches  All other systems reviewed and are negative  Physical Exam  Physical Exam   Constitutional: He is oriented to person, place, and time  Vital signs are normal  He appears well-developed and well-nourished  HENT:   Head: Normocephalic and atraumatic  Eyes: Conjunctivae and EOM are normal  Pupils are equal, round, and reactive to light     Neck: Normal range of motion  Neck supple  Cardiovascular: Normal rate, regular rhythm and normal heart sounds  Pulmonary/Chest: Effort normal and breath sounds normal    Abdominal: Soft  Bowel sounds are normal    Musculoskeletal: Normal range of motion  Right wrist: He exhibits tenderness (No snuffbox tenderness, across dorsal aspect of wrist) and swelling (Mild, to dorsal aspect of right wrist)  He exhibits normal range of motion, no bony tenderness, no deformity and no laceration  Right knee: He exhibits normal range of motion, no swelling, no ecchymosis, no laceration and no erythema  Tenderness found  Medial joint line and lateral joint line tenderness noted  Arms:       Legs:  Neurological: He is alert and oriented to person, place, and time  Skin: Skin is warm and dry  Psychiatric: He has a normal mood and affect  His speech is normal and behavior is normal  Judgment and thought content normal  Cognition and memory are normal    Nursing note and vitals reviewed  Vital Signs  ED Triage Vitals [08/29/18 1419]   Temperature Pulse Respirations Blood Pressure SpO2   98 2 °F (36 8 °C) 103 16 (!) 167/108 95 %      Temp Source Heart Rate Source Patient Position - Orthostatic VS BP Location FiO2 (%)   Temporal Monitor Sitting Left arm --      Pain Score       5           Vitals:    08/29/18 1419 08/29/18 1435 08/29/18 1553   BP: (!) 167/108 (!) 171/111 (!) 176/105   Pulse: 103  89   Patient Position - Orthostatic VS: Sitting  Lying       Visual Acuity      ED Medications  Medications   ibuprofen (MOTRIN) tablet 400 mg (400 mg Oral Given 8/29/18 1435)       Diagnostic Studies  Results Reviewed     None                 XR knee 4+ views Right injury   ED Interpretation by Andrez Hopper PA-C (08/29 1520)   No fracture or dislocation noted      Final Result by Leeanne Dixon MD (08/29 1542)      No acute osseous abnormality              Workstation performed: [id number]         XR wrist 3+ views RIGHT   ED Interpretation by Calvin Luong PA-C (08/29 1520)   Possible chip fracture on distal radius      Final Result by Shelton Orozco MD (08/29 1542)      No acute osseous abnormality  Workstation performed: [id number]                    Procedures  Procedures       Phone Contacts  ED Phone Contact    ED Course                               MDM  Number of Diagnoses or Management Options  Injury of right knee, initial encounter: new and requires workup  Injury of right wrist, initial encounter: new and requires workup  Diagnosis management comments: Radiology did not find any fractures to wrist or knee  Ace wrap placed to right wrist and right knee and patient will follow up with family doctor if no improvement  Also gave number for Orthopedics  Amount and/or Complexity of Data Reviewed  Tests in the radiology section of CPT®: ordered and reviewed  Independent visualization of images, tracings, or specimens: yes    Patient Progress  Patient progress: stable    CritCare Time    Disposition  Final diagnoses:   Injury of right knee, initial encounter   Injury of right wrist, initial encounter     Time reflects when diagnosis was documented in both MDM as applicable and the Disposition within this note     Time User Action Codes Description Comment    8/29/2018  3:40 PM Marga Romero Add [[id number]] Injury of right knee, initial encounter     8/29/2018  3:45 PM [place], [address] Injury of right wrist, initial encounter       ED Disposition     ED Disposition Condition Comment    Discharge  Mayuri Orozco discharge to home/self care      Condition at discharge: Stable        Follow-up Information     Follow up With Specialties Details Why Chsae Amezcua MD Family Medicine Call For Recheck, If symptoms worsen [address]  [place]      [address] Orthopedic Surgery Call in 1 week For Recheck due to snuffbox tenderness, If symptoms worsen [place] [id number] [phone]          Discharge Medication List as of 8/29/2018  3:51 PM      CONTINUE these medications which have NOT CHANGED    Details   atorvastatin (LIPITOR) 10 mg tablet Take 1 tablet (10 mg total) by mouth daily, Starting Mon 8/6/2018, Normal      Blood Glucose Monitoring Suppl (FREESTYLE LITE) CHARAN Used device to check blood sugar once daily  , Normal      divalproex sodium (DEPAKOTE ER) 500 mg 24 hr tablet Take 1,000 mg by mouth daily at bedtime, Starting Mon 1/29/2018, Historical Med      ergocalciferol (VITAMIN D2) 50,000 units Take 1 capsule by mouth once a week, Starting Tue 10/17/2017, Historical Med      gabapentin (NEURONTIN) 100 mg capsule Take 1 capsule (100 mg total) by mouth 3 (three) times a day , increase by 1 capsule every week until taking 3 capsules 3 times a day, Starting Wed 8/29/2018, Normal      glucose blood (FREESTYLE LITE) test strip Use trip to test blood sugar daily  , Normal      Lancets (FREESTYLE) lancets Use lancets to check blood sugar daily  , Normal      lisinopril (ZESTRIL) 5 mg tablet Take 1 tablet by mouth daily, Starting Tue 10/17/2017, Historical Med      magnesium oxide (MAG-OX) 400 mg tablet Take 1 tablet by mouth daily, Starting Fri 10/13/2017, Historical Med      metFORMIN (GLUCOPHAGE) 1000 MG tablet Take 1 tablet (1,000 mg total) by mouth daily with breakfast, Starting Wed 8/29/2018, Normal      methocarbamol (ROBAXIN) 500 mg tablet Take 1 tablet by mouth 3 (three) times a day, Starting Thu 1/4/2018, Historical Med      naproxen (NAPROSYN) 500 mg tablet TAKE 1 TABLET EVERY 12 HOURS WITH FOOD AS NEEDED , Historical Med      QUEtiapine (SEROquel) 200 mg tablet Take 200 mg by mouth daily at bedtime, Starting Mon 1/29/2018, Historical Med      tamsulosin (FLOMAX) 0 4 mg Take 1 capsule (0 4 mg total) by mouth daily with dinner, Starting Tue 3/27/2018, Normal No discharge procedures on file      ED Provider  Electronically Signed by           Ambrose Luong PA-C  09/07/18 2422

## 2018-08-29 NOTE — ED NOTES
Pt states none of his medications has changed since the last time he was here        Sabas Dasilva, REMBERTO  08/29/18 0760

## 2018-08-29 NOTE — DISCHARGE INSTRUCTIONS
Knee Sprain   WHAT YOU NEED TO KNOW:   A knee sprain occurs when one or more ligaments in your knee are suddenly stretched or torn  Ligaments are tissues that hold bones together  Ligaments support the knee and keep the joint and bones in the correct position  DISCHARGE INSTRUCTIONS:   Return to the emergency department if:   · Any part of your leg feels cold, numb, or looks pale     Contact your healthcare provider if:   · You have new or increased swelling, bruising, or pain in your knee  · Your symptoms do not improve within 6 weeks, even with treatment  · You have questions or concerns about your condition or care  Medicines:   · NSAIDs , such as ibuprofen, help decrease swelling, pain, and fever  This medicine is available with or without a doctor's order  NSAIDs can cause stomach bleeding or kidney problems in certain people  If you take blood thinner medicine, always ask your healthcare provider if NSAIDs are safe for you  Always read the medicine label and follow directions  · Acetaminophen  decreases pain and fever  It is available without a doctor's order  Ask how much to take and how often to take it  Follow directions  Read the labels of all other medicines you are using to see if they also contain acetaminophen, or ask your doctor or pharmacist  Acetaminophen can cause liver damage if not taken correctly  Do not use more than 4 grams (4,000 milligrams) total of acetaminophen in one day  · Prescription pain medicine  may be given  Ask how to take this medicine safely  · Take your medicine as directed  Contact your healthcare provider if you think your medicine is not helping or if you have side effects  Tell him or her if you are allergic to any medicine  Keep a list of the medicines, vitamins, and herbs you take  Include the amounts, and when and why you take them  Bring the list or the pill bottles to follow-up visits   Carry your medicine list with you in case of an emergency  Self-care:   · Rest  your knee and do not exercise  You may be told to keep weight off your knee  This means that you should not walk on your injured leg  Rest helps decrease swelling and allows the injury to heal  You can do gentle range of motion (ROM) exercises as directed  This will prevent stiffness  · Apply ice  on your knee for 15 to 20 minutes every hour or as directed  Use an ice pack, or put crushed ice in a plastic bag  Cover it with a towel  Ice helps prevent tissue damage and decreases swelling and pain  · Apply compression to your knee as directed  You may need to wear an elastic bandage  This helps keep your injured knee from moving too much while it heals  You can loosen or tighten the elastic bandage to make it comfortable  It should be tight enough for you to feel support  It should not be so tight that it causes your toes to feel numb or tingly  If you are wearing an elastic bandage, take it off and rewrap it once a day  · Elevate your knee  above the level of your heart as often as you can  This will help decrease swelling and pain  Prop your leg on pillows or blankets to keep it elevated comfortably  Do not put pillows directly behind your knee  · Use support devices as directed:  Support devices such as a splint or brace may be needed  These devices limit movement and protect your joint while it heals  You may be given crutches to use until you can stand on your injured leg without pain  Use devices as directed  Physical therapy:  A physical therapist teaches you exercises to help improve movement and strength, and to decrease pain  Prevent another knee sprain:  Exercise your legs to keep your muscles strong  Strong leg muscles help protect your knee and prevent strain  The following may also prevent a knee sprain:  · Slowly start your exercise or training program   Slowly increase the time, distance, and intensity of your exercise   Sudden increases in training may cause you to injure your knee again  · Wear protective braces and equipment as directed  Braces may prevent your knee from moving the wrong way and causing another sprain  Protective equipment may support your bones and ligaments to prevent injury  · Warm up and stretch before exercise  Warm up by walking or using an exercise bike before starting your regular exercise  Do gentle stretches after warming up  This helps to loosen your muscles and decrease stress on your knee  Cool down and stretch after you exercise  · Wear shoes that fit correctly and support your feet  Replace your running or exercise shoes before the padding or shock absorption is worn out  Ask your healthcare provider which exercise shoes are best for you  Ask if you should wear special shoe inserts  Shoe inserts can help support your heels and arches or keep your foot lined up correctly in your shoes  Exercise on flat surfaces  Follow up with your healthcare provider as directed:  Write down your questions so you remember to ask them during your visits  © 2017 [address] [employer] Information is for End User's use only and may not be sold, redistributed or otherwise used for commercial purposes  All illustrations and images included in CareNotes® are the copyrighted property of A D [employer] M [employer] Inc  or Adarsh Manzo  The above information is an  only  It is not intended as medical advice for individual conditions or treatments  Talk to your doctor, nurse or pharmacist before following any medical regimen to see if it is safe and effective for you  Wrist Injury   WHAT YOU NEED TO KNOW:   A wrist injury happens when the tissues of your wrist joint are damaged  Your wrist joint is made up of tendons, ligaments, nerves, and bones  Two common types of injuries that can happen to your wrist are sprains and strains  A sprain can happen when the ligaments are stretched or torn   Ligaments are bands of elastic tissue that connect and hold the bones together  A strain happens when a tendon or muscle is overused, stretched, or torn  Tendons attach your hand and arm muscles to the bones of the wrist    DISCHARGE INSTRUCTIONS:   Medicines:   · NSAIDs:  These medicines decrease swelling, pain, and fever  NSAIDs are available without a doctor's order  Ask which medicine is right for you  Ask how much to take and when to take it  Take as directed  NSAIDs can cause stomach bleeding and kidney problems if not taken correctly  · Pain medicine: You may be given a prescription medicine to decrease pain  Do not wait until the pain is severe before you take this medicine  · Take your medicine as directed  Contact your healthcare provider if you think your medicine is not helping or if you have side effects  Tell him of her if you are allergic to any medicine  Keep a list of the medicines, vitamins, and herbs you take  Include the amounts, and when and why you take them  Bring the list or the pill bottles to follow-up visits  Carry your medicine list with you in case of an emergency  Follow up with your healthcare provider as directed:  Write down your questions so you remember to ask them during your visits  Manage your symptoms:   · Wrist supports:  A cast or splint may be put on your fingers, hand, and wrist to support your wrist and prevent further damage  Wear these as directed  Ask for instructions on how to bathe while you are wearing a splint or case  · Rest:  You may need to rest your wrist for at least 48 hours and avoid activities that cause pain  Ask what activities you should avoid and for how long  · Ice:  Ice helps decrease swelling and pain  Ice may also help prevent tissue damage  Use an ice pack or put crushed ice in a plastic bag  Cover it with a towel and place it on your injured wrist for 15 to 20 minutes every hour as directed      · Compression:  Your healthcare provider may suggest you wrap your wrist with an elastic bandage  This will help decrease swelling, support your wrist, and help it heal  Wear your wrist wrap as directed  Ask for instructions on how to wrap your wrist     · Elevation:  When you sit or lie down, keep your wrist at or above the level of your heart  This may help decrease pain and swelling  Physical therapy:  Your healthcare provider may recommend that you go to physical therapy  A physical therapist shows you how to do exercises that can help to strengthen your wrist and improve its range of movement  These exercises may also help decrease your pain  Prevent another wrist injury:   · Do strengthening exercises: Your healthcare provider or physical therapist may suggest that you do exercises to strengthen your hand and arm muscles  Ask when you may return to your regular physical activities or sports  If you start to exercise too soon it may cause you to injure your wrist again  · Protect your wrists:  Wrist guard splints or protective tape can help to support your wrist during exercise and sports  These devices may also keep your wrist from bending too far back  Ask for more information about the type of wrist support that you should use  Contact your healthcare provider if:   · You have a fever  · The bruising, swelling, or pain in your wrist gets worse  · You have questions or concerns about your condition or care  Return to the emergency department if:   · The skin on or near your wrist or hand feels cold, or it turns blue or white  · The skin on or near your wrist or hand is very tight, raised, and swollen  · You have new trouble moving and using your hands, fingers, or wrist     · Your wrist, hands, or fingers become swollen, red, numb, or they tingle  · Your wrist has any open wounds, including from surgery, that are red, swollen, warm, or have pus coming from them    © 2017 [employer] Information is for End User's use only and may not be sold, redistributed or otherwise used for commercial purposes  All illustrations and images included in CareNotes® are the copyrighted property of A D A M [employer] Inc  or Adarsh Manzo  The above information is an  only  It is not intended as medical advice for individual conditions or treatments  Talk to your doctor, nurse or pharmacist before following any medical regimen to see if it is safe and effective for you

## 2018-08-29 NOTE — ASSESSMENT & PLAN NOTE
At this time will start on gabapentin to see if this will help with pain relief  Again pending results may refer to Neurosurgery or pain management  With concerns of recurrent falls, and left-sided weakness, would recommend following up with Neurology again

## 2018-08-30 ENCOUNTER — APPOINTMENT (OUTPATIENT)
Dept: PHYSICAL THERAPY | Facility: CLINIC | Age: 48
End: 2018-08-30
Payer: COMMERCIAL

## 2018-09-04 ENCOUNTER — HOSPITAL ENCOUNTER (OUTPATIENT)
Dept: MRI IMAGING | Facility: HOSPITAL | Age: 48
Discharge: HOME | End: 2018-09-04

## 2018-09-04 ENCOUNTER — APPOINTMENT (OUTPATIENT)
Dept: PHYSICAL THERAPY | Facility: CLINIC | Age: 48
End: 2018-09-04
Payer: COMMERCIAL

## 2018-09-04 DIAGNOSIS — M54.16 LUMBAR RADICULOPATHY: ICD-10-CM

## 2018-09-04 DIAGNOSIS — R29.898 WEAKNESS OF LEFT LOWER EXTREMITY: ICD-10-CM

## 2018-09-11 ENCOUNTER — OFFICE VISIT (OUTPATIENT)
Dept: PHYSICAL THERAPY | Facility: CLINIC | Age: 48
End: 2018-09-11
Payer: COMMERCIAL

## 2018-09-11 DIAGNOSIS — M54.16 LUMBAR RADICULOPATHY: Primary | ICD-10-CM

## 2018-09-11 DIAGNOSIS — R29.898 WEAKNESS OF LEFT LOWER EXTREMITY: ICD-10-CM

## 2018-09-11 PROCEDURE — 97010 HOT OR COLD PACKS THERAPY: CPT

## 2018-09-11 PROCEDURE — 97110 THERAPEUTIC EXERCISES: CPT

## 2018-09-11 NOTE — PROGRESS NOTES
Daily Note     Today's date: 2018  Patient name: Gilson Ferrari  : 1970  MRN: [id number]  Referring provider: Mary Melendez PA-C  Dx:   Encounter Diagnosis     ICD-10-CM    1  Lumbar radiculopathy M54 16    2  Weakness of left lower extremity R29 [id number]                   Subjective: Pt presents to PT after a 2 wk hiatus  He noted falling down stairs at home in which he sustained an injury to R wrist and upper thoracic region  He noted undergoing PT at another clinic for R wrist; pt advised to contact insurance company to verify coverage for PT at both facilities and instructed he can be treated for  Each diagnoses at this clinic  Objective: See treatment diary below  Precautions: depression    Daily Treatment Diary     Manual              ISTM  nv           Lumbar mobs  nv                                                      Exercise Diary  18          TA firing bridging 10 x  5"  x10 5"x10          SKR 10x 5"            LTR 20 x 15"x5 15"x5          SLR TA activation 10x 2 3"  x10 3"x10 ea          SKTC  15"x5 15"x5 ea          pball rollouts x3  15"x5 15"x5                                                                                                                                                                                                    Modalities             MHP pre 10' prone 10'                                         Assessment: Tolerated treatment well  No c/o increased pain t/o treatment  Decreased pain post treatment  Patient exhibited good technique with therapeutic exercises and would benefit from continued PT  Plan: Continue per plan of care

## 2018-09-13 ENCOUNTER — OFFICE VISIT (OUTPATIENT)
Dept: PHYSICAL THERAPY | Facility: CLINIC | Age: 48
End: 2018-09-13
Payer: COMMERCIAL

## 2018-09-13 DIAGNOSIS — R29.898 WEAKNESS OF LEFT LOWER EXTREMITY: ICD-10-CM

## 2018-09-13 DIAGNOSIS — M54.16 LUMBAR RADICULOPATHY: Primary | ICD-10-CM

## 2018-09-13 PROCEDURE — 97010 HOT OR COLD PACKS THERAPY: CPT | Performed by: PHYSICAL MEDICINE & REHABILITATION

## 2018-09-13 PROCEDURE — 97110 THERAPEUTIC EXERCISES: CPT | Performed by: PHYSICAL MEDICINE & REHABILITATION

## 2018-09-13 NOTE — PROGRESS NOTES
Daily Note     Today's date: 2018  Patient name: Mayuri Orozco  : 1970  MRN: [id number]  Referring provider: Roslyn Alvarez PA-C  Dx:   Encounter Diagnosis     ICD-10-CM    1  Lumbar radiculopathy M54 16    2  Weakness of left lower extremity R29 [id number]        Start Time: 07  Stop Time: 0820  Total time in clinic (min): 40 minutes    Subjective: Pt states that he has pain in his low back  He states that performing the exercises helps to relieve his sxs  Pt was 10 minutes late to his appointment  Objective: See treatment diary below  Precautions: depression     Daily Treatment Diary      Manual                     ISTM   nv                   Lumbar mobs   nv  lumbar roll NC                                                                                               Exercise Diary  18               TA firing bridging 10 x  5"  x10 5"x10                 SKR 10x 5"                     LTR 20 x 15"x5 15"x5  15 x 5"               SLR TA activation 10x 2 3"  x10 3"x10 ea                 SKTC   15"x5 15"x5 ea  15" x 5               pball rollouts x3   15"x5 15"x5 15" x 5                                                                                                                                                                                                                                                                                                                                                                     Modalities                     MHP pre 8' prone 10' prone                                                                            Assessment: Tolerated treatment well  Patient reports reduction of sxs post tx, he continues to require cueing for proper performance  There appears to be little carry over from visit to visit with pts abilty to remember TE  Plan: Progress treatment as tolerated

## 2018-09-19 ENCOUNTER — OFFICE VISIT (OUTPATIENT)
Dept: PHYSICAL THERAPY | Facility: CLINIC | Age: 48
End: 2018-09-19
Payer: COMMERCIAL

## 2018-09-19 DIAGNOSIS — M54.16 LUMBAR RADICULOPATHY: Primary | ICD-10-CM

## 2018-09-19 DIAGNOSIS — R29.898 WEAKNESS OF LEFT LOWER EXTREMITY: ICD-10-CM

## 2018-09-19 PROCEDURE — 97110 THERAPEUTIC EXERCISES: CPT | Performed by: PHYSICAL THERAPIST

## 2018-09-19 NOTE — PROGRESS NOTES
Daily Note     Today's date: 2018  Patient name: Richar Cotto  : 1970  MRN: [id number]  Referring provider: Lissett Arguelles PA-C  Dx:   Encounter Diagnosis     ICD-10-CM    1  Lumbar radiculopathy M54 16    2  Weakness of left lower extremity R29 898                   Subjective:       Objective: See treatment diary below  Precautions: depression     Daily Treatment Diary      Manual                     ISTM   nv                   Lumbar mobs   nv  lumbar roll NC                                                                                               Exercise Diary  18               TA firing bridging 10 x  5"  x10 5"x10                 SKR 10x 5"                     LTR 20 x 15"x5 15"x5  15 x 5"               SLR TA activation 10x 2 3"  x10 3"x10 ea                 SKTC   15"x5 15"x5 ea  15" x 5               pball rollouts x3   15"x5 15"x5 15" x 5                                                                                                                                                                                                                                                                                                                                                                     Modalities                     MHP pre 8' prone 10' prone                                                                            Assessment: Tolerated treatment well  Patient reports reduction of sxs post tx, he continues to require cueing for proper performance  There appears to be little carry over from visit to visit with pts abilty to remember TE  Plan: Progress treatment as tolerated

## 2018-09-19 NOTE — PROGRESS NOTES
Daily Note     Today's date: 2018  Patient name: Nilesh Martell  : 1970  MRN: [id number]  Referring provider: Ayesha Harada, PA-C  Dx:   Encounter Diagnosis     ICD-10-CM    1  Lumbar radiculopathy M54 16    2  Weakness of left lower extremity R29 898        Start Time: 0800  Stop Time: 0845  Total time in clinic (min): 45 minutes    Subjective: Pt states his low back is at 4/10 pain and denies numbness/tingling into the LE  Objective: See treatment diary below    Precautions: depression     Daily Treatment Diary      Manual                   ISTM   nv                   Lumbar mobs   nv  lumbar roll NC  Lumbar roll JK                                                                                             Exercise Diary  18             Bike     5'        TA firing bridging 10 x  5"  x10 5"x10    5"x15             SKR 10x 5"                     LTR 20 x 15"x5 15"x5  15 x 5"  20x 10"             SLR TA activation 10x 2 3"  x10 3"x10 ea    15x ea             SKTC   15"x5 15"x5 ea  15" x 5  20x 5"              pball rollouts x3   15"x5 15"x5 15" x 5  10"x10              Supine Piriformis stretch          30x3" ea              Seated marches          20x                                                                                                                                                                                                                                                                                                                   Modalities                   MHP pre 8' prone 10' prone  10' seated post                                                                       Assessment: Tolerated treatment well  Patient demonstrated fatigue post treatment, exhibited good technique with therapeutic exercises and would benefit from continued PT  Pt responded well to manual therapy and states he feels "looser" afterwards  Manual therapy was performed at the beginning of treatment to help gain mobility before performing exercises  Pt tolerated new exercises well and reports no pain with exercises  Pt denies pain post treatment  Plan: Continue per plan of care  Progress treatment as tolerated

## 2018-09-20 ENCOUNTER — EVALUATION (OUTPATIENT)
Dept: PHYSICAL THERAPY | Facility: CLINIC | Age: 48
End: 2018-09-20
Payer: COMMERCIAL

## 2018-09-20 DIAGNOSIS — M54.16 LUMBAR RADICULOPATHY: Primary | ICD-10-CM

## 2018-09-20 DIAGNOSIS — R29.898 WEAKNESS OF LEFT LOWER EXTREMITY: ICD-10-CM

## 2018-09-20 PROCEDURE — 97110 THERAPEUTIC EXERCISES: CPT | Performed by: PHYSICAL MEDICINE & REHABILITATION

## 2018-09-20 PROCEDURE — G8978 MOBILITY CURRENT STATUS: HCPCS | Performed by: PHYSICAL MEDICINE & REHABILITATION

## 2018-09-20 PROCEDURE — G8979 MOBILITY GOAL STATUS: HCPCS | Performed by: PHYSICAL MEDICINE & REHABILITATION

## 2018-09-20 PROCEDURE — 97140 MANUAL THERAPY 1/> REGIONS: CPT | Performed by: PHYSICAL MEDICINE & REHABILITATION

## 2018-09-20 NOTE — PROGRESS NOTES
PT Re-Evaluation     Today's date: 2018  Patient name: Beba Howard  : 1970  MRN: [id number]  Referring provider: Britney Colbert PA-C  Dx:   Encounter Diagnosis     ICD-10-CM    1  Lumbar radiculopathy M54 16    2  Weakness of left lower extremity R29 898        Start Time: 730  Stop Time: 3250  Total time in clinic (min): 65 minutes    Assessment  Impairments: abnormal gait, abnormal muscle tone, abnormal or restricted ROM, difficulty understanding, impaired balance, impaired physical strength, lacks appropriate home exercise program, pain with function and poor posture     Assessment details: Guicho Worley has been attending outpatient physical therapy with Lumbar radiculopathy ,Weakness of left lower extremity   Since the last assessment, patient demonstrates decreased pain levels, improved range of motion, improved strength, and increased tolerance to activity  Pt continues to present with pain, demonstrate decreased range of motion, decreased strength, and decreased tolerance to activity  Pt would benefit from continued skilled physical therapy to address limitations and to achieve goals  Thank you for this referral    Understanding of Dx/Px/POC: good   Prognosis: good    Goals  STG;  Decrease pain 3/10 Partially met  Increase MW by 1 grade   Patient will be able to ambulate 10 min without increased pain NOT MET  Decrease lumbar muscle spasm mild Partially met  Improve  FOTO 75 NOT MET  LTG;  Patient will be independent With HEP  Resolve muscle spasms  Decrease pain 0/10  Patient will ambulate  15 min without increased pain  Improve AROM WFL  Improve muscle strength by 1 grade    Improve FOTO 80    Plan  Planned modality interventions: unattended electrical stimulation and thermotherapy: hydrocollator packs  Planned therapy interventions: abdominal trunk stabilization, neuromuscular re-education, patient education, strengthening, stretching, therapeutic exercise, transfer training, home exercise program and gait training  Frequency: 2x week  Duration in weeks: 8        Subjective Evaluation    History of Present Illness  Date of onset: 8/3/2018  Mechanism of injury: Patient reports has been experiecing increased back pain since discharge from PT intervention   (18) Pt states that since beginning PT he is able to move a little better  He states that going up the stairs presents the greatest difficulty daily  He has 20 steps to enter the home  Lifting also increases sxs  Highest level of pain in the past two weeks was 9/10 while he was walking  He states that he experiences increased sxs with ambulation > 1/2 block  Pt states that he has relief with with lumbar flexion exercises, he performs them at home and feels better  He reports performing 1 time a day and then taking medication to fall asleep, he states that he is "too lazy" to perform more than once a day  Recurrent probem    Quality of life: good    Pain  Current pain ratin  At best pain rating: 3  At worst pain ratin  Location: lumbar pain Right more than left  Quality: cramping  Relieving factors: change in position and medications  Aggravating factors: sitting and walking  Progression: improved    Social Support  Steps to enter house: yes  10  Stairs in house: no   Lives in: apartment  Lives with: significant other    Employment status: not working  Hand dominance: right    Treatments  Previous treatment: physical therapy  Current treatment: medication  Patient Goals  Patient goals for therapy: decreased pain and increased motion  Patient goal: "Fortunato knox"        Objective     Static Posture     Lumbar Spine   Increased lordosis  Lumbar spine (Right): Rotated  Palpation   Left   Tenderness of the lumbar paraspinals and quadratus lumborum  Right   Muscle spasm in the lumbar paraspinals  Tenderness of the lumbar paraspinals and quadratus lumborum       Active Range of Motion     Lumbar   Flexion: 40 degrees Extension: 15 degrees   Left lateral flexion: 15 degrees   Right lateral flexion: 20 degrees   Left Hip   Flexion: 85 degrees   Extension: 15 degrees   Abduction: 25 degrees     Right Hip   Flexion: 75 degrees   Extension: 10 degrees   Abduction: 20 degrees     Additional Active Range of Motion Details  IE  FB: 20  BB: 10  RSB: 20  LSB: 20  Rrot: 35  Lrot: 35    Strength/Myotome Testing     Left Hip   Planes of Motion   Flexion: 4  Extension: 4-  Abduction: 4-  Adduction: 4-    Right Hip   Planes of Motion   Flexion: 4+  Extension: 4-  Abduction: 4  Adduction: 4    Left Knee   Flexion: 4-  Extension: 4-    Right Knee   Flexion: 4+  Extension: 4+    Left Ankle/Foot   Dorsiflexion: 3+  Plantar flexion: 4-    Right Ankle/Foot   Dorsiflexion: 4+  Plantar flexion: 4+    Ambulation     Ambulation: Level Surfaces   Ambulation without assistive device: independent    Observational Gait   Gait: asymmetric   Increased right stance time and left swing time  Decreased left stance time and right swing time  Additional Observational Gait Details  Patient with mild deviations of gait pattern with h/o head injury and left sided weakness      Functional Assessment     Comments  Sit to stand: B UE assist, trunk lean to R      Flowsheet Rows      Most Recent Value   PT/OT G-Codes   Current Score  44   Projected Score  63   FOTO information reviewed  Yes   Assessment Type  Re-evaluation   G code set  Mobility: Walking & Moving Around   Mobility: Walking and Moving Around Current Status ()  CK   Mobility: Walking and Moving Around Goal Status ()  CJ          Precautions: depression  Daily Treatment Diary   Warm up->manual->self stretch->function     Manual    8/28 9/13 9/19 9/20             ISTM   nv                   Lumbar mobs   nv  lumbar roll NC  Lumbar roll JK               Paraspinal c movement in seated          NC                                                                   Exercise Diary  8/21/18 8/28 9/11 9/13 9/19 9/20           Bike         5'  5'           TA firing bridging 10 x  5"  x10 5"x10    5"x15             SKTC 10x 5"                     LTR 20 x 15"x5 15"x5  15 x 5"  20x 10"             SLR TA activation 10x 2 3"  x10 3"x10 ea    15x ea  DC           SKTC   15"x5 15"x5 ea  15" x 5  20x 5"   DC           pball rollouts x3   15"x5 15"x5 15" x 5  10"x10  10x10"            Supine Piriformis stretch          30x3" ea              Seated marches          20x             Step ups            20 ea   1R           TM (gait mechanics)                       Lifting mechanics                       Sit->stand                                                                                                                                                                                                                             Modalities  8/28 9/11 9/19 9/20               MHP pre 8' prone 10' prone  10' seated post  10'  seated

## 2018-09-25 ENCOUNTER — OFFICE VISIT (OUTPATIENT)
Dept: PHYSICAL THERAPY | Facility: CLINIC | Age: 48
End: 2018-09-25
Payer: COMMERCIAL

## 2018-09-25 DIAGNOSIS — R29.898 WEAKNESS OF LEFT LOWER EXTREMITY: ICD-10-CM

## 2018-09-25 DIAGNOSIS — E11.9 CONTROLLED TYPE 2 DIABETES MELLITUS WITHOUT COMPLICATION, WITHOUT LONG-TERM CURRENT USE OF INSULIN (HCC): ICD-10-CM

## 2018-09-25 DIAGNOSIS — M54.16 LUMBAR RADICULOPATHY: Primary | ICD-10-CM

## 2018-09-25 PROCEDURE — 97110 THERAPEUTIC EXERCISES: CPT

## 2018-09-25 PROCEDURE — 97140 MANUAL THERAPY 1/> REGIONS: CPT

## 2018-09-25 RX ORDER — BLOOD-GLUCOSE METER
KIT MISCELLANEOUS
Qty: 1 EACH | Refills: 0 | Status: SHIPPED | OUTPATIENT
Start: 2018-09-25

## 2018-09-25 NOTE — PROGRESS NOTES
Daily Note     Today's date: 2018  Patient name: Edita Muniz  : 1970  MRN: [id number]  Referring provider: Xena Powell PA-C  Dx:   Encounter Diagnosis     ICD-10-CM    1  Lumbar radiculopathy M54 16    2  Weakness of left lower extremity R29 893                   Subjective: Pt presents to PT fina, but able to accommodate today  He reported moderate central LBP graded 4/10 pre-tx        Objective: See treatment diary below  Precautions: depression  Daily Treatment Diary   Warm up->manual->self stretch->function     Manual               ISTM lumbar PSM's   nv       TE            Lumbar mobs   nv  lumbar roll NC  Lumbar roll JK    lumbar roll NC           Paraspinal c movement in seated          NC                                                                  Exercise Diary  18         Bike         5'  5'  5'         TA firing bridging 10 x  5"  x10 5"x10    5"x15    3"  2x10         SKTC 10x 5"                     LTR 20 x 15"x5 15"x5  15 x 5"  20x 10"    10"x10         SLR TA activation 10x 2 3"  x10 3"x10 ea    15x ea  DC  ---         SKTC   15"x5 15"x5 ea  15" x 5  20x 5"   DC  ---         pball rollouts x3   15"x5 15"x5 15" x 5  10"x10  10x10 15"x5          Supine Piriformis stretch          30x3" ea  30"x3  30"x3          Seated marches          20x   supine 3"x10 ea         Step ups            20 ea   1R           TM (gait mechanics)                       Lifting mechanics                       Sit->stand                                                                                                                                                                                                                          Modalities               MHP pre 8' prone 10' prone  10' seated post  10'  seated  10' seated post                                                                 Assessment: Tolerated treatment well  Good tolerance to addition of exercises today  Responds well to manuals  Patient exhibited good technique with therapeutic exercises and would benefit from continued PT Concluded with MHP with good response  Plan: Continue per plan of care  Progress NV as tolerable

## 2018-10-09 LAB
LEFT EYE DIABETIC RETINOPATHY: NORMAL
RIGHT EYE DIABETIC RETINOPATHY: NORMAL

## 2018-10-09 PROCEDURE — 3072F LOW RISK FOR RETINOPATHY: CPT | Performed by: PHYSICIAN ASSISTANT

## 2018-10-18 DIAGNOSIS — M54.5 CHRONIC LOW BACK PAIN, UNSPECIFIED BACK PAIN LATERALITY, WITH SCIATICA PRESENCE UNSPECIFIED: Primary | ICD-10-CM

## 2018-10-18 DIAGNOSIS — G89.29 CHRONIC LOW BACK PAIN, UNSPECIFIED BACK PAIN LATERALITY, WITH SCIATICA PRESENCE UNSPECIFIED: Primary | ICD-10-CM

## 2018-10-20 DIAGNOSIS — E11.9 CONTROLLED TYPE 2 DIABETES MELLITUS WITHOUT COMPLICATION, WITHOUT LONG-TERM CURRENT USE OF INSULIN (HCC): ICD-10-CM

## 2018-10-23 ENCOUNTER — TELEPHONE (OUTPATIENT)
Dept: FAMILY MEDICINE CLINIC | Facility: CLINIC | Age: 48
End: 2018-10-23

## 2018-10-23 ENCOUNTER — HOSPITAL ENCOUNTER (OUTPATIENT)
Dept: NEUROLOGY | Facility: HOSPITAL | Age: 48
Discharge: HOME | End: 2018-10-23
Payer: COMMERCIAL

## 2018-10-23 DIAGNOSIS — G56.02 CARPAL TUNNEL SYNDROME ON LEFT: ICD-10-CM

## 2018-10-23 PROCEDURE — 95911 NRV CNDJ TEST 9-10 STUDIES: CPT | Performed by: PSYCHIATRY & NEUROLOGY

## 2018-10-23 RX ORDER — LANCETS 28 GAUGE
EACH MISCELLANEOUS
Qty: 100 EACH | Refills: 2 | Status: SHIPPED | OUTPATIENT
Start: 2018-10-23 | End: 2018-12-10 | Stop reason: SDUPTHER

## 2018-10-23 NOTE — PROCEDURES
[facility]  Electromyography and Nerve Conduction Study      Patient Name:  Gustavo Ashley  MRN: [id number]   :  1970 Date performed: 10/23/2018    Age: 50 y o  Consult request by: Beti Henson PA-C      HISTORY:  Gustavo Ashley is a 50 y o  male who complains of pain, numbness, tingling in his right hand  The pain has been present for the last couple of months  The numbness and tingling is located in his 3rd digit  It is present intermittently  It occurs nocturnally  The "flick sign" is negative  When questioned, he denies neck pain  He admits to neurological problems on his entire left side, including his arm and his leg, secondary to a head injury that he sustained in the   He is referred to rule out carpal tunnel syndrome  Past Medical History:   Diagnosis Date    Diabetes mellitus ([facility] [address] )     Hyperlipidemia     Hypertension     Psychosis          Current Outpatient Prescriptions:     atorvastatin (LIPITOR) 10 mg tablet, Take 1 tablet (10 mg total) by mouth daily, Disp: 90 tablet, Rfl: 1    Blood Glucose Monitoring Suppl (FREESTYLE FREEDOM LITE) w/Device KIT, USED DEVICE TO CHECK BLOOD SUGAR ONCE DAILY  , Disp: 1 each, Rfl: 0    divalproex sodium (DEPAKOTE ER) 500 mg 24 hr tablet, Take 1,000 mg by mouth daily at bedtime, Disp: , Rfl: 1    ergocalciferol (VITAMIN D2) 50,000 units, Take 1 capsule by mouth once a week, Disp: , Rfl:     gabapentin (NEURONTIN) 100 mg capsule, Take 1 capsule (100 mg total) by mouth 3 (three) times a day , increase by 1 capsule every week until taking 3 capsules 3 times a day, Disp: 90 capsule, Rfl: 1    glucose blood (FREESTYLE LITE) test strip, Use trip to test blood sugar daily  , Disp: 100 each, Rfl: 1    Lancets (FREESTYLE) lancets, Use lancets to check blood sugar daily  , Disp: 100 each, Rfl: 1    Lancets (FREESTYLE) lancets, Use lancets to check blood sugar daily  , Disp: 100 each, Rfl: 2    lisinopril (ZESTRIL) 5 mg tablet, Take 1 tablet by mouth daily, Disp: , Rfl:     magnesium oxide (MAG-OX) 400 mg tablet, Take 1 tablet by mouth daily, Disp: , Rfl:     metFORMIN (GLUCOPHAGE) 1000 MG tablet, Take 1 tablet (1,000 mg total) by mouth daily with breakfast, Disp: 90 tablet, Rfl: 0    metFORMIN (GLUCOPHAGE) 1000 MG tablet, Take 1 tablet (1,000 mg total) by mouth daily with breakfast, Disp: 90 tablet, Rfl: 0    methocarbamol (ROBAXIN) 500 mg tablet, Take 1 tablet by mouth 3 (three) times a day, Disp: , Rfl:     naproxen (NAPROSYN) 500 mg tablet, TAKE 1 TABLET EVERY 12 HOURS WITH FOOD AS NEEDED , Disp: , Rfl: 2    QUEtiapine (SEROquel) 200 mg tablet, Take 200 mg by mouth daily at bedtime, Disp: , Rfl: 1    tamsulosin (FLOMAX) 0 4 mg, Take 1 capsule (0 4 mg total) by mouth daily with dinner, Disp: 90 capsule, Rfl: 1    PHYSICAL EXAMINATION:  In general, he was in no acute distress  Upper extremity power was grade 5 bilaterally, except for thumb abduction, which was grade 5 on the right and grade 4 on the left  Phalen's maneuver was negative bilaterally  In the right upper extremity, pinprick sensation was equivalent between the 2nd and 5th digits  Upper extremity reflexes were bilaterally symmetric and within normal limits  RESULTS:  EMG/NCS data and waveforms that were performed for this study have been scanned into Epic  Please refer to scanned document for waveforms  IMPRESSION:   This is an incomplete study, secondary to the patient's decision to terminate the test at the beginning of its needle EMG portion  There is no electrophysiologic evidence of a median neuropathy at the right wrist, despite detailed testing  There is no electrophysiologic evidence of a median neuropathy at the left wrist     This study cannot exclude a cervical radiculopathy, secondary to the patient's decision to end the test prematurely  Clinical correlation is advised      Latoya Verdin MD

## 2018-10-23 NOTE — TELEPHONE ENCOUNTER
----- Message from [employer] sent at 10/23/2018  8:50 AM EDT -----  Regarding: FW: MRI      ----- Message -----  From: Alisha Hein PA-C  Sent: 10/18/2018  12:16 PM  To: Radha Barber  Subject: RE: MRI                                          Referred to pain management     ----- Message -----  From: Mallorie  Sent: 10/17/2018   9:05 AM  To: Alisha Hein PA-C  Subject: MRI                                              MRI DENIED  PLEASE ADVISE ON NEXT STEP   THANKS

## 2018-10-24 DIAGNOSIS — G56.02 CARPAL TUNNEL SYNDROME ON LEFT: Primary | ICD-10-CM

## 2018-11-13 ENCOUNTER — OFFICE VISIT (OUTPATIENT)
Dept: PAIN MEDICINE | Facility: MEDICAL CENTER | Age: 48
End: 2018-11-13
Payer: COMMERCIAL

## 2018-11-13 VITALS
RESPIRATION RATE: 16 BRPM | HEART RATE: 72 BPM | BODY MASS INDEX: 39.92 KG/M2 | HEIGHT: 66 IN | WEIGHT: 248.4 LBS | DIASTOLIC BLOOD PRESSURE: 88 MMHG | SYSTOLIC BLOOD PRESSURE: 130 MMHG

## 2018-11-13 DIAGNOSIS — M47.816 LUMBAR SPONDYLOSIS: Primary | ICD-10-CM

## 2018-11-13 DIAGNOSIS — G89.29 CHRONIC BILATERAL LOW BACK PAIN WITHOUT SCIATICA: ICD-10-CM

## 2018-11-13 DIAGNOSIS — M54.50 CHRONIC BILATERAL LOW BACK PAIN WITHOUT SCIATICA: ICD-10-CM

## 2018-11-13 PROCEDURE — 99204 OFFICE O/P NEW MOD 45 MIN: CPT | Performed by: PHYSICAL MEDICINE & REHABILITATION

## 2018-11-13 RX ORDER — IBUPROFEN 800 MG/1
TABLET ORAL EVERY 6 HOURS PRN
COMMUNITY
End: 2018-12-10 | Stop reason: ALTCHOICE

## 2018-11-13 NOTE — LETTER
November 13, 2018     Adriane Cardoso, [address]    Patient: Suzanne Jerry   YOB: 1970   Date of Visit: 11/13/2018       Dear Dr Albert Taylor: Thank you for referring Suzanne Jerry to me for evaluation  Below are my notes for this consultation  If you have questions, please do not hesitate to call me  I look forward to following your patient along with you  Sincerely,        Dustin Alonso DO        CC: No Recipients  Dustin Alonso DO  11/13/2018  7:25 AM  Sign at close encounter  Assessment:  1  Lumbar spondylosis    2  Chronic bilateral low back pain without sciatica        Plan:  1  We will schedule the patient for bilateral L3-5 medial branch nerve blocks with intention of moving forward towards radiofrequency ablation if there is an appropriate diagnostic response  The initial blocks will be performed with 2% lidocaine and if an appropriate response is obtained upon review of the patient's pain diary, a confirmatory block will be scheduled with 0 5% bupivacaine  In the office today, we reviewed the nature of facet joint pathology in depth using a spine model  We discussed the approach we would use for the injections and provided literature for home review  The patient understands the risks associated with the procedure including bleeding, infection, tissue injury, and allergic reaction and provided verbal informed consent in the office today  2   The patient states that he canceled his lumbar spine MRI as a result of feeling sick that date  I reviewed with him that at this point he should move forward with rescheduling as that may help with the diagnostic picture  If he does not have relief with medial branch nerve blocks I definitely would want him to have the MRI at that point  3   Continue with physical therapy exercise program     4   Consider adding muscle relaxers      My impressions and treatment recommendations were discussed in detail with the patient who verbalized understanding and had no further questions  Discharge instructions were provided  I personally saw and examined the patient and I agree with the above discussed plan of care  Orders Placed This Encounter   Procedures    FL spine and pain procedure     Standing Status:   Future     Standing Expiration Date:   11/13/2019     Order Specific Question:   Reason for Exam:     Answer:   (B) L3-5 MBB#1     Order Specific Question:   Anticoagulant hold needed? Answer:   no     New Medications Ordered This Visit   Medications    ibuprofen (MOTRIN) 800 mg tablet     Sig: Take by mouth every 6 (six) hours as needed for mild pain       History of Present Illness:    Charles Cullen is a 50 y o  male Sent for further evaluation and management of his chronic axial mechanical lower back pain which was a result of a fall  He states that he has been having pain since at least  August without any improvement  He has tried physical therapy  He currently rates the pain as moderate in intensity in scores this as a 7/10  He describes this is intermittent pain worse in the morning characterized as a pressure-like sensation in his lower back  He denies any radiation down his legs  Aggravating factors include standing, bending, sitting, walking, exercise, and relaxation  Alleviating factors include coughing or sneezing  He has not had any imaging  He does ambulate with a single-point cane  Currently participating in physical therapy with some mild relief of symptoms  Currently using ibuprofen 800 mg for pain relief with some improvement as well  The patient is not employed and lists himself as disabled  I have personally reviewed and/or updated the patient's past medical history, past surgical history, family history, social history, current medications, allergies, and vital signs today       Review of Systems:    Review of Systems Constitutional: Negative for fever and unexpected weight change  HENT: Negative for trouble swallowing  Eyes: Positive for visual disturbance  Respiratory: Negative for shortness of breath and wheezing  Cardiovascular: Negative for chest pain and palpitations  Gastrointestinal: Negative for constipation, diarrhea, nausea and vomiting  Endocrine: Negative for cold intolerance, heat intolerance and polydipsia  Genitourinary: Negative for difficulty urinating and frequency  Musculoskeletal: Positive for gait problem  Negative for arthralgias, joint swelling and myalgias  Skin: Negative for rash  Neurological: Positive for weakness  Negative for dizziness, seizures, syncope and headaches  Hematological: Does not bruise/bleed easily  Psychiatric/Behavioral: Negative for dysphoric mood  All other systems reviewed and are negative  Patient Active Problem List   Diagnosis    Chronic back pain    Concussion with no loss of consciousness    [facility]    Insomnia    TBI [facility]traumatic brain injury) [facility]    Visual disturbances    Vitamin D deficiency    Weakness of left side of body    Psychosis [facility]    Benign prostatic hyperplasia with urinary frequency    Carpal tunnel syndrome on left       Past Medical History:   Diagnosis Date    Diabetes mellitus [facility] )     Hyperlipidemia     Hypertension     Psychosis [facility] )        Past Surgical History:   Procedure Laterality Date    ELEVATION OF DEPRESSED SKULL FRACTURE         Family History   Problem Relation Age of Onset    Diabetes Father     Heart failure Mother     No Known Problems Sister     No Known Problems Brother        Social History     Occupational History    Not on file       Social History Main Topics    Smoking status: Never Smoker    Smokeless tobacco: Never Used    Alcohol use No    Drug use: No    Sexual activity: Yes     Partners: Female       Current Outpatient Prescriptions on File Prior to Visit   Medication Sig    atorvastatin (LIPITOR) 10 mg tablet Take 1 tablet (10 mg total) by mouth daily    divalproex sodium (DEPAKOTE ER) 500 mg 24 hr tablet Take 1,000 mg by mouth daily at bedtime    ergocalciferol (VITAMIN D2) 50,000 units Take 1 capsule by mouth once a week    lisinopril (ZESTRIL) 5 mg tablet Take 1 tablet by mouth daily    magnesium oxide (MAG-OX) 400 mg tablet Take 1 tablet by mouth daily    metFORMIN (GLUCOPHAGE) 1000 MG tablet Take 1 tablet (1,000 mg total) by mouth daily with breakfast    QUEtiapine (SEROquel) 200 mg tablet Take 200 mg by mouth daily at bedtime    tamsulosin (FLOMAX) 0 4 mg Take 1 capsule (0 4 mg total) by mouth daily with dinner    Blood Glucose Monitoring Suppl (FREESTYLE FREEDOM LITE) w/Device KIT USED DEVICE TO CHECK BLOOD SUGAR ONCE DAILY  (Patient not taking: Reported on 11/13/2018)    glucose blood (FREESTYLE LITE) test strip Use trip to test blood sugar daily  (Patient not taking: Reported on 11/13/2018 )    Lancets (FREESTYLE) lancets Use lancets to check blood sugar daily  (Patient not taking: Reported on 11/13/2018 )    Lancets (FREESTYLE) lancets Use lancets to check blood sugar daily  (Patient not taking: Reported on 11/13/2018)    [DISCONTINUED] gabapentin (NEURONTIN) 100 mg capsule Take 1 capsule (100 mg total) by mouth 3 (three) times a day , increase by 1 capsule every week until taking 3 capsules 3 times a day    [DISCONTINUED] metFORMIN (GLUCOPHAGE) 1000 MG tablet Take 1 tablet (1,000 mg total) by mouth daily with breakfast    [DISCONTINUED] methocarbamol (ROBAXIN) 500 mg tablet Take 1 tablet by mouth 3 (three) times a day    [DISCONTINUED] naproxen (NAPROSYN) 500 mg tablet TAKE 1 TABLET EVERY 12 HOURS WITH FOOD AS NEEDED  No current facility-administered medications on file prior to visit          No Known Allergies    Physical Exam:    /88   Pulse 72   Resp 16   Ht 5' 6" (1 676 m)   Wt 113 kg (248 lb 6 4 oz)   BMI 40 09 kg/m²        LUMBAR  General: Well-developed, well-nourished individual in no acute distress  Mental: Appropriate mood and affect  Grossly oriented with coherent speech and thought processing   Neuro:  Cranial nerves: Cranial nerve function is grossly intact bilaterally   Strength: Bilateral lower extremity strength is normal and symmetric   No atrophy or tone abnormalities noted   Reflexes: Bilateral lower extremity muscle stretch reflexes are physiologic and symmetric   No ankle clonus is noted   Sensation: No loss of sensation is noted   SLR/Foraminal Compression Maneuvers: Straight leg raising in the sitting position is negative for radicular pain   Gait:  Gait/gross motor: Gait is normal  Station is normal      Musculoskeletal:  Palpation: Inspection and palpation of the spine and extremities are unremarkable except for tenderness to palpation over the lumbosacral region  Spine: Normal pain-free range of motion except for end range forward flexion as well as lumbar extension with rotation bilaterally which reproduces his pain complaint  No gross axial skeletal deformities   Skin: Skin inspection grossly negative for erythema, breakdown, or concerning lesions in affected area   Lymph: No lymphadenopathy is appreciated in the involved extremity   Vessels: No lower extremity edema   Lungs: Breathing is comfortable and regular  No dyspnea noted during examination   Eyes: Visual field grossly intact to confrontation  No redness appreciated  ENT: No craniofacial deformities or asymmetry  No neck masses appreciated           Imaging

## 2018-11-13 NOTE — PROGRESS NOTES
Assessment:  1  Lumbar spondylosis    2  Chronic bilateral low back pain without sciatica        Plan:  1  We will schedule the patient for bilateral L3-5 medial branch nerve blocks with intention of moving forward towards radiofrequency ablation if there is an appropriate diagnostic response  The initial blocks will be performed with 2% lidocaine and if an appropriate response is obtained upon review of the patient's pain diary, a confirmatory block will be scheduled with 0 5% bupivacaine  In the office today, we reviewed the nature of facet joint pathology in depth using a spine model  We discussed the approach we would use for the injections and provided literature for home review  The patient understands the risks associated with the procedure including bleeding, infection, tissue injury, and allergic reaction and provided verbal informed consent in the office today  2   The patient states that he canceled his lumbar spine MRI as a result of feeling sick that date  I reviewed with him that at this point he should move forward with rescheduling as that may help with the diagnostic picture  If he does not have relief with medial branch nerve blocks I definitely would want him to have the MRI at that point  3   Continue with physical therapy exercise program     4   Consider adding muscle relaxers  My impressions and treatment recommendations were discussed in detail with the patient who verbalized understanding and had no further questions  Discharge instructions were provided  I personally saw and examined the patient and I agree with the above discussed plan of care  Orders Placed This Encounter   Procedures    FL spine and pain procedure     Standing Status:   Future     Standing Expiration Date:   11/13/2019     Order Specific Question:   Reason for Exam:     Answer:   (B) L3-5 MBB#1     Order Specific Question:   Anticoagulant hold needed?      Answer:   no     New Medications Ordered This Visit   Medications    ibuprofen (MOTRIN) 800 mg tablet     Sig: Take by mouth every 6 (six) hours as needed for mild pain       History of Present Illness:    Suzanne Jerry is a 50 y o  male Sent for further evaluation and management of his chronic axial mechanical lower back pain which was a result of a fall  He states that he has been having pain since at least  August without any improvement  He has tried physical therapy  He currently rates the pain as moderate in intensity in scores this as a 7/10  He describes this is intermittent pain worse in the morning characterized as a pressure-like sensation in his lower back  He denies any radiation down his legs  Aggravating factors include standing, bending, sitting, walking, exercise, and relaxation  Alleviating factors include coughing or sneezing  He has not had any imaging  He does ambulate with a single-point cane  Currently participating in physical therapy with some mild relief of symptoms  Currently using ibuprofen 800 mg for pain relief with some improvement as well  The patient is not employed and lists himself as disabled  I have personally reviewed and/or updated the patient's past medical history, past surgical history, family history, social history, current medications, allergies, and vital signs today  Review of Systems:    Review of Systems   Constitutional: Negative for fever and unexpected weight change  HENT: Negative for trouble swallowing  Eyes: Positive for visual disturbance  Respiratory: Negative for shortness of breath and wheezing  Cardiovascular: Negative for chest pain and palpitations  Gastrointestinal: Negative for constipation, diarrhea, nausea and vomiting  Endocrine: Negative for cold intolerance, heat intolerance and polydipsia  Genitourinary: Negative for difficulty urinating and frequency  Musculoskeletal: Positive for gait problem   Negative for arthralgias, joint swelling and myalgias  Skin: Negative for rash  Neurological: Positive for weakness  Negative for dizziness, seizures, syncope and headaches  Hematological: Does not bruise/bleed easily  Psychiatric/Behavioral: Negative for dysphoric mood  All other systems reviewed and are negative  Patient Active Problem List   Diagnosis    Chronic back pain    Concussion with no loss of consciousness    [facility] mellitus type II, controlled ([facility] )    Insomnia    TBI (traumatic brain injury) ([place] [address] )    Visual disturbances    Vitamin D deficiency    Weakness of left side of body    Psychosis ([facility] )    Benign prostatic hyperplasia with urinary frequency    Carpal tunnel syndrome on left       Past Medical History:   Diagnosis Date    Diabetes mellitus ([place] [address] )     Hyperlipidemia     Hypertension     Psychosis ([place] [address] )        Past Surgical History:   Procedure Laterality Date    ELEVATION OF DEPRESSED SKULL FRACTURE         Family History   Problem Relation Age of Onset    Diabetes Father     Heart failure Mother     No Known Problems Sister     No Known Problems Brother        Social History     Occupational History    Not on file       Social History Main Topics    Smoking status: Never Smoker    Smokeless tobacco: Never Used    Alcohol use No    Drug use: No    Sexual activity: Yes     Partners: Female       Current Outpatient Prescriptions on File Prior to Visit   Medication Sig    atorvastatin (LIPITOR) 10 mg tablet Take 1 tablet (10 mg total) by mouth daily    divalproex sodium (DEPAKOTE ER) 500 mg 24 hr tablet Take 1,000 mg by mouth daily at bedtime    ergocalciferol (VITAMIN D2) 50,000 units Take 1 capsule by mouth once a week    lisinopril (ZESTRIL) 5 mg tablet Take 1 tablet by mouth daily    magnesium oxide (MAG-OX) 400 mg tablet Take 1 tablet by mouth daily    metFORMIN (GLUCOPHAGE) 1000 MG tablet Take 1 tablet (1,000 mg total) by mouth daily with breakfast    QUEtiapine (SEROquel) 200 mg tablet Take 200 mg by mouth daily at bedtime    tamsulosin (FLOMAX) 0 4 mg Take 1 capsule (0 4 mg total) by mouth daily with dinner    Blood Glucose Monitoring Suppl (FREESTYLE FREEDOM LITE) w/Device KIT USED DEVICE TO CHECK BLOOD SUGAR ONCE DAILY  (Patient not taking: Reported on 11/13/2018)    glucose blood (FREESTYLE LITE) test strip Use trip to test blood sugar daily  (Patient not taking: Reported on 11/13/2018 )    Lancets (FREESTYLE) lancets Use lancets to check blood sugar daily  (Patient not taking: Reported on 11/13/2018 )    Lancets (FREESTYLE) lancets Use lancets to check blood sugar daily  (Patient not taking: Reported on 11/13/2018)    [DISCONTINUED] gabapentin (NEURONTIN) 100 mg capsule Take 1 capsule (100 mg total) by mouth 3 (three) times a day , increase by 1 capsule every week until taking 3 capsules 3 times a day    [DISCONTINUED] metFORMIN (GLUCOPHAGE) 1000 MG tablet Take 1 tablet (1,000 mg total) by mouth daily with breakfast    [DISCONTINUED] methocarbamol (ROBAXIN) 500 mg tablet Take 1 tablet by mouth 3 (three) times a day    [DISCONTINUED] naproxen (NAPROSYN) 500 mg tablet TAKE 1 TABLET EVERY 12 HOURS WITH FOOD AS NEEDED  No current facility-administered medications on file prior to visit  No Known Allergies    Physical Exam:    /88   Pulse 72   Resp 16   Ht 5' 6" (1 676 m)   Wt 113 kg (248 lb 6 4 oz)   BMI 40 09 kg/m²       LUMBAR  General: Well-developed, well-nourished individual in no acute distress  Mental: Appropriate mood and affect  Grossly oriented with coherent speech and thought processing   Neuro:  Cranial nerves: Cranial nerve function is grossly intact bilaterally   Strength: Bilateral lower extremity strength is normal and symmetric   No atrophy or tone abnormalities noted   Reflexes: Bilateral lower extremity muscle stretch reflexes are physiologic and symmetric   No ankle clonus is noted      Sensation: No loss of sensation is noted    SLR/Foraminal Compression Maneuvers: Straight leg raising in the sitting position is negative for radicular pain   Gait:  Gait/gross motor: Gait is normal  Station is normal      Musculoskeletal:  Palpation: Inspection and palpation of the spine and extremities are unremarkable except for tenderness to palpation over the lumbosacral region  Spine: Normal pain-free range of motion except for end range forward flexion as well as lumbar extension with rotation bilaterally which reproduces his pain complaint  No gross axial skeletal deformities   Skin: Skin inspection grossly negative for erythema, breakdown, or concerning lesions in affected area   Lymph: No lymphadenopathy is appreciated in the involved extremity   Vessels: No lower extremity edema   Lungs: Breathing is comfortable and regular  No dyspnea noted during examination   Eyes: Visual field grossly intact to confrontation  No redness appreciated  ENT: No craniofacial deformities or asymmetry  No neck masses appreciated           Imaging

## 2018-11-24 DIAGNOSIS — E11.9 CONTROLLED TYPE 2 DIABETES MELLITUS WITHOUT COMPLICATION, WITHOUT LONG-TERM CURRENT USE OF INSULIN (HCC): ICD-10-CM

## 2018-11-26 RX ORDER — LANCETS 28 GAUGE
EACH MISCELLANEOUS
Qty: 100 EACH | Refills: 1 | Status: SHIPPED | OUTPATIENT
Start: 2018-11-26 | End: 2019-03-05 | Stop reason: SDUPTHER

## 2018-12-04 DIAGNOSIS — E11.9 CONTROLLED TYPE 2 DIABETES MELLITUS WITHOUT COMPLICATION, WITHOUT LONG-TERM CURRENT USE OF INSULIN (HCC): ICD-10-CM

## 2018-12-06 DIAGNOSIS — R33.9 URINARY RETENTION: ICD-10-CM

## 2018-12-06 RX ORDER — TAMSULOSIN HYDROCHLORIDE 0.4 MG/1
0.4 CAPSULE ORAL
Qty: 30 CAPSULE | Refills: 0 | Status: SHIPPED | OUTPATIENT
Start: 2018-12-06 | End: 2019-02-13 | Stop reason: ALTCHOICE

## 2018-12-10 ENCOUNTER — OFFICE VISIT (OUTPATIENT)
Dept: FAMILY MEDICINE CLINIC | Facility: CLINIC | Age: 48
End: 2018-12-10
Payer: COMMERCIAL

## 2018-12-10 VITALS
HEIGHT: 66 IN | RESPIRATION RATE: 16 BRPM | BODY MASS INDEX: 39.7 KG/M2 | OXYGEN SATURATION: 96 % | WEIGHT: 247 LBS | HEART RATE: 72 BPM | SYSTOLIC BLOOD PRESSURE: 150 MMHG | DIASTOLIC BLOOD PRESSURE: 94 MMHG

## 2018-12-10 DIAGNOSIS — M54.50 CHRONIC BILATERAL LOW BACK PAIN WITHOUT SCIATICA: ICD-10-CM

## 2018-12-10 DIAGNOSIS — E55.9 VITAMIN D DEFICIENCY: ICD-10-CM

## 2018-12-10 DIAGNOSIS — G44.329 CHRONIC POST-TRAUMATIC HEADACHE, NOT INTRACTABLE: Primary | ICD-10-CM

## 2018-12-10 DIAGNOSIS — E11.8 CONTROLLED TYPE 2 DIABETES MELLITUS WITH COMPLICATION, WITHOUT LONG-TERM CURRENT USE OF INSULIN (HCC): ICD-10-CM

## 2018-12-10 DIAGNOSIS — Z23 NEED FOR INFLUENZA VACCINATION: ICD-10-CM

## 2018-12-10 DIAGNOSIS — G89.29 CHRONIC BILATERAL LOW BACK PAIN WITHOUT SCIATICA: ICD-10-CM

## 2018-12-10 PROCEDURE — G0008 ADMIN INFLUENZA VIRUS VAC: HCPCS

## 2018-12-10 PROCEDURE — 90686 IIV4 VACC NO PRSV 0.5 ML IM: CPT

## 2018-12-10 PROCEDURE — 3008F BODY MASS INDEX DOCD: CPT | Performed by: FAMILY MEDICINE

## 2018-12-10 PROCEDURE — 99203 OFFICE O/P NEW LOW 30 MIN: CPT | Performed by: FAMILY MEDICINE

## 2018-12-10 PROCEDURE — 1036F TOBACCO NON-USER: CPT | Performed by: FAMILY MEDICINE

## 2018-12-10 RX ORDER — SITAGLIPTIN 100 MG/1
100 TABLET, FILM COATED ORAL EVERY MORNING
Refills: 0 | COMMUNITY
Start: 2018-10-15 | End: 2019-02-16 | Stop reason: SDUPTHER

## 2018-12-10 RX ORDER — MELOXICAM 15 MG/1
15 TABLET ORAL DAILY
Qty: 30 TABLET | Refills: 1 | Status: SHIPPED | OUTPATIENT
Start: 2018-12-10 | End: 2019-09-09 | Stop reason: ALTCHOICE

## 2018-12-10 NOTE — PROGRESS NOTES
Subjective:   Chief Complaint   Patient presents with    Headache        Patient ID: Alvarez Edwards is a 50 y o  male  Patient is a new in my office and he had a concerned about the headache and back pain and patient he had a history of the post traumatic headache chronic and it has been going on since 1980 patient when he had the trying to commit suicide and [place] off and he hit his head and he had surgery on the left side of the skull and since then patient does have a headache on and off and this headache is not new on not different and the correct arise and patient also had a chronic weakness on his left side left upper extremity patient deny any fever no change in the weight posiytive  Numbness ,positive dizziness no lose control of the urine or stool no blurred vision no double vision no sensitivity to light or sensitivity to the nose and no change in the personality  Also patient had a history of for chronic back pain and today also concerned about the pain in his lumbar spine describe it as achy and 6/10 localized in the lumbar spine and radiate to his lower extremity ago below-the-knee worse with standing go up and down steps nothing make it better patient has been taking the Motrin and is not helping by looking into his record the patient the ready had some x-ray of the lumbar spine and MRI of lumbar spine has been ordered he did not do it yet and patient is supposed to be on gabapentin and he did not started patient feel he is on too many medication        The following portions of the patient's history were reviewed and updated as appropriate: allergies, current medications, past family history, past medical history, past social history, past surgical history and problem list     Review of Systems   Constitutional: Negative for fatigue and fever  HENT: Negative for ear pain, sinus pain, sinus pressure and sore throat  Eyes: Negative for pain and redness     Respiratory: Negative for cough, chest tightness and shortness of breath  Cardiovascular: Negative for chest pain, palpitations and leg swelling  Gastrointestinal: Negative for abdominal pain, blood in stool, constipation, diarrhea and nausea  Genitourinary: Negative for flank pain, frequency and hematuria  Musculoskeletal: Negative for back pain and joint swelling  Skin: Negative for rash  Neurological: Positive for dizziness, numbness and headaches  Hematological: Does not bruise/bleed easily  Objective:  Vitals:    12/10/18 1315   BP: 150/94   BP Location: Left arm   Patient Position: Sitting   Cuff Size: Large   Pulse: 72   Resp: 16   SpO2: 96%   Weight: 112 kg (247 lb)   Height: 5' 6" (1 676 m)      Physical Exam   Constitutional: He is oriented to person, place, and time  He appears well-developed and well-nourished  HENT:   Head: Normocephalic  Right Ear: External ear normal    Left Ear: External ear normal    Eyes: Conjunctivae and EOM are normal  Right eye exhibits no discharge  Left eye exhibits no discharge  Neck: No JVD present  Cardiovascular: Normal rate, regular rhythm and normal heart sounds  Exam reveals no gallop  No murmur heard  Pulmonary/Chest: Effort normal  No respiratory distress  He has no wheezes  He has no rales  He exhibits no tenderness  Abdominal: He exhibits no mass  There is no tenderness  There is no rebound  Musculoskeletal: He exhibits tenderness  He exhibits no edema  The positive tenderness on the lumbar spine L4-L5 from worse and the left side    Neurological: He is alert and oriented to person, place, and time  Decreased deep tender reflexes in the patellar area bilateral lower extremity the also weakness on his left upper extremity muscle tone 3/5   Skin: No rash noted  No erythema           Assessment/Plan:    Chronic post-traumatic headache, not intractable  Chronic post traumatic history of fall in 1980 MRI of the brain and February 2017 has been review no significant sent finding discussed with the patient will hydration Tylenol for the pain the plan to refer the patient to see Neurology    Chronic back pain  Acute on chronic back pain we will start Mobic 15mg tablet once a day proper use of medication possible side effect discussed with the patient we discussed with the patient important lose weight there is order  for MRI of the lumbar spine patient did not do it yet  We discussed with the patient important lose weight       Diagnoses and all orders for this visit:    Chronic post-traumatic headache, not intractable  -     CBC and differential; Future  -     Comprehensive metabolic panel; Future  -     Lipid panel; Future  -     TSH, 3rd generation with Free T4 reflex; Future  -     Microalbumin / creatinine urine ratio; Future  -     Hemoglobin A1C; Future  -     Vitamin D 25 hydroxy  -     Ambulatory referral to Neurology; Future    Chronic bilateral low back pain without sciatica  -     meloxicam (MOBIC) 15 mg tablet; Take 1 tablet (15 mg total) by mouth daily  -     CBC and differential; Future  -     Comprehensive metabolic panel; Future  -     Lipid panel; Future  -     TSH, 3rd generation with Free T4 reflex; Future  -     Microalbumin / creatinine urine ratio; Future  -     Hemoglobin A1C; Future  -     Vitamin D 25 hydroxy    Need for influenza vaccination  -     SYRINGE/SINGLE-DOSE VIAL: influenza vaccine, [id number], quadrivalent, 0 5 mL, preservative-free, for patients 3+ yr (FLUZONE)    Controlled type 2 diabetes mellitus with complication, without long-term current use of insulin ([facility])  -     CBC and differential; Future  -     Comprehensive metabolic panel; Future  -     Lipid panel; Future  -     TSH, 3rd generation with Free T4 reflex; Future  -     Microalbumin / creatinine urine ratio;  Future  -     Hemoglobin A1C; Future  -     Vitamin D 25 hydroxy    Vitamin D deficiency  -     CBC and differential; Future  -     Comprehensive metabolic panel; Future  -     Lipid panel; Future  -     TSH, 3rd generation with Free T4 reflex; Future  -     Microalbumin / creatinine urine ratio; Future  -     Hemoglobin A1C; Future  -     Vitamin D 25 hydroxy    Other orders  -     JANUVIA 100 MG tablet;  Take 100 mg by mouth every morning

## 2018-12-11 PROBLEM — G44.329 CHRONIC POST-TRAUMATIC HEADACHE, NOT INTRACTABLE: Status: ACTIVE | Noted: 2018-12-11

## 2018-12-11 NOTE — ASSESSMENT & PLAN NOTE
Chronic post traumatic history of fall in 1980 MRI of the brain and February 2017 has been review no significant sent finding discussed with the patient will hydration Tylenol for the pain the plan to refer the patient to see Neurology

## 2018-12-11 NOTE — ASSESSMENT & PLAN NOTE
Acute on chronic back pain we will start Mobic 15mg tablet once a day proper use of medication possible side effect discussed with the patient we discussed with the patient important lose weight there is order  for MRI of the lumbar spine patient did not do it yet  We discussed with the patient important lose weight

## 2018-12-19 DIAGNOSIS — R33.9 URINARY RETENTION: ICD-10-CM

## 2018-12-19 RX ORDER — TAMSULOSIN HYDROCHLORIDE 0.4 MG/1
0.4 CAPSULE ORAL
Qty: 30 CAPSULE | Refills: 0 | OUTPATIENT
Start: 2018-12-19

## 2018-12-27 ENCOUNTER — HOSPITAL ENCOUNTER (OUTPATIENT)
Dept: RADIOLOGY | Facility: MEDICAL CENTER | Age: 48
Discharge: HOME | End: 2018-12-27

## 2019-01-05 DIAGNOSIS — R33.9 URINARY RETENTION: ICD-10-CM

## 2019-01-08 ENCOUNTER — OFFICE VISIT (OUTPATIENT)
Dept: NEUROLOGY | Facility: CLINIC | Age: 49
End: 2019-01-08
Payer: COMMERCIAL

## 2019-01-08 VITALS
HEART RATE: 72 BPM | BODY MASS INDEX: 38.2 KG/M2 | HEIGHT: 67 IN | RESPIRATION RATE: 18 BRPM | SYSTOLIC BLOOD PRESSURE: 128 MMHG | WEIGHT: 243.4 LBS | DIASTOLIC BLOOD PRESSURE: 88 MMHG

## 2019-01-08 DIAGNOSIS — G43.009 MIGRAINE WITHOUT AURA AND WITHOUT STATUS MIGRAINOSUS, NOT INTRACTABLE: Primary | ICD-10-CM

## 2019-01-08 DIAGNOSIS — G44.329 CHRONIC POST-TRAUMATIC HEADACHE, NOT INTRACTABLE: ICD-10-CM

## 2019-01-08 PROCEDURE — 99203 OFFICE O/P NEW LOW 30 MIN: CPT | Performed by: PSYCHIATRY & NEUROLOGY

## 2019-01-08 RX ORDER — IBUPROFEN 800 MG/1
TABLET ORAL EVERY 6 HOURS PRN
COMMUNITY
End: 2019-03-05 | Stop reason: ALTCHOICE

## 2019-01-08 RX ORDER — BLOOD-GLUCOSE METER
KIT MISCELLANEOUS
Refills: 0 | COMMUNITY
Start: 2018-12-26

## 2019-01-08 RX ORDER — TOPIRAMATE 25 MG/1
TABLET ORAL
Qty: 60 TABLET | Refills: 2 | Status: SHIPPED | OUTPATIENT
Start: 2019-01-08 | End: 2019-05-21 | Stop reason: SDUPTHER

## 2019-01-08 RX ORDER — TAMSULOSIN HYDROCHLORIDE 0.4 MG/1
0.4 CAPSULE ORAL
Qty: 30 CAPSULE | Refills: 0 | OUTPATIENT
Start: 2019-01-08

## 2019-01-08 NOTE — LETTER
January 8, 2019     Kwabena Vizcaino MD  [address] [facility] [address]    Patient: Vamsi Red   YOB: 1970   Date of Visit: 1/8/2019       Dear Dr Andres Bunch: Thank you for referring Vamsi Red to me for evaluation  Below are my notes for this consultation  If you have questions, please do not hesitate to call me  I look forward to following your patient along with you  Sincerely,        Christina Devlin MD        CC: No Recipients  Christina Devlin MD  1/8/2019 10:00 AM  Sign at close encounter  Patient is here today as a new patient for his headaches    Patient ID: Vamsi Red is a 50 y o  male  Assessment/Plan:    Migraine without aura and without status migrainosus, not intractable  History as presented certainly in keeping with the diagnosis of migraine without aura  He does relate the onset to following a head injury which occurred at age 23 years which likely uncovered an underlying migraine circumstance  His present frequency would suggest the appropriateness of beginning a daily preventative medication  --to initiate topiramate beginning with 25 mg at bedtime nightly  This can be advanced pending clinical response and tolerance (Patient with no history of kidney stones or glaucoma and currently is not taking metformin)  --instructed to drink extra fluids during the day while on topiramate and should any unusual eye pain developed to stop the topiramate and call the office  --to keep a headache diary  --to call in two weeks with a status update and to discuss possible further advancement in the topiramate schedule  He will call before then should he have any questions or concerns or difficulties tolerating the initiation of topiramate  --she will continue to judiciously use over-the-counter symptomatic agents  He will follow up in eight weeks      Subjective:    HPI  Patient, 48 years and right-handed, presents for further evaluation regarding ongoing headache issues  He was accompanied today by his wife  Because of intermittent language issues, and office  was present to assist   His particular headache circumstance has a long time presence, dating back to age 22-23 years of age  He states that prior to the emergence of his headaches, he did have a close head injury  His headaches are for the most part stereotyped  He describes a slow advancing headache over the top of his head which becomes progressively more severe and of a pulsatile quality  The headaches are unassociated with nausea or vomiting  However, he does describe issues with light and sound sensitivity during the headache  Duration of headache several hours to half a day  Recent frequency of headache one and often two episodes on a weekly basis  He does not recall ever being placed on a daily preventative medication for his headaches  He does use over-the-counter ibuprofen, etc, for symptomatic control  I did have an opportunity to review past studies  In October of 2017, he had a CT brain performed that study demonstrated no acute changes, but did demonstrate mild right frontal temporal volume loss which one would presume resulted from his remote trauma  In August he did have baseline blood work performed  CBC with hemoglobin of 13 4, crit 43 9, white count 6 63 and platelet count 129  CMP was essentially unremarkable except for blood sugar at 152 (patient diabetic)      Past Medical History:   Diagnosis Date    Diabetes mellitus ([address] )     Hyperlipidemia     Hypertension     Psychosis ([address] )      Past Surgical History:   Procedure Laterality Date    ELEVATION OF DEPRESSED SKULL FRACTURE       Social History     Social History    Marital status: Single     Spouse name: N/A    Number of children: N/A    Years of education: N/A     Social History Main Topics    Smoking status: Never Smoker    Smokeless tobacco: Never Used    Alcohol use No    Drug use: No    Sexual activity: Yes     Partners: Female     Other Topics Concern    None     Social History Narrative    Lives with significant other     Family History   Problem Relation Age of Onset    Diabetes Father     Heart failure Mother     No Known Problems Sister     No Known Problems Brother      No Known Allergies    Current Outpatient Prescriptions:     atorvastatin (LIPITOR) 10 mg tablet, Take 1 tablet (10 mg total) by mouth daily, Disp: 90 tablet, Rfl: 1    Blood Glucose Monitoring Suppl (FREESTYLE FREEDOM LITE) w/Device KIT, USED DEVICE TO CHECK BLOOD SUGAR ONCE DAILY  , Disp: 1 each, Rfl: 0    ergocalciferol (VITAMIN D2) 50,000 units, Take 1 capsule by mouth once a week, Disp: , Rfl:     glucose blood (FREESTYLE LITE) test strip, Use trip to test blood sugar daily  , Disp: 100 each, Rfl: 1    ibuprofen (MOTRIN) 800 mg tablet, Take by mouth every 6 (six) hours as needed for mild pain, Disp: , Rfl:     JANUVIA 100 MG tablet, Take 100 mg by mouth every morning, Disp: , Rfl: 0    Lancets (FREESTYLE) lancets, USE LANCETS TO CHECK BLOOD SUGAR DAILY  , Disp: 100 each, Rfl: 1    lisinopril (ZESTRIL) 5 mg tablet, Take 1 tablet by mouth daily, Disp: , Rfl:     magnesium oxide (MAG-OX) 400 mg tablet, Take 1 tablet by mouth daily, Disp: , Rfl:     meloxicam (MOBIC) 15 mg tablet, Take 1 tablet (15 mg total) by mouth daily, Disp: 30 tablet, Rfl: 1    tamsulosin (FLOMAX) 0 4 mg, TAKE 1 CAPSULE (0 4 MG TOTAL) BY MOUTH DAILY WITH DINNER, Disp: 30 capsule, Rfl: 0    Blood Glucose Monitoring Suppl (FREESTYLE LITE) CHARAN, Use as directed, Disp: , Rfl: 0    metFORMIN (GLUCOPHAGE) 1000 MG tablet, Take 1 tablet (1,000 mg total) by mouth daily with breakfast (Patient not taking: Reported on 1/8/2019 ), Disp: 90 tablet, Rfl: 0    PHARMACIST CHOICE ALCOHOL 70 % PADS, USE 3 TIMES A DAY TO 4 TIMES A DAY, Disp: , Rfl: 11    topiramate (TOPAMAX) 25 mg tablet, By oral route take 1 tablet twice daily or as directed, Disp: 60 tablet, Rfl: 2    Objective:    Blood pressure 128/88, pulse 72, resp  rate 18, height 5' 6 5" (1 689 m), weight 110 kg (243 lb 6 4 oz)  Physical Exam  Head normocephalic  Scar noted left occipital area  No audible head or anterior neck bruits  Lungs clear to auscultation  Rhythm regular  GI (abdomen) soft nontender with bowel sounds present  No significant lower extremity edema  Neurological Exam  Alert  Pleasantly interactive  Fully oriented  Unremarkable spontaneous gait  Able to tandem walk  Romberg maneuver performed unremarkably  Cranial Nerves:   I: Olfactory sense intact bilaterally  II: Visual fields full to confrontation  Pupils equal, round, reactive to light with normal accomodation  Fundus: with bilaterally marginated discs  III,IV,VI: Extraocular muscles EOMI, no nystagmus  V: [place] and pterygoid strength  Sensation in the V1 through V3 distributions intact to pinprick and light touch bilaterally  VII: Face is symmetric with no weakness noted  VIII: Audition intact to finger rub bilaterally  IX/X: Uvula midline  Soft palate elevation symmetric  XI: Trapezius and SCM strength 5/5 bilaterally  XII: Tongue midline with no atrophy or fasciculations with appropriate movement  Accurate with finger-to-nose and heel-to-shin maneuvers bilaterally  Full symmetrical strength throughout the four extremities with no upper extremity drift  Sensory testing grossly intact to pin, position and vibration throughout  No extinction with double simultaneous stimulation  Muscle stretch reflexes bilaterally 2 throughout the upper extremities, at the knees and at the ankles  Toe response downgoing bilaterally  ROS:    Review of Systems   Constitutional: Negative for appetite change and fever  HENT: Negative  Negative for hearing loss, tinnitus, trouble swallowing and voice change  Eyes: Negative  Negative for photophobia and pain  Respiratory: Negative  Negative for shortness of breath  Cardiovascular: Negative  Negative for palpitations  Gastrointestinal: Negative  Negative for nausea and vomiting  Endocrine: Negative  Negative for cold intolerance and heat intolerance  Genitourinary: Negative  Negative for dysuria, frequency and urgency  Musculoskeletal: Negative  Negative for myalgias and neck pain  Skin: Negative  Negative for rash  Neurological: Positive for weakness (left side of his body) and headaches  Negative for dizziness, tremors, seizures, syncope, facial asymmetry, speech difficulty, light-headedness and numbness  Hematological: Negative  Does not bruise/bleed easily  Psychiatric/Behavioral: Negative  Negative for confusion, hallucinations and sleep disturbance  I personally reviewed the ROS that was entered by the medical assistant  *Please note this document was created using voice recognition software and may contain sound-alike word errors  *

## 2019-01-08 NOTE — ASSESSMENT & PLAN NOTE
History as presented certainly in keeping with the diagnosis of migraine without aura  He does relate the onset to following a head injury which occurred at age 23 years which likely uncovered an underlying migraine circumstance  His present frequency would suggest the appropriateness of beginning a daily preventative medication  --to initiate topiramate beginning with 25 mg at bedtime nightly  This can be advanced pending clinical response and tolerance (Patient with no history of kidney stones or glaucoma and currently is not taking metformin)  --instructed to drink extra fluids during the day while on topiramate and should any unusual eye pain developed to stop the topiramate and call the office  --to keep a headache diary  --to call in two weeks with a status update and to discuss possible further advancement in the topiramate schedule  He will call before then should he have any questions or concerns or difficulties tolerating the initiation of topiramate  --she will continue to judiciously use over-the-counter symptomatic agents

## 2019-01-08 NOTE — PATIENT INSTRUCTIONS
Begin topiramate using 25 mg tablets taking one tablet at bedtime nightly  While on topiramate drink extra fluids during the day and if any significant eye pain developes stop the topiramate and call the office  Keep a headache diary  Call the office in two weeks with a status report or before then if you have any questions or concerns

## 2019-01-08 NOTE — PROGRESS NOTES
Patient is here today as a new patient for his headaches    Patient ID: Guillermina Sarabia is a 50 y o  male  Assessment/Plan:    Migraine without aura and without status migrainosus, not intractable  History as presented certainly in keeping with the diagnosis of migraine without aura  He does relate the onset to following a head injury which occurred at age 23 years which likely uncovered an underlying migraine circumstance  His present frequency would suggest the appropriateness of beginning a daily preventative medication  --to initiate topiramate beginning with 25 mg at bedtime nightly  This can be advanced pending clinical response and tolerance (Patient with no history of kidney stones or glaucoma and currently is not taking metformin)  --instructed to drink extra fluids during the day while on topiramate and should any unusual eye pain developed to stop the topiramate and call the office  --to keep a headache diary  --to call in two weeks with a status update and to discuss possible further advancement in the topiramate schedule  He will call before then should he have any questions or concerns or difficulties tolerating the initiation of topiramate  --she will continue to judiciously use over-the-counter symptomatic agents  I spent a total of 40 min with the patient with greater than 50% of that time spent counseling and coordinating his care, specifically discussing his diagnosis, additional tests, and discussing the case with his care team, as detailed above  He will follow up in eight weeks  Subjective:    HPI  Patient, 48 years and right-handed, presents for further evaluation regarding ongoing headache issues  He was accompanied today by his wife  Because of intermittent language issues, and office  was present to assist   His particular headache circumstance has a long time presence, dating back to age 22-23 years of age    He states that prior to the emergence of his headaches, he did have a close head injury  His headaches are for the most part stereotyped  He describes a slow advancing headache over the top of his head which becomes progressively more severe and of a pulsatile quality  The headaches are unassociated with nausea or vomiting  However, he does describe issues with light and sound sensitivity during the headache  Duration of headache several hours to half a day  Recent frequency of headache one and often two episodes on a weekly basis  He does not recall ever being placed on a daily preventative medication for his headaches  He does use over-the-counter ibuprofen, etc, for symptomatic control  I did have an opportunity to review with patient and wife patient's past study results  In October of 2017, he had a CT brain performed that study demonstrated no acute changes, but did demonstrate mild right frontal temporal volume loss which one would presume resulted from his remote trauma  In August he did have baseline blood work performed  CBC with hemoglobin of 13 4, crit 43 9, white count 6 63 and platelet count 585  CMP was essentially unremarkable except for blood sugar at 152 (patient diabetic)      Past Medical History:   Diagnosis Date    Diabetes mellitus ([facility] )     Hyperlipidemia     Hypertension     Psychosis ([facility] )      Past Surgical History:   Procedure Laterality Date    ELEVATION OF DEPRESSED SKULL FRACTURE       Social History     Social History    Marital status: Single     Spouse name: N/A    Number of children: N/A    Years of education: N/A     Social History Main Topics    Smoking status: Never Smoker    Smokeless tobacco: Never Used    Alcohol use No    Drug use: No    Sexual activity: Yes     Partners: Female     Other Topics Concern    None     Social History Narrative    Lives with significant other     Family History   Problem Relation Age of Onset    Diabetes Father     Heart failure Mother     No Known Problems Sister     No Known Problems Brother      No Known Allergies    Current Outpatient Prescriptions:     atorvastatin (LIPITOR) 10 mg tablet, Take 1 tablet (10 mg total) by mouth daily, Disp: 90 tablet, Rfl: 1    Blood Glucose Monitoring Suppl (FREESTYLE FREEDOM LITE) w/Device KIT, USED DEVICE TO CHECK BLOOD SUGAR ONCE DAILY  , Disp: 1 each, Rfl: 0    ergocalciferol (VITAMIN D2) 50,000 units, Take 1 capsule by mouth once a week, Disp: , Rfl:     glucose blood (FREESTYLE LITE) test strip, Use trip to test blood sugar daily  , Disp: 100 each, Rfl: 1    ibuprofen (MOTRIN) 800 mg tablet, Take by mouth every 6 (six) hours as needed for mild pain, Disp: , Rfl:     JANUVIA 100 MG tablet, Take 100 mg by mouth every morning, Disp: , Rfl: 0    Lancets (FREESTYLE) lancets, USE LANCETS TO CHECK BLOOD SUGAR DAILY  , Disp: 100 each, Rfl: 1    lisinopril (ZESTRIL) 5 mg tablet, Take 1 tablet by mouth daily, Disp: , Rfl:     magnesium oxide (MAG-OX) 400 mg tablet, Take 1 tablet by mouth daily, Disp: , Rfl:     meloxicam (MOBIC) 15 mg tablet, Take 1 tablet (15 mg total) by mouth daily, Disp: 30 tablet, Rfl: 1    tamsulosin (FLOMAX) 0 4 mg, TAKE 1 CAPSULE (0 4 MG TOTAL) BY MOUTH DAILY WITH DINNER, Disp: 30 capsule, Rfl: 0    Blood Glucose Monitoring Suppl (FREESTYLE LITE) CHARAN, Use as directed, Disp: , Rfl: 0    metFORMIN (GLUCOPHAGE) 1000 MG tablet, Take 1 tablet (1,000 mg total) by mouth daily with breakfast (Patient not taking: Reported on 1/8/2019 ), Disp: 90 tablet, Rfl: 0    PHARMACIST CHOICE ALCOHOL 70 % PADS, USE 3 TIMES A DAY TO 4 TIMES A DAY, Disp: , Rfl: 11    topiramate (TOPAMAX) 25 mg tablet, By oral route take 1 tablet twice daily or as directed, Disp: 60 tablet, Rfl: 2    Objective:    Blood pressure 128/88, pulse 72, resp  rate 18, height 5' 6 5" (1 689 m), weight 110 kg (243 lb 6 4 oz)  Physical Exam  Head normocephalic  Scar noted left occipital area  No audible head or anterior neck bruits    Lungs clear to auscultation  Rhythm regular  GI (abdomen) soft nontender with bowel sounds present  No significant lower extremity edema  Neurological Exam  Alert  Pleasantly interactive  Fully oriented  Unremarkable spontaneous gait  Able to tandem walk  Romberg maneuver performed unremarkably  Cranial Nerves:   I: Olfactory sense intact bilaterally  II: Visual fields full to confrontation  Pupils equal, round, reactive to light with normal accomodation  Fundus: with bilaterally marginated discs  III,IV,VI: Extraocular muscles EOMI, no nystagmus  V: Masseter and pterygoid strength  Sensation in the V1 through V3 distributions intact to pinprick and light touch bilaterally  VII: Face is symmetric with no weakness noted  VIII: Audition intact to finger rub bilaterally  IX/X: Uvula midline  Soft palate elevation symmetric  XI: Trapezius and SCM strength 5/5 bilaterally  XII: Tongue midline with no atrophy or fasciculations with appropriate movement  Accurate with finger-to-nose and heel-to-shin maneuvers bilaterally  Full symmetrical strength throughout the four extremities with no upper extremity drift  Sensory testing grossly intact to pin, position and vibration throughout  No extinction with double simultaneous stimulation  Muscle stretch reflexes bilaterally 2 throughout the upper extremities, at the knees and at the ankles  Toe response downgoing bilaterally  ROS:    Review of Systems   Constitutional: Negative for appetite change and fever  HENT: Negative  Negative for hearing loss, tinnitus, trouble swallowing and voice change  Eyes: Negative  Negative for photophobia and pain  Respiratory: Negative  Negative for shortness of breath  Cardiovascular: Negative  Negative for palpitations  Gastrointestinal: Negative  Negative for nausea and vomiting  Endocrine: Negative  Negative for cold intolerance and heat intolerance  Genitourinary: Negative    Negative for dysuria, frequency and urgency  Musculoskeletal: Negative  Negative for myalgias and neck pain  Skin: Negative  Negative for rash  Neurological: Positive for weakness (left side of his body) and headaches  Negative for dizziness, tremors, seizures, syncope, facial asymmetry, speech difficulty, light-headedness and numbness  Hematological: Negative  Does not bruise/bleed easily  Psychiatric/Behavioral: Negative  Negative for confusion, hallucinations and sleep disturbance  I personally reviewed the ROS that was entered by the medical assistant  *Please note this document was created using voice recognition software and may contain sound-alike word errors  *

## 2019-01-20 DIAGNOSIS — R33.9 URINARY RETENTION: ICD-10-CM

## 2019-01-21 RX ORDER — TAMSULOSIN HYDROCHLORIDE 0.4 MG/1
0.4 CAPSULE ORAL
Qty: 30 CAPSULE | Refills: 0 | OUTPATIENT
Start: 2019-01-21

## 2019-01-24 DIAGNOSIS — E11.9 CONTROLLED TYPE 2 DIABETES MELLITUS WITHOUT COMPLICATION, WITHOUT LONG-TERM CURRENT USE OF INSULIN (HCC): ICD-10-CM

## 2019-01-29 ENCOUNTER — APPOINTMENT (OUTPATIENT)
Dept: LAB | Facility: HOSPITAL | Age: 49
End: 2019-01-29
Payer: COMMERCIAL

## 2019-01-29 ENCOUNTER — TRANSCRIBE ORDERS (OUTPATIENT)
Dept: ADMINISTRATIVE | Facility: HOSPITAL | Age: 49
End: 2019-01-29

## 2019-01-29 DIAGNOSIS — G44.329 CHRONIC POST-TRAUMATIC HEADACHE, NOT INTRACTABLE: ICD-10-CM

## 2019-01-29 DIAGNOSIS — E55.9 VITAMIN D DEFICIENCY: ICD-10-CM

## 2019-01-29 DIAGNOSIS — M54.50 CHRONIC BILATERAL LOW BACK PAIN WITHOUT SCIATICA: ICD-10-CM

## 2019-01-29 DIAGNOSIS — E11.8 CONTROLLED TYPE 2 DIABETES MELLITUS WITH COMPLICATION, WITHOUT LONG-TERM CURRENT USE OF INSULIN (HCC): ICD-10-CM

## 2019-01-29 DIAGNOSIS — G89.29 CHRONIC BILATERAL LOW BACK PAIN WITHOUT SCIATICA: ICD-10-CM

## 2019-01-29 LAB
25(OH)D3 SERPL-MCNC: 23.3 NG/ML (ref 30–100)
ALBUMIN SERPL BCP-MCNC: 4.1 G/DL (ref 3–5.2)
ALP SERPL-CCNC: 83 U/L (ref 43–122)
ALT SERPL W P-5'-P-CCNC: 53 U/L (ref 9–52)
ANION GAP SERPL CALCULATED.3IONS-SCNC: 8 MMOL/L (ref 5–14)
AST SERPL W P-5'-P-CCNC: 36 U/L (ref 17–59)
BASOPHILS # BLD AUTO: 0.1 THOUSANDS/ΜL (ref 0–0.1)
BASOPHILS NFR BLD AUTO: 1 % (ref 0–1)
BILIRUB SERPL-MCNC: 0.5 MG/DL
BUN SERPL-MCNC: 16 MG/DL (ref 5–25)
CALCIUM SERPL-MCNC: 9.4 MG/DL (ref 8.4–10.2)
CHLORIDE SERPL-SCNC: 104 MMOL/L (ref 97–108)
CHOLEST SERPL-MCNC: 173 MG/DL
CO2 SERPL-SCNC: 27 MMOL/L (ref 22–30)
CREAT SERPL-MCNC: 1.05 MG/DL (ref 0.7–1.5)
CREAT UR-MCNC: 161 MG/DL
EOSINOPHIL # BLD AUTO: 0.2 THOUSAND/ΜL (ref 0–0.4)
EOSINOPHIL NFR BLD AUTO: 5 % (ref 0–6)
ERYTHROCYTE [DISTWIDTH] IN BLOOD BY AUTOMATED COUNT: 14.6 %
EST. AVERAGE GLUCOSE BLD GHB EST-MCNC: 169 MG/DL
GFR SERPL CREATININE-BSD FRML MDRD: 84 ML/MIN/1.73SQ M
GLUCOSE P FAST SERPL-MCNC: 198 MG/DL (ref 70–99)
HBA1C MFR BLD: 7.5 % (ref 4.2–6.3)
HCT VFR BLD AUTO: 43.1 % (ref 41–53)
HDLC SERPL-MCNC: 32 MG/DL (ref 40–59)
HGB BLD-MCNC: 13.8 G/DL (ref 13.5–17.5)
LDLC SERPL CALC-MCNC: 122 MG/DL
LYMPHOCYTES # BLD AUTO: 1.7 THOUSANDS/ΜL (ref 0.5–4)
LYMPHOCYTES NFR BLD AUTO: 38 % (ref 25–45)
MCH RBC QN AUTO: 26.3 PG (ref 26–34)
MCHC RBC AUTO-ENTMCNC: 32 G/DL (ref 31–36)
MCV RBC AUTO: 82 FL (ref 80–100)
MICROALBUMIN UR-MCNC: 19.2 MG/L (ref 0–20)
MICROALBUMIN/CREAT 24H UR: 12 MG/G CREATININE (ref 0–30)
MONOCYTES # BLD AUTO: 0.3 THOUSAND/ΜL (ref 0.2–0.9)
MONOCYTES NFR BLD AUTO: 7 % (ref 1–10)
NEUTROPHILS # BLD AUTO: 2.2 THOUSANDS/ΜL (ref 1.8–7.8)
NEUTS SEG NFR BLD AUTO: 48 % (ref 45–65)
NONHDLC SERPL-MCNC: 141 MG/DL
PLATELET # BLD AUTO: 250 THOUSANDS/UL (ref 150–450)
PMV BLD AUTO: 8.1 FL (ref 8.9–12.7)
POTASSIUM SERPL-SCNC: 4 MMOL/L (ref 3.6–5)
PROT SERPL-MCNC: 7.7 G/DL (ref 5.9–8.4)
RBC # BLD AUTO: 5.25 MILLION/UL (ref 4.5–5.9)
SODIUM SERPL-SCNC: 139 MMOL/L (ref 137–147)
TRIGL SERPL-MCNC: 93 MG/DL
TSH SERPL DL<=0.05 MIU/L-ACNC: 2.08 UIU/ML (ref 0.47–4.68)
WBC # BLD AUTO: 4.5 THOUSAND/UL (ref 4.5–11)

## 2019-01-29 PROCEDURE — 36415 COLL VENOUS BLD VENIPUNCTURE: CPT | Performed by: FAMILY MEDICINE

## 2019-01-29 PROCEDURE — 85025 COMPLETE CBC W/AUTO DIFF WBC: CPT

## 2019-01-29 PROCEDURE — 80053 COMPREHEN METABOLIC PANEL: CPT

## 2019-01-29 PROCEDURE — 80061 LIPID PANEL: CPT

## 2019-01-29 PROCEDURE — 82570 ASSAY OF URINE CREATININE: CPT

## 2019-01-29 PROCEDURE — 82043 UR ALBUMIN QUANTITATIVE: CPT

## 2019-01-29 PROCEDURE — 83036 HEMOGLOBIN GLYCOSYLATED A1C: CPT

## 2019-01-29 PROCEDURE — 84443 ASSAY THYROID STIM HORMONE: CPT

## 2019-01-29 PROCEDURE — 82306 VITAMIN D 25 HYDROXY: CPT | Performed by: FAMILY MEDICINE

## 2019-01-29 PROCEDURE — 3061F NEG MICROALBUMINURIA REV: CPT | Performed by: FAMILY MEDICINE

## 2019-01-30 DIAGNOSIS — E55.9 VITAMIN D DEFICIENCY: Primary | ICD-10-CM

## 2019-01-30 RX ORDER — ERGOCALCIFEROL 1.25 MG/1
50000 CAPSULE ORAL WEEKLY
Qty: 12 CAPSULE | Refills: 0 | Status: SHIPPED | OUTPATIENT
Start: 2019-01-30 | End: 2019-02-13 | Stop reason: ALTCHOICE

## 2019-01-30 NOTE — TELEPHONE ENCOUNTER
Pt aware   rx sent for sign off       ----- Message from Jamal Flower MD sent at 1/30/2019  1:11 PM EST -----  Start Vit D 11922 iu/w for 12 w

## 2019-02-01 DIAGNOSIS — R33.9 URINARY RETENTION: ICD-10-CM

## 2019-02-02 DIAGNOSIS — R33.9 URINARY RETENTION: ICD-10-CM

## 2019-02-04 RX ORDER — TAMSULOSIN HYDROCHLORIDE 0.4 MG/1
0.4 CAPSULE ORAL
Qty: 30 CAPSULE | Refills: 0 | OUTPATIENT
Start: 2019-02-04

## 2019-02-13 ENCOUNTER — OFFICE VISIT (OUTPATIENT)
Dept: FAMILY MEDICINE CLINIC | Facility: CLINIC | Age: 49
End: 2019-02-13
Payer: COMMERCIAL

## 2019-02-13 ENCOUNTER — TELEPHONE (OUTPATIENT)
Dept: FAMILY MEDICINE CLINIC | Facility: CLINIC | Age: 49
End: 2019-02-13

## 2019-02-13 VITALS
DIASTOLIC BLOOD PRESSURE: 90 MMHG | WEIGHT: 250 LBS | HEART RATE: 71 BPM | OXYGEN SATURATION: 96 % | BODY MASS INDEX: 40.18 KG/M2 | SYSTOLIC BLOOD PRESSURE: 120 MMHG | HEIGHT: 66 IN | TEMPERATURE: 98.3 F

## 2019-02-13 DIAGNOSIS — E55.9 VITAMIN D DEFICIENCY: ICD-10-CM

## 2019-02-13 DIAGNOSIS — E78.5 DYSLIPIDEMIA, GOAL LDL BELOW 70: ICD-10-CM

## 2019-02-13 DIAGNOSIS — Z23 NEED FOR PNEUMOCOCCAL VACCINATION: ICD-10-CM

## 2019-02-13 DIAGNOSIS — E11.8 CONTROLLED TYPE 2 DIABETES MELLITUS WITH COMPLICATION, WITHOUT LONG-TERM CURRENT USE OF INSULIN (HCC): Primary | ICD-10-CM

## 2019-02-13 DIAGNOSIS — E66.01 MORBID OBESITY (HCC): ICD-10-CM

## 2019-02-13 DIAGNOSIS — G89.29 CHRONIC BILATERAL LOW BACK PAIN WITHOUT SCIATICA: ICD-10-CM

## 2019-02-13 DIAGNOSIS — M54.50 CHRONIC BILATERAL LOW BACK PAIN WITHOUT SCIATICA: ICD-10-CM

## 2019-02-13 PROCEDURE — 99214 OFFICE O/P EST MOD 30 MIN: CPT | Performed by: FAMILY MEDICINE

## 2019-02-13 PROCEDURE — G0009 ADMIN PNEUMOCOCCAL VACCINE: HCPCS | Performed by: FAMILY MEDICINE

## 2019-02-13 PROCEDURE — 90732 PPSV23 VACC 2 YRS+ SUBQ/IM: CPT | Performed by: FAMILY MEDICINE

## 2019-02-13 RX ORDER — ERGOCALCIFEROL (VITAMIN D2) 50 MCG
1 CAPSULE ORAL DAILY
Qty: 30 CAPSULE | Refills: 1 | Status: SHIPPED | OUTPATIENT
Start: 2019-02-13 | End: 2019-09-09 | Stop reason: SDUPTHER

## 2019-02-13 RX ORDER — ATORVASTATIN CALCIUM 20 MG/1
20 TABLET, FILM COATED ORAL DAILY
Qty: 30 TABLET | Refills: 2 | Status: SHIPPED | OUTPATIENT
Start: 2019-02-13 | End: 2019-05-21 | Stop reason: SDUPTHER

## 2019-02-13 NOTE — TELEPHONE ENCOUNTER
Left message at [facility] [phone] for patients most recent diabetic eye exam to be faxed to office

## 2019-02-13 NOTE — PATIENT INSTRUCTIONS
Obesity   AMBULATORY CARE:   Obesity  is when your body mass index (BMI) is greater than 30  Your healthcare provider will use your height and weight to measure your BMI  The risks of obesity include  many health problems, such as injuries or physical disability  You may need tests to check for the following:  · Diabetes     · High blood pressure or high cholesterol     · Heart disease     · Gallbladder or liver disease     · Cancer of the colon, breast, prostate, liver, or kidney     · Sleep apnea     · Arthritis or gout  Seek care immediately if:   · You have a severe headache, confusion, or difficulty speaking  · You have weakness on one side of your body  · You have chest pain, sweating, or shortness of breath  Contact your healthcare provider if:   · You have symptoms of gallbladder or liver disease, such as pain in your upper abdomen  · You have knee or hip pain and discomfort while walking  · You have symptoms of diabetes, such as intense hunger and thirst, and frequent urination  · You have symptoms of sleep apnea, such as snoring or daytime sleepiness  · You have questions or concerns about your condition or care  Treatment for obesity  focuses on helping you lose weight to improve your health  Even a small decrease in BMI can reduce the risk for many health problems  Your healthcare provider will help you set a weight-loss goal   · Lifestyle changes  are the first step in treating obesity  These include making healthy food choices and getting regular physical activity  Your healthcare provider may suggest a weight-loss program that involves coaching, education, and therapy  · Medicine  may help you lose weight when it is used with a healthy diet and physical activity  · Surgery  can help you lose weight if you are very obese and have other health problems  There are several types of weight-loss surgery  Ask your healthcare provider for more information    Be successful losing weight:   · Set small, realistic goals  An example of a small goal is to walk for 20 minutes 5 days a week  Anther goal is to lose 5% of your body weight  · Tell friends, family members, and coworkers about your goals  and ask for their support  Ask a friend to lose weight with you, or join a weight-loss support group  · Identify foods or triggers that may cause you to overeat , and find ways to avoid them  Remove tempting high-calorie foods from your home and workplace  Place a bowl of fresh fruit on your kitchen counter  If stress causes you to eat, then find other ways to cope with stress  · Keep a diary to track what you eat and drink  Also write down how many minutes of physical activity you do each day  Weigh yourself once a week and record it in your diary  Eating changes: You will need to eat 500 to 1,000 fewer calories each day than you currently eat to lose 1 to 2 pounds a week  The following changes will help you cut calories:  · Eat smaller portions  Use small plates, no larger than 9 inches in diameter  Fill your plate half full of fruits and vegetables  Measure your food using measuring cups until you know what a serving size looks like  · Eat 3 meals and 1 or 2 snacks each day  Plan your meals in advance  [employer]on [employer] and eat at home most of the time  Eat slowly  · Eat fruits and vegetables at every meal   They are low in calories and high in fiber, which makes you feel full  Do not add butter, margarine, or cream sauce to vegetables  Use herbs to season steamed vegetables  · Eat less fat and fewer fried foods  Eat more baked or grilled chicken and fish  These protein sources are lower in calories and fat than red meat  Limit fast food  Dress your salads with olive oil and vinegar instead of bottled dressing  · Limit the amount of sugar you eat  Do not drink sugary beverages  Limit alcohol  Activity changes:  Physical activity is good for your body in many ways   It helps you burn calories and build strong muscles  It decreases stress and depression, and improves your mood  It can also help you sleep better  Talk to your healthcare provider before you begin an exercise program   · Exercise for at least 30 minutes 5 days a week  Start slowly  Set aside time each day for physical activity that you enjoy and that is convenient for you  It is best to do both weight training and an activity that increases your heart rate, such as walking, bicycling, or swimming  · Find ways to be more active  Do yard work and housecleaning  Walk up the stairs instead of using elevators  Spend your leisure time going to events that require walking, such as outdoor festivals or fairs  This extra physical activity can help you lose weight and keep it off  Follow up with your healthcare provider as directed: You may need to meet with a dietitian  Write down your questions so you remember to ask them during your visits  © 2017 [employer] Information is for End User's use only and may not be sold, redistributed or otherwise used for commercial purposes  All illustrations and images included in CareNotes® are the copyrighted property of A [employer] M [employer]  or Adarsh Manzo  The above information is an  only  It is not intended as medical advice for individual conditions or treatments  Talk to your doctor, nurse or pharmacist before following any medical regimen to see if it is safe and effective for you

## 2019-02-13 NOTE — PROGRESS NOTES
Subjective:   Chief Complaint   Patient presents with    Follow-up     chronic conditions        Patient ID: Tata Curry is a 50 y o  male  Patient here follow-up with a chronic condition and concerned about the back pain he describes his low back pain as a achy and 6/10 localized in the lumbar spine shooting to his lower extremity worse in the right side pain go below-the-knee and no fall no trauma he had chronic back pain and then was exacerbation and no fever no lose control of the urine or stool and no constipation no blood in the urine  Patient's history of non-insulin-dependent diabetic he is on metformin 1000 twice a day patient is supposed to be on Januvia 100 milligram once a day patient has not been taking it in daily basis he deny any increased thirsty increased frequency urination no headache no dizziness and no blurred vision patient already on statin patient's history of vitamin-D deficiency and he is not on any supplement deny any joint pain bone pain and no muscle pain recent blood work discussed with the patient      The following portions of the patient's history were reviewed and updated as appropriate: allergies, current medications, past family history, past medical history, past social history, past surgical history and problem list     Review of Systems   Constitutional: Negative for fatigue and fever  HENT: Negative for ear pain, sinus pressure, sinus pain and sore throat  Eyes: Negative for pain and redness  Respiratory: Negative for cough, chest tightness and shortness of breath  Cardiovascular: Negative for chest pain, palpitations and leg swelling  Gastrointestinal: Negative for abdominal pain, blood in stool, constipation, diarrhea and nausea  Genitourinary: Negative for flank pain, frequency and hematuria  Musculoskeletal: Positive for back pain  Negative for joint swelling  Skin: Negative for rash     Neurological: Negative for dizziness, numbness and headaches  Hematological: Does not bruise/bleed easily  Objective:  Vitals:    02/13/19 1342   BP: 120/90   Pulse: 71   Temp: 98 3 °F (36 8 °C)   TempSrc: Oral   SpO2: 96%   Weight: 113 kg (250 lb)   Height: 5' 6" (1 676 m)      Physical Exam   Constitutional: He is oriented to person, place, and time  He appears well-developed and well-nourished  HENT:   Head: Normocephalic  Right Ear: External ear normal    Left Ear: External ear normal    Eyes: Conjunctivae and EOM are normal  Right eye exhibits no discharge  Left eye exhibits no discharge  Neck: No JVD present  Cardiovascular: Normal rate, regular rhythm and normal heart sounds  Exam reveals no gallop  Pulses are no weak pulses  No murmur heard  Pulses:       Dorsalis pedis pulses are 2+ on the right side, and 2+ on the left side  Pulmonary/Chest: Effort normal  No respiratory distress  He has no wheezes  He has no rales  He exhibits no tenderness  Abdominal: He exhibits no mass  There is no tenderness  There is no rebound  Musculoskeletal: He exhibits tenderness  He exhibits no edema  The positive tenderness in the lumbar spine leg raise test is positive  right side   Feet:   Right Foot:   Skin Integrity: Negative for warmth  Left Foot:   Skin Integrity: Negative for warmth  Neurological: He is alert and oriented to person, place, and time  Skin: No rash noted  No erythema  Patient's shoes and socks removed  Right Foot/Ankle   Right Foot Inspection  Skin Exam: skin intact no warmth and no pre-ulcer                          Toe Exam: no swelling and erythema  Sensory       Monofilament testing: intact  Vascular  Capillary refills: < 3 seconds  The right DP pulse is 2+       Left Foot/Ankle  Left Foot Inspection  Skin Exam: skin intactno warmth and no pre-ulcer                         Toe Exam: no swelling and no erythema                   Sensory       Monofilament: intact  Vascular  Capillary refills: < 3 seconds  The left DP pulse is 2+  Assign Risk Category:  No deformity present; No loss of protective sensation; No weak pulses       Risk: 0    Assessment/Plan:    Diabetes mellitus type II, controlled ([facility] )  Lab Results   Component Value Date    HGBA1C 7 5 (H) 01/29/2019      a chronic asymptomatic increase in the hemoglobin A1c to 7 5 patient on metformin 1000 milligram twice a day and he is not compliant with taking the Januvia 100 mg daily the we encouraged patient to be compliant with the medication to control blood sugar c prevent complication also we discussed cost with the patient important lose weight and follow up with the low carb diet patient already on Ace inhibitor and no microalbuminuria and he is also on statin    Vitamin D deficiency  Chronic uncontrolled start patient on vitamin-D 2000 International Units once a day proper use of medication possible side effect discussed with the patient vitamin-D rich diet discussed with the patient    Dyslipidemia, goal LDL below 70  Lipid profile sub therapeutic for diabetic patient the plan to increase the atorvastatin to 20 milligram once a day the proper use of medication possible side effect discussed with the patient also discussed with the patient important lose weight and low fat diet    Chronic back pain  The chronic asymptomatic and will recommend to continue with the Mobic 15 milligram once a day proper use of medication possible side effect discussed with the patient we discussed with the patient important lose weight the plan for x-ray of the lumbar spine       Diagnoses and all orders for this visit:    Controlled type 2 diabetes mellitus with complication, without long-term current use of insulin (HCC)  -     CBC and differential; Future  -     Comprehensive metabolic panel; Future  -     Lipid Panel with Direct LDL reflex; Future  -     TSH, 3rd generation with Free T4 reflex;  Future  -     Hemoglobin A1C; Future    Vitamin D deficiency  -     Vitamin D, Ergocalciferol, 2000 units CAPS; Take 1 tablet by mouth daily  -     CBC and differential; Future  -     Comprehensive metabolic panel; Future  -     Lipid Panel with Direct LDL reflex; Future  -     TSH, 3rd generation with Free T4 reflex; Future  -     Hemoglobin A1C; Future    Dyslipidemia, goal LDL below 70  -     atorvastatin (LIPITOR) 20 mg tablet; Take 1 tablet (20 mg total) by mouth daily  -     CBC and differential; Future  -     Comprehensive metabolic panel; Future  -     Lipid Panel with Direct LDL reflex; Future  -     TSH, 3rd generation with Free T4 reflex; Future  -     Hemoglobin A1C; Future    Morbid obesity (HCC)  -     CBC and differential; Future  -     Comprehensive metabolic panel; Future  -     Lipid Panel with Direct LDL reflex; Future  -     TSH, 3rd generation with Free T4 reflex; Future  -     Hemoglobin A1C; Future    Chronic bilateral low back pain without sciatica    Need for pneumococcal vaccination  -     PNEUMOCOCCAL POLYSACCHARIDE VACCINE 23-VALENT =>3YO SQ IM          BMI Counseling: Body mass index is 40 35 kg/m²  Discussed the patient's BMI with him  The BMI is above average  BMI counseling and education was provided to the patient  Nutrition recommendations include reducing portion sizes, decreasing overall calorie intake, 3-5 servings of fruits/vegetables daily, reducing fast food intake, consuming healthier snacks, decreasing soda and/or juice intake, moderation in carbohydrate intake and reducing intake of cholesterol  Exercise recommendations include moderate aerobic physical activity for 150 minutes/week

## 2019-02-16 RX ORDER — SITAGLIPTIN 100 MG/1
100 TABLET, FILM COATED ORAL EVERY MORNING
Qty: 30 TABLET | Refills: 2 | Status: SHIPPED | OUTPATIENT
Start: 2019-02-16 | End: 2019-05-21 | Stop reason: SDUPTHER

## 2019-02-16 NOTE — ASSESSMENT & PLAN NOTE
Lab Results   Component Value Date    HGBA1C 7 5 (H) 01/29/2019      a chronic asymptomatic increase in the hemoglobin A1c to 7 5 patient on metformin 1000 milligram twice a day and he is not compliant with taking the Januvia 100 mg daily the we encouraged patient to be compliant with the medication to control blood sugar c prevent complication also we discussed cost with the patient important lose weight and follow up with the low carb diet patient already on Ace inhibitor and no microalbuminuria and he is also on statin

## 2019-02-16 NOTE — ASSESSMENT & PLAN NOTE
Lipid profile sub therapeutic for diabetic patient the plan to increase the atorvastatin to 20 milligram once a day the proper use of medication possible side effect discussed with the patient also discussed with the patient important lose weight and low fat diet

## 2019-02-16 NOTE — ASSESSMENT & PLAN NOTE
Chronic uncontrolled start patient on vitamin-D 2000 International Units once a day proper use of medication possible side effect discussed with the patient vitamin-D rich diet discussed with the patient

## 2019-02-16 NOTE — ASSESSMENT & PLAN NOTE
The chronic asymptomatic and will recommend to continue with the Mobic 15 milligram once a day proper use of medication possible side effect discussed with the patient we discussed with the patient important lose weight the plan for x-ray of the lumbar spine

## 2019-03-05 ENCOUNTER — OFFICE VISIT (OUTPATIENT)
Dept: FAMILY MEDICINE CLINIC | Facility: CLINIC | Age: 49
End: 2019-03-05
Payer: COMMERCIAL

## 2019-03-05 ENCOUNTER — TELEPHONE (OUTPATIENT)
Dept: NEUROLOGY | Facility: CLINIC | Age: 49
End: 2019-03-05

## 2019-03-05 ENCOUNTER — HOSPITAL ENCOUNTER (OUTPATIENT)
Dept: RADIOLOGY | Facility: HOSPITAL | Age: 49
Discharge: HOME | End: 2019-03-05
Payer: COMMERCIAL

## 2019-03-05 VITALS
SYSTOLIC BLOOD PRESSURE: 130 MMHG | WEIGHT: 250 LBS | HEART RATE: 80 BPM | TEMPERATURE: 97.3 F | DIASTOLIC BLOOD PRESSURE: 80 MMHG | HEIGHT: 65 IN | BODY MASS INDEX: 41.65 KG/M2 | OXYGEN SATURATION: 97 %

## 2019-03-05 DIAGNOSIS — M54.50 CHRONIC BILATERAL LOW BACK PAIN WITHOUT SCIATICA: Primary | ICD-10-CM

## 2019-03-05 DIAGNOSIS — E11.65 CONTROLLED TYPE 2 DIABETES MELLITUS WITH HYPERGLYCEMIA, WITHOUT LONG-TERM CURRENT USE OF INSULIN (HCC): ICD-10-CM

## 2019-03-05 DIAGNOSIS — M54.50 CHRONIC BILATERAL LOW BACK PAIN WITHOUT SCIATICA: ICD-10-CM

## 2019-03-05 DIAGNOSIS — E11.9 CONTROLLED TYPE 2 DIABETES MELLITUS WITHOUT COMPLICATION, WITHOUT LONG-TERM CURRENT USE OF INSULIN (HCC): ICD-10-CM

## 2019-03-05 DIAGNOSIS — G89.29 CHRONIC BILATERAL LOW BACK PAIN WITHOUT SCIATICA: Primary | ICD-10-CM

## 2019-03-05 DIAGNOSIS — G89.29 CHRONIC BILATERAL LOW BACK PAIN WITHOUT SCIATICA: ICD-10-CM

## 2019-03-05 PROCEDURE — 99213 OFFICE O/P EST LOW 20 MIN: CPT | Performed by: FAMILY MEDICINE

## 2019-03-05 PROCEDURE — 3008F BODY MASS INDEX DOCD: CPT | Performed by: FAMILY MEDICINE

## 2019-03-05 PROCEDURE — 72110 X-RAY EXAM L-2 SPINE 4/>VWS: CPT

## 2019-03-05 RX ORDER — LANCETS 28 GAUGE
EACH MISCELLANEOUS 3 TIMES DAILY
Qty: 100 EACH | Refills: 1 | Status: SHIPPED | OUTPATIENT
Start: 2019-03-05 | End: 2019-10-28 | Stop reason: ALTCHOICE

## 2019-03-05 RX ORDER — GABAPENTIN 300 MG/1
300 CAPSULE ORAL DAILY
Qty: 30 CAPSULE | Refills: 2 | Status: SHIPPED | OUTPATIENT
Start: 2019-03-05 | End: 2019-05-21 | Stop reason: SDUPTHER

## 2019-03-05 RX ORDER — BLOOD-GLUCOSE METER
1 KIT MISCELLANEOUS 3 TIMES DAILY
Qty: 1 EACH | Refills: 0 | Status: SHIPPED | OUTPATIENT
Start: 2019-03-05

## 2019-03-05 NOTE — PROGRESS NOTES
Subjective:   Chief Complaint   Patient presents with    Back Pain     low        Patient ID: Veronica Gifford is a 50 y o  male  Patient is here for low back pain in patient with history of chronic back pain,his pain is achy 7/10 radiate to lower extremity worse on Right side ,with numbness ,no lose control of urine or stool no recent trauma ,no fever no change on eight ,no responding to current med      The following portions of the patient's history were reviewed and updated as appropriate: allergies, current medications, past family history, past medical history, past social history, past surgical history and problem list     Review of Systems   Constitutional: Negative for fatigue and fever  HENT: Negative for ear pain, sinus pressure, sinus pain and sore throat  Eyes: Negative for pain and redness  Respiratory: Negative for cough, chest tightness and shortness of breath  Cardiovascular: Negative for chest pain, palpitations and leg swelling  Gastrointestinal: Negative for abdominal pain, blood in stool, constipation, diarrhea and nausea  Genitourinary: Negative for flank pain, frequency and hematuria  Musculoskeletal: Positive for back pain  Negative for joint swelling  Skin: Negative for rash  Neurological: Negative for dizziness, numbness and headaches  Hematological: Does not bruise/bleed easily  Objective:  Vitals:    03/05/19 0900   BP: 130/80   Pulse: 80   Temp: (!) 97 3 °F (36 3 °C)   TempSrc: Oral   SpO2: 97%   Weight: 113 kg (250 lb)   Height: 5' 5" (1 651 m)      Physical Exam   Constitutional: He is oriented to person, place, and time  He appears well-developed and well-nourished  HENT:   Head: Normocephalic  Right Ear: External ear normal    Left Ear: External ear normal    Eyes: Conjunctivae and EOM are normal  Right eye exhibits no discharge  Left eye exhibits no discharge  Neck: No JVD present     Cardiovascular: Normal rate, regular rhythm and normal heart sounds  Exam reveals no gallop  No murmur heard  Pulmonary/Chest: Effort normal  No respiratory distress  He has no wheezes  He has no rales  He exhibits no tenderness  Abdominal: He exhibits no mass  There is no tenderness  There is no rebound  Musculoskeletal: He exhibits tenderness  He exhibits no edema  positive tenderness in lumbar spine He raise test is positive B/L lower extremity   Neurological: He is alert and oriented to person, place, and time  Skin: No rash noted  No erythema  Assessment/Plan:  Break away    Chronic back pain  The no Acute on chronic ,start Gabapentin 300 mg po qhs ,proper use med discuss with patient   Will refer to physical therapy ,Xray of lumbar spine       Diagnoses and all orders for this visit:    Chronic bilateral low back pain without sciatica  -     gabapentin (NEURONTIN) 300 mg capsule; Take 1 capsule (300 mg total) by mouth daily  -     XR spine lumbar minimum 4 views non injury; Future  -     Ambulatory referral to Physical Therapy;  Future    Controlled type 2 diabetes mellitus with hyperglycemia, without long-term current use of insulin ([facility])  -     glucose monitoring kit (FREESTYLE) monitoring kit; 1 each by Does not apply route 3 (three) times a day    Controlled type 2 diabetes mellitus without complication, without long-term current use of insulin ([facility])  -     glucose blood (FREESTYLE LITE) test strip; Use trip to test blood sugar daily   -     Lancets (FREESTYLE) lancets; by Other route 3 (three) times a day Use as instructed

## 2019-03-05 NOTE — TELEPHONE ENCOUNTER
Left message for patient regarding appointment this morning that he no showed for with neurology office  No show letter will also be mailed to address in patient chart

## 2019-03-06 NOTE — ASSESSMENT & PLAN NOTE
The no Acute on chronic ,start Gabapentin 300 mg po qhs ,proper use med discuss with patient   Will refer to physical therapy ,Xray of lumbar spine

## 2019-03-12 ENCOUNTER — EVALUATION (OUTPATIENT)
Dept: PHYSICAL THERAPY | Facility: CLINIC | Age: 49
End: 2019-03-12
Payer: COMMERCIAL

## 2019-03-12 DIAGNOSIS — M54.50 CHRONIC BILATERAL LOW BACK PAIN WITHOUT SCIATICA: Primary | ICD-10-CM

## 2019-03-12 DIAGNOSIS — G89.29 CHRONIC BILATERAL LOW BACK PAIN WITHOUT SCIATICA: Primary | ICD-10-CM

## 2019-03-12 PROCEDURE — 97161 PT EVAL LOW COMPLEX 20 MIN: CPT

## 2019-03-12 PROCEDURE — 97110 THERAPEUTIC EXERCISES: CPT | Performed by: PHYSICAL MEDICINE & REHABILITATION

## 2019-03-12 NOTE — PROGRESS NOTES
PT Evaluation     Today's date: 3/12/2019  Patient name: Enio Neri  : 1970  MRN: [id number]  Referring provider: Isabella Rai MD  Dx:   Encounter Diagnosis     ICD-10-CM    1  Chronic bilateral low back pain without sciatica M54 5 Ambulatory referral to Physical Therapy    G89 29                   Assessment  Assessment details: Enio Neri is a 50 y o  Male presenting to physical therapy with complaints of 9/10 low back pain secondary to lumbar hypomobility consistent with lumbar paraspinal hypertonicity and tenderness  Pt presents with gross LE weakness  Pt reports decreased activity tolerance and states difficulty with walking, sitting, standing, bending activities  Pt demo decreased ROM in lumbar spine and hips  Pt has had relief for current condition through physical therapy in the past  May benefit from skilled PT services to decrease pain levels and improve activity tolerance  Pt to benefit from education re: managing back pain and healthy activities to reduce recurrence of condition  Pt was unable or unwilling to assume some of the testing positions, modifications were made when able, other assessments to be performed as needed  Impairments: abnormal muscle tone, abnormal or restricted ROM, activity intolerance, impaired physical strength and pain with function  Understanding of Dx/Px/POC: good   Prognosis: fair    Goals  ST  Pt report improved lumbar ROM by 25% in 3 weeks   2  Pt able to walk and sit >10 minutes with pain <2/10 in 3 weeks   3  Pt able to bend over to don/doff sneakers and clothes with pain <2/10 in 3 weeks per pt report    LT  Pt able to sit/walk >1 hour with pain <1/10 upon discharge per pt report   2  Pt to achieve anticipated FOTO score upon discharge  3  Pt demo independence with HEP upon discharge      Plan  Patient would benefit from: PT eval and skilled physical therapy  Planned modality interventions: thermotherapy: hydrocollator packs, biofeedback, cryotherapy and TENS  Planned therapy interventions: stretching, therapeutic exercise, home exercise program, therapeutic activities, patient education, postural training, manual therapy, joint mobilization, neuromuscular re-education, balance, abdominal trunk stabilization and gait training  Frequency: 2x week  Duration in visits: 8  Duration in weeks: 4  Treatment plan discussed with: patient        Subjective Evaluation    History of Present Illness  Mechanism of injury: 3/12  Pt complains of recurring back pain over an extended period of time  He has had three previous physical therapy episodes for this condition which have provided relief, however, back pain has recently been exacerbated through pt report of non-compliance with discharge HEP and "sitting around and watching television all day"  Pt classifies back pain as 9/10, localized to low back  Symptom aggravators include: sweeping, ambulating stairs, bending over, walking and sitting for more than a few minutes  Pt states that he recently moved to a new location which has 3 stairs to enter without a hand rail  Pt reports that he has to bump up the stairs using hands on the stairs  Pt requires assistance of his girlfriend to don/doff sneakers and clothes  Pt reports sleeping disturbances with movement and has great difficulty trying to find a good sleeping position  The only relieving factors for back pain has been past physical therapy services  States that he doesn't have access to heating and ice modalities at home and would like to gain understanding of how to manage back pain at home and to be eventually able to start an exercise program  Pt denies red flag questions and reports MD visit last week in which he has received medications, x-ray, and a new prescription for physical therapy services            Recurrent probem    Pain  Current pain ratin  At best pain ratin  At worst pain rating: 10  Location: low back   Quality: throbbing and pressure  Relieving factors: medications  Aggravating factors: stair climbing and walking  Progression: worsening    Social Support  Steps to enter house: yes    Employment status: not working    Diagnostic Tests  X-ray: normal  Treatments  Previous treatment: physical therapy and medication  Patient Goals  Patient goals for therapy: decreased pain and independence with ADLs/IADLs  Patient goal: Start an exercise program         Objective     Concurrent Complaints  Negative for bladder dysfunction, bowel dysfunction and saddle (S4) numbness    Static Posture     Head  Forward  Shoulders  Rounded  Palpation   Left   Hypertonic in the erector spinae and lumbar paraspinals  Tenderness of the erector spinae, lumbar paraspinals and quadratus lumborum  Right   Hypertonic in the erector spinae and lumbar paraspinals  Tenderness of the erector spinae, lumbar paraspinals and quadratus lumborum  Active Range of Motion     Lumbar   Flexion: 40 degrees  with pain  Extension: 5 degrees  with pain  Left lateral flexion: 5 degrees    with pain  Right lateral flexion: 15 degrees  with pain    Additional Active Range of Motion Details  Aberrant motion    Passive Range of Motion   Left Hip   Flexion: with pain  External rotation (90/90): 35 degrees with pain    Right Hip   Flexion: with pain  External rotation (90/90): 36 degrees with pain    Additional Passive Range of Motion Details  Voluntarily guarding present during passive hip ROM testing; range limited by pt perception of pain;  Hip flexion <90 degrees     Joint Play     Hypomobile: L1, L2, L3 and L4     Pain: L1, L2, L3 and L4     Strength/Myotome Testing     Left Hip   Planes of Motion   Flexion: 4-  Extension: 3+  External rotation: 4  Internal rotation: 4    Right Hip   Planes of Motion   Flexion: 4  Extension: 4  External rotation: 4  Internal rotation: 4    Left Knee   Flexion: 4  Extension: 4    Right Knee   Flexion: 4  Extension: 4    Left Ankle/Foot Dorsiflexion: 4    Right Ankle/Foot   Dorsiflexion: 4    Tests     Lumbar     Left   Positive passive SLR  Negative crossed SLR and femoral stretch  Right   Positive passive SLR  Negative crossed SLR and femoral stretch  Additional Tests Details  Ely's test: Positive RLE   Pt report pain in posterior chain in ipsilateral LE with bilateral passive SLR ROM testing, pt guarded throughout SLR     [place] sign (+)    Functional Assessment        Comments  Sit to stand: R UE assist, slow and painful    General Comments:      Hip Comments   Pt report reproduction of back pain with passive B hip flexion, IR, and ER            Precautions: HTN    Daily Treatment Diary     Manual              IASTM c motion in seated                                                                     Exercise Diary              MHP warmup             R  bike             SKC             LTR             UTR             Thoracic extension in sitting             Seated QL stretch             Pball roll outs              TrA             SLR (flex/abd/ext)(when tolerated)             Marching standing             Seated hamstring stretch             Cat/Cow (when tolerated)                                                                                                            Modalities              MHP             TENS

## 2019-03-14 ENCOUNTER — OFFICE VISIT (OUTPATIENT)
Dept: PHYSICAL THERAPY | Facility: CLINIC | Age: 49
End: 2019-03-14
Payer: COMMERCIAL

## 2019-03-14 DIAGNOSIS — G89.29 CHRONIC BILATERAL LOW BACK PAIN WITHOUT SCIATICA: Primary | ICD-10-CM

## 2019-03-14 DIAGNOSIS — M54.50 CHRONIC BILATERAL LOW BACK PAIN WITHOUT SCIATICA: Primary | ICD-10-CM

## 2019-03-14 PROCEDURE — 97110 THERAPEUTIC EXERCISES: CPT

## 2019-03-14 NOTE — PROGRESS NOTES
Daily Note     Today's date: 3/14/2019  Patient name: Talya Lane  : 1970  MRN: [id number]  Referring provider: Peter Macdonald MD  Dx:   Encounter Diagnosis     ICD-10-CM    1  Chronic bilateral low back pain without sciatica M54 5     G89 29        Start Time: 1140  Stop Time: 1225  Total time in clinic (min): 45 minutes    Subjective: Pt presents to PT at incorrect time, informed Pt of correct time and Pt stated he would return  There was no gait dysfunction noted while ambulating out of office  Pt returned to PT for correctly scheduled time and presented with moderated guarding throughout C spine and thoracic spine as well as an antalgic gait when ambulating from waiting room to therapy clinic  Pt reports he is feeling "horrible" and provides a pain rating of a 9-10/10  Objective: See treatment diary below    Precautions: HTN    Daily Treatment Diary     Manual  3/14            IASTM c motion in seated np                                                                    Exercise Diary  3/14            MHP warmup 10'            R  bike 5'            SKC 10x10"            LTR 10x10"            UTR             Thoracic extension in sitting 10x10"            Seated QL stretch 10x10"            Pball roll outs  10x10"            TrA nv            SLR (flex/abd/ext)(when tolerated) nv            Marching standing np            Seated hamstring stretch 3x30"            Cat/Cow (when tolerated) np                                                                                                           Modalities  3/14            MHP 10'            TENS                                Assessment: Tolerated treatment fair  Patient demonstrated fatigue post treatment and would benefit from continued PT  Pt performed exercises as noted with minimal reports of change in symptoms  Initiate exercises not performed today and attempt Manuals   Pt will benefit from further skilled PT to increase strength, flexibility and function  Continue to progress as able  Plan: Continue per plan of care

## 2019-03-20 ENCOUNTER — OFFICE VISIT (OUTPATIENT)
Dept: PHYSICAL THERAPY | Facility: CLINIC | Age: 49
End: 2019-03-20
Payer: COMMERCIAL

## 2019-03-20 DIAGNOSIS — M54.50 CHRONIC BILATERAL LOW BACK PAIN WITHOUT SCIATICA: Primary | ICD-10-CM

## 2019-03-20 DIAGNOSIS — G89.29 CHRONIC BILATERAL LOW BACK PAIN WITHOUT SCIATICA: Primary | ICD-10-CM

## 2019-03-20 PROCEDURE — 97110 THERAPEUTIC EXERCISES: CPT

## 2019-03-20 NOTE — PROGRESS NOTES
Daily Note     Today's date: 3/20/2019  Patient name: Tyrese Christian  : 1970  MRN: [id number]  Referring provider: Governor Dallas MD  Dx:   Encounter Diagnosis     ICD-10-CM    1  Chronic bilateral low back pain without sciatica M54 5     G89 29        Start Time: 1040  Stop Time: 1135  Total time in clinic (min): 55 minutes    Subjective: Pt presents to PT with continued reports of pain and symptoms  Objective: See treatment diary below    Precautions: HTN    Daily Treatment Diary     Manual  3/14 3/20           IASTM c motion in seated np missed                                                                   Exercise Diary  3/14 3/20           MHP warmup 10' NP           R  bike 5' 8'           SKC 10x10" 10x10"           LTR 10x10" 10x10"           UTR             Thoracic extension in sitting 10x10" 10x10"           Seated QL stretch 10x10" 10x10"           Pball roll outs  10x10" 10x10"           TrA nv 10x5"           SLR (flex/abd/ext)(when tolerated) nv 10x ea           Marching standing np 20x           Seated hamstring stretch 3x30" 3x30"           Cat/Cow (when tolerated) np np           Clamshells  2x10                                                                                             Modalities  3/14 3/20           MHP 10' np           TENS                                Assessment: Tolerated treatment fair  Patient demonstrated fatigue post treatment and would benefit from continued PT  Pt performed exercises as noted with minimal reports of change in symptoms  Pt needed VCing for proper form and technique with core activation and hold throughout exercises  Pt will benefit from further skilled PT to increase strength, flexibility and function  Continue to progress as able  Plan: Continue per plan of care

## 2019-03-22 ENCOUNTER — OFFICE VISIT (OUTPATIENT)
Dept: PHYSICAL THERAPY | Facility: CLINIC | Age: 49
End: 2019-03-22
Payer: COMMERCIAL

## 2019-03-22 DIAGNOSIS — G89.29 CHRONIC BILATERAL LOW BACK PAIN WITHOUT SCIATICA: Primary | ICD-10-CM

## 2019-03-22 DIAGNOSIS — M54.50 CHRONIC BILATERAL LOW BACK PAIN WITHOUT SCIATICA: Primary | ICD-10-CM

## 2019-03-22 PROCEDURE — 97140 MANUAL THERAPY 1/> REGIONS: CPT

## 2019-03-22 PROCEDURE — 97110 THERAPEUTIC EXERCISES: CPT

## 2019-03-22 NOTE — PROGRESS NOTES
Daily Note     Today's date: 3/22/2019  Patient name: Faraz Sanchez  : 1970  MRN: [id number]  Referring provider: Yvonne Smith MD  Dx:   Encounter Diagnosis     ICD-10-CM    1  Chronic bilateral low back pain without sciatica M54 5     G89 29        Start Time: 1045          Subjective: Client reports 5/10 pain Lowe back  Objective: See treatment diary below    Precautions: HTN    Daily Treatment Diary     Manual  3/14 3/20 3/22          IASTM c motion in seated np missed KS          IASTM prone LS paraspinals   KS          Quad/hip flex stretching  prone   KS                          15 min              Exercise Diary  3/14 3/20 3/22          MHP warmup 10' NP           R  bike 5' 8' 10'           SKC 10x10" 10x10" 10 x 10          LTR 10x10" 10x10" 10 x 10          UTR             Thoracic extension in sitting 10x10" 10x10" 10 x 10          Seated QL stretch 10x10" 10x10"           Pball roll outs  10x10" 10x10" 10 x 10"          TrA nv 10x5" 10 x 5"          SLR (flex/abd/ext)(when tolerated) nv 10x ea Flex/ abd 2 sets x 10  ea          Marching standing np 20x 20x          Seated hamstring stretch 3x30" 3x30" 30" x 4          Cat/Cow (when tolerated) np np           Clamshells  2x10 2 x 10                                                                                            Modalities  3/14 3/20 3/22          MHP 10' np 10'          TENS                                Assessment: Tolerated treatment fair   Pt performed exercises as noted with minimal reports of change in symptoms  Pt reports decreased patient post soft tissue mob and prone stretchesPt needed VCing for proper form and technique with core activation and hold throughout exercises  Pt will benefit from further skilled PT to increase strength, flexibility and function  Continue to progress as able  Plan: Continue per plan of care

## 2019-03-26 ENCOUNTER — APPOINTMENT (OUTPATIENT)
Dept: PHYSICAL THERAPY | Facility: CLINIC | Age: 49
End: 2019-03-26
Payer: COMMERCIAL

## 2019-03-27 ENCOUNTER — OFFICE VISIT (OUTPATIENT)
Dept: PHYSICAL THERAPY | Facility: CLINIC | Age: 49
End: 2019-03-27
Payer: COMMERCIAL

## 2019-03-27 DIAGNOSIS — M54.50 CHRONIC BILATERAL LOW BACK PAIN WITHOUT SCIATICA: Primary | ICD-10-CM

## 2019-03-27 DIAGNOSIS — G89.29 CHRONIC BILATERAL LOW BACK PAIN WITHOUT SCIATICA: Primary | ICD-10-CM

## 2019-03-27 PROCEDURE — 97140 MANUAL THERAPY 1/> REGIONS: CPT

## 2019-03-27 PROCEDURE — 97110 THERAPEUTIC EXERCISES: CPT

## 2019-03-27 NOTE — PROGRESS NOTES
Daily Note     Today's date: 3/27/2019  Patient name: Simon Arnold  : 1970  MRN: [id number]  Referring provider: Etelvina Oswald MD  Dx:   Encounter Diagnosis     ICD-10-CM    1  Chronic bilateral low back pain without sciatica M54 5     G89 29        Start Time: 1030  Stop Time: 1130  Total time in clinic (min): 60 minutes    Subjective: Pt presents to PT with no new c/o pain  Objective: See treatment diary below    Precautions: HTN    Daily Treatment Diary     Manual  3/14 3/20 3/22 3/27         IASTM c motion in seated np missed KS HY         IASTM prone LS paraspinals   KS HY         Quad/hip flex stretching  prone   KS                          15 min              Exercise Diary  3/14 3/20 3/22 3/27         MHP warmup 10' NP  --         R  bike 5' 8' 10'  8' L3         SKC 10x10" 10x10" 10 x 10 10x10"         LTR 10x10" 10x10" 10 x 10 10x10"         UTR             Thoracic extension in sitting 10x10" 10x10" 10 x 10 10x10"         Seated QL stretch 10x10" 10x10"           Pball roll outs  10x10" 10x10" 10 x 10" 10x10"         TrA nv 10x5" 10 x 5" 10x5"         SLR (flex/abd/ext)(when tolerated) nv 10x ea Flex/ abd 2 sets x 10  ea 2x10 flex/abd         Marching standing np 20x 20x 20x         Seated hamstring stretch 3x30" 3x30" 30" x 4 4x30"         Cat/Cow (when tolerated) np np           Clamshells  2x10 2 x 10 2x10                                                                                           Modalities  3/14 3/20 3/22 3/27         MHP 10' np 10' np         TENS                              Assessment: Tolerated treatment well   Pt shows moderate soft tissue restrictions in B paraspinals with IASTM  Pt will benefit from further skilled PT to increase strength, flexibility and function  Continue to progress as able  Plan: Continue per plan of care

## 2019-03-28 ENCOUNTER — APPOINTMENT (OUTPATIENT)
Dept: PHYSICAL THERAPY | Facility: CLINIC | Age: 49
End: 2019-03-28
Payer: COMMERCIAL

## 2019-04-02 ENCOUNTER — APPOINTMENT (OUTPATIENT)
Dept: PHYSICAL THERAPY | Facility: CLINIC | Age: 49
End: 2019-04-02
Payer: COMMERCIAL

## 2019-04-04 ENCOUNTER — EVALUATION (OUTPATIENT)
Dept: PHYSICAL THERAPY | Facility: CLINIC | Age: 49
End: 2019-04-04
Payer: COMMERCIAL

## 2019-04-04 DIAGNOSIS — G89.29 CHRONIC BILATERAL LOW BACK PAIN WITHOUT SCIATICA: Primary | ICD-10-CM

## 2019-04-04 DIAGNOSIS — M54.50 CHRONIC BILATERAL LOW BACK PAIN WITHOUT SCIATICA: Primary | ICD-10-CM

## 2019-04-04 PROCEDURE — 97140 MANUAL THERAPY 1/> REGIONS: CPT | Performed by: PHYSICAL MEDICINE & REHABILITATION

## 2019-04-04 PROCEDURE — 97110 THERAPEUTIC EXERCISES: CPT | Performed by: PHYSICAL MEDICINE & REHABILITATION

## 2019-05-21 ENCOUNTER — OFFICE VISIT (OUTPATIENT)
Dept: FAMILY MEDICINE CLINIC | Facility: CLINIC | Age: 49
End: 2019-05-21
Payer: COMMERCIAL

## 2019-05-21 VITALS
WEIGHT: 245 LBS | RESPIRATION RATE: 16 BRPM | TEMPERATURE: 96.2 F | HEART RATE: 64 BPM | DIASTOLIC BLOOD PRESSURE: 82 MMHG | HEIGHT: 65 IN | SYSTOLIC BLOOD PRESSURE: 134 MMHG | BODY MASS INDEX: 40.82 KG/M2 | OXYGEN SATURATION: 95 %

## 2019-05-21 DIAGNOSIS — M54.50 CHRONIC BILATERAL LOW BACK PAIN WITHOUT SCIATICA: ICD-10-CM

## 2019-05-21 DIAGNOSIS — E11.9 CONTROLLED TYPE 2 DIABETES MELLITUS WITHOUT COMPLICATION, WITHOUT LONG-TERM CURRENT USE OF INSULIN (HCC): ICD-10-CM

## 2019-05-21 DIAGNOSIS — E11.8 CONTROLLED TYPE 2 DIABETES MELLITUS WITH COMPLICATION, WITHOUT LONG-TERM CURRENT USE OF INSULIN (HCC): ICD-10-CM

## 2019-05-21 DIAGNOSIS — E78.5 DYSLIPIDEMIA, GOAL LDL BELOW 70: ICD-10-CM

## 2019-05-21 DIAGNOSIS — G43.009 MIGRAINE WITHOUT AURA AND WITHOUT STATUS MIGRAINOSUS, NOT INTRACTABLE: ICD-10-CM

## 2019-05-21 DIAGNOSIS — E55.9 VITAMIN D DEFICIENCY: Primary | ICD-10-CM

## 2019-05-21 DIAGNOSIS — G89.29 CHRONIC BILATERAL LOW BACK PAIN WITHOUT SCIATICA: ICD-10-CM

## 2019-05-21 LAB — SL AMB POCT HEMOGLOBIN AIC: 7.7 (ref ?–6.5)

## 2019-05-21 PROCEDURE — 3008F BODY MASS INDEX DOCD: CPT | Performed by: FAMILY MEDICINE

## 2019-05-21 PROCEDURE — 99214 OFFICE O/P EST MOD 30 MIN: CPT | Performed by: FAMILY MEDICINE

## 2019-05-21 PROCEDURE — 1036F TOBACCO NON-USER: CPT | Performed by: FAMILY MEDICINE

## 2019-05-21 PROCEDURE — 4010F ACE/ARB THERAPY RXD/TAKEN: CPT | Performed by: FAMILY MEDICINE

## 2019-05-21 PROCEDURE — 3045F PR MOST RECENT HEMOGLOBIN A1C LEVEL 7.0-9.0%: CPT | Performed by: FAMILY MEDICINE

## 2019-05-21 RX ORDER — GABAPENTIN 300 MG/1
300 CAPSULE ORAL 3 TIMES DAILY
Qty: 90 CAPSULE | Refills: 2 | Status: SHIPPED | OUTPATIENT
Start: 2019-05-21 | End: 2019-10-17 | Stop reason: DRUGHIGH

## 2019-05-21 RX ORDER — ATORVASTATIN CALCIUM 20 MG/1
20 TABLET, FILM COATED ORAL DAILY
Qty: 30 TABLET | Refills: 2 | Status: SHIPPED | OUTPATIENT
Start: 2019-05-21 | End: 2019-08-19 | Stop reason: SDUPTHER

## 2019-05-21 RX ORDER — TOPIRAMATE 25 MG/1
TABLET ORAL
Qty: 60 TABLET | Refills: 2 | Status: SHIPPED | OUTPATIENT
Start: 2019-05-21 | End: 2019-09-09 | Stop reason: SDUPTHER

## 2019-05-21 RX ORDER — SITAGLIPTIN 100 MG/1
100 TABLET, FILM COATED ORAL EVERY MORNING
Qty: 30 TABLET | Refills: 2 | Status: SHIPPED | OUTPATIENT
Start: 2019-05-21 | End: 2019-09-09 | Stop reason: SDUPTHER

## 2019-05-21 RX ORDER — LISINOPRIL 5 MG/1
5 TABLET ORAL DAILY
Qty: 30 TABLET | Refills: 1 | Status: SHIPPED | OUTPATIENT
Start: 2019-05-21 | End: 2019-07-18 | Stop reason: SDUPTHER

## 2019-05-28 ENCOUNTER — TELEPHONE (OUTPATIENT)
Dept: FAMILY MEDICINE CLINIC | Facility: CLINIC | Age: 49
End: 2019-05-28

## 2019-05-30 ENCOUNTER — APPOINTMENT (OUTPATIENT)
Dept: LAB | Facility: HOSPITAL | Age: 49
End: 2019-05-30
Payer: COMMERCIAL

## 2019-05-30 ENCOUNTER — TRANSCRIBE ORDERS (OUTPATIENT)
Dept: ADMINISTRATIVE | Facility: HOSPITAL | Age: 49
End: 2019-05-30

## 2019-05-30 DIAGNOSIS — E78.5 DYSLIPIDEMIA, GOAL LDL BELOW 70: ICD-10-CM

## 2019-05-30 DIAGNOSIS — E11.8 CONTROLLED TYPE 2 DIABETES MELLITUS WITH COMPLICATION, WITHOUT LONG-TERM CURRENT USE OF INSULIN (HCC): ICD-10-CM

## 2019-05-30 DIAGNOSIS — E66.01 MORBID OBESITY (HCC): ICD-10-CM

## 2019-05-30 DIAGNOSIS — E11.9 CONTROLLED TYPE 2 DIABETES MELLITUS WITHOUT COMPLICATION, WITHOUT LONG-TERM CURRENT USE OF INSULIN (HCC): ICD-10-CM

## 2019-05-30 DIAGNOSIS — G43.009 MIGRAINE WITHOUT AURA AND WITHOUT STATUS MIGRAINOSUS, NOT INTRACTABLE: ICD-10-CM

## 2019-05-30 DIAGNOSIS — E55.9 VITAMIN D DEFICIENCY: ICD-10-CM

## 2019-05-30 LAB
ALBUMIN SERPL BCP-MCNC: 4.1 G/DL (ref 3–5.2)
ALP SERPL-CCNC: 79 U/L (ref 43–122)
ALT SERPL W P-5'-P-CCNC: 43 U/L (ref 9–52)
ANION GAP SERPL CALCULATED.3IONS-SCNC: 9 MMOL/L (ref 5–14)
AST SERPL W P-5'-P-CCNC: 31 U/L (ref 17–59)
BASOPHILS # BLD AUTO: 0.1 THOUSANDS/ΜL (ref 0–0.1)
BASOPHILS NFR BLD AUTO: 1 % (ref 0–1)
BILIRUB SERPL-MCNC: 0.4 MG/DL
BUN SERPL-MCNC: 18 MG/DL (ref 5–25)
CALCIUM SERPL-MCNC: 8.9 MG/DL (ref 8.4–10.2)
CHLORIDE SERPL-SCNC: 104 MMOL/L (ref 97–108)
CHOLEST SERPL-MCNC: 180 MG/DL
CO2 SERPL-SCNC: 25 MMOL/L (ref 22–30)
CREAT SERPL-MCNC: 1.16 MG/DL (ref 0.7–1.5)
EOSINOPHIL # BLD AUTO: 0.3 THOUSAND/ΜL (ref 0–0.4)
EOSINOPHIL NFR BLD AUTO: 6 % (ref 0–6)
ERYTHROCYTE [DISTWIDTH] IN BLOOD BY AUTOMATED COUNT: 14.2 %
EST. AVERAGE GLUCOSE BLD GHB EST-MCNC: 174 MG/DL
GFR SERPL CREATININE-BSD FRML MDRD: 74 ML/MIN/1.73SQ M
GLUCOSE P FAST SERPL-MCNC: 185 MG/DL (ref 70–99)
HBA1C MFR BLD: 7.7 % (ref 4.2–6.3)
HCT VFR BLD AUTO: 43.1 % (ref 41–53)
HDLC SERPL-MCNC: 37 MG/DL (ref 40–59)
HGB BLD-MCNC: 14.2 G/DL (ref 13.5–17.5)
LDLC SERPL CALC-MCNC: 124 MG/DL
LYMPHOCYTES # BLD AUTO: 1.8 THOUSANDS/ΜL (ref 0.5–4)
LYMPHOCYTES NFR BLD AUTO: 36 % (ref 25–45)
MCH RBC QN AUTO: 26.7 PG (ref 26–34)
MCHC RBC AUTO-ENTMCNC: 32.9 G/DL (ref 31–36)
MCV RBC AUTO: 81 FL (ref 80–100)
MONOCYTES # BLD AUTO: 0.6 THOUSAND/ΜL (ref 0.2–0.9)
MONOCYTES NFR BLD AUTO: 11 % (ref 1–10)
NEUTROPHILS # BLD AUTO: 2.3 THOUSANDS/ΜL (ref 1.8–7.8)
NEUTS SEG NFR BLD AUTO: 46 % (ref 45–65)
PLATELET # BLD AUTO: 224 THOUSANDS/UL (ref 150–450)
PMV BLD AUTO: 8.6 FL (ref 8.9–12.7)
POTASSIUM SERPL-SCNC: 4 MMOL/L (ref 3.6–5)
PROT SERPL-MCNC: 7.5 G/DL (ref 5.9–8.4)
RBC # BLD AUTO: 5.31 MILLION/UL (ref 4.5–5.9)
SODIUM SERPL-SCNC: 138 MMOL/L (ref 137–147)
T4 FREE SERPL-MCNC: 1.02 NG/DL (ref 0.76–1.46)
TRIGL SERPL-MCNC: 95 MG/DL
TSH SERPL DL<=0.05 MIU/L-ACNC: 5.13 UIU/ML (ref 0.47–4.68)
WBC # BLD AUTO: 5 THOUSAND/UL (ref 4.5–11)

## 2019-05-30 PROCEDURE — 84439 ASSAY OF FREE THYROXINE: CPT

## 2019-05-30 PROCEDURE — 85025 COMPLETE CBC W/AUTO DIFF WBC: CPT

## 2019-05-30 PROCEDURE — 80053 COMPREHEN METABOLIC PANEL: CPT

## 2019-05-30 PROCEDURE — 80061 LIPID PANEL: CPT

## 2019-05-30 PROCEDURE — 36415 COLL VENOUS BLD VENIPUNCTURE: CPT

## 2019-05-30 PROCEDURE — 84443 ASSAY THYROID STIM HORMONE: CPT

## 2019-05-30 PROCEDURE — 83036 HEMOGLOBIN GLYCOSYLATED A1C: CPT

## 2019-06-26 ENCOUNTER — HOSPITAL ENCOUNTER (OUTPATIENT)
Dept: MRI IMAGING | Facility: HOSPITAL | Age: 49
Discharge: HOME | End: 2019-06-26
Payer: COMMERCIAL

## 2019-06-26 DIAGNOSIS — G89.29 CHRONIC BILATERAL LOW BACK PAIN WITHOUT SCIATICA: ICD-10-CM

## 2019-06-26 DIAGNOSIS — E11.9 CONTROLLED TYPE 2 DIABETES MELLITUS WITHOUT COMPLICATION, WITHOUT LONG-TERM CURRENT USE OF INSULIN (HCC): ICD-10-CM

## 2019-06-26 DIAGNOSIS — G43.009 MIGRAINE WITHOUT AURA AND WITHOUT STATUS MIGRAINOSUS, NOT INTRACTABLE: ICD-10-CM

## 2019-06-26 DIAGNOSIS — E11.8 CONTROLLED TYPE 2 DIABETES MELLITUS WITH COMPLICATION, WITHOUT LONG-TERM CURRENT USE OF INSULIN (HCC): ICD-10-CM

## 2019-06-26 DIAGNOSIS — E78.5 DYSLIPIDEMIA, GOAL LDL BELOW 70: ICD-10-CM

## 2019-06-26 DIAGNOSIS — M54.50 CHRONIC BILATERAL LOW BACK PAIN WITHOUT SCIATICA: ICD-10-CM

## 2019-06-26 PROCEDURE — 72148 MRI LUMBAR SPINE W/O DYE: CPT

## 2019-07-18 DIAGNOSIS — E11.9 CONTROLLED TYPE 2 DIABETES MELLITUS WITHOUT COMPLICATION, WITHOUT LONG-TERM CURRENT USE OF INSULIN (HCC): ICD-10-CM

## 2019-07-18 PROCEDURE — 4010F ACE/ARB THERAPY RXD/TAKEN: CPT | Performed by: FAMILY MEDICINE

## 2019-07-18 RX ORDER — LISINOPRIL 5 MG/1
5 TABLET ORAL DAILY
Qty: 30 TABLET | Refills: 0 | Status: SHIPPED | OUTPATIENT
Start: 2019-07-18 | End: 2019-10-17 | Stop reason: SDUPTHER

## 2019-08-19 DIAGNOSIS — E78.5 DYSLIPIDEMIA, GOAL LDL BELOW 70: ICD-10-CM

## 2019-08-19 RX ORDER — ATORVASTATIN CALCIUM 20 MG/1
20 TABLET, FILM COATED ORAL DAILY
Qty: 30 TABLET | Refills: 0 | Status: SHIPPED | OUTPATIENT
Start: 2019-08-19 | End: 2019-09-09 | Stop reason: SDUPTHER

## 2019-09-09 ENCOUNTER — OFFICE VISIT (OUTPATIENT)
Dept: FAMILY MEDICINE CLINIC | Facility: CLINIC | Age: 49
End: 2019-09-09
Payer: COMMERCIAL

## 2019-09-09 VITALS
BODY MASS INDEX: 40.32 KG/M2 | HEART RATE: 81 BPM | RESPIRATION RATE: 16 BRPM | DIASTOLIC BLOOD PRESSURE: 80 MMHG | OXYGEN SATURATION: 93 % | TEMPERATURE: 96.8 F | SYSTOLIC BLOOD PRESSURE: 130 MMHG | WEIGHT: 242 LBS | HEIGHT: 65 IN

## 2019-09-09 DIAGNOSIS — G43.009 MIGRAINE WITHOUT AURA AND WITHOUT STATUS MIGRAINOSUS, NOT INTRACTABLE: ICD-10-CM

## 2019-09-09 DIAGNOSIS — E11.9 DIABETIC EYE EXAM (HCC): ICD-10-CM

## 2019-09-09 DIAGNOSIS — Z12.5 SCREENING FOR PROSTATE CANCER: ICD-10-CM

## 2019-09-09 DIAGNOSIS — Z00.01 ENCOUNTER FOR WELL ADULT EXAM WITH ABNORMAL FINDINGS: Primary | ICD-10-CM

## 2019-09-09 DIAGNOSIS — E11.8 CONTROLLED TYPE 2 DIABETES MELLITUS WITH COMPLICATION, WITHOUT LONG-TERM CURRENT USE OF INSULIN (HCC): ICD-10-CM

## 2019-09-09 DIAGNOSIS — E55.9 VITAMIN D DEFICIENCY: ICD-10-CM

## 2019-09-09 DIAGNOSIS — Z23 NEED FOR INFLUENZA VACCINATION: ICD-10-CM

## 2019-09-09 DIAGNOSIS — E78.5 DYSLIPIDEMIA, GOAL LDL BELOW 70: ICD-10-CM

## 2019-09-09 DIAGNOSIS — E11.9 CONTROLLED TYPE 2 DIABETES MELLITUS WITHOUT COMPLICATION, WITHOUT LONG-TERM CURRENT USE OF INSULIN (HCC): ICD-10-CM

## 2019-09-09 DIAGNOSIS — Z01.00 DIABETIC EYE EXAM (HCC): ICD-10-CM

## 2019-09-09 LAB — SL AMB POCT HEMOGLOBIN AIC: 8 (ref ?–6.5)

## 2019-09-09 PROCEDURE — 99214 OFFICE O/P EST MOD 30 MIN: CPT | Performed by: FAMILY MEDICINE

## 2019-09-09 PROCEDURE — G0439 PPPS, SUBSEQ VISIT: HCPCS | Performed by: FAMILY MEDICINE

## 2019-09-09 PROCEDURE — 3045F PR MOST RECENT HEMOGLOBIN A1C LEVEL 7.0-9.0%: CPT | Performed by: FAMILY MEDICINE

## 2019-09-09 PROCEDURE — 90686 IIV4 VACC NO PRSV 0.5 ML IM: CPT

## 2019-09-09 PROCEDURE — G0008 ADMIN INFLUENZA VIRUS VAC: HCPCS

## 2019-09-09 RX ORDER — ATORVASTATIN CALCIUM 20 MG/1
20 TABLET, FILM COATED ORAL DAILY
Qty: 30 TABLET | Refills: 2 | Status: ON HOLD | OUTPATIENT
Start: 2019-09-09 | End: 2019-11-04 | Stop reason: SDUPTHER

## 2019-09-09 RX ORDER — SITAGLIPTIN 100 MG/1
100 TABLET, FILM COATED ORAL EVERY MORNING
Qty: 30 TABLET | Refills: 2 | Status: SHIPPED | OUTPATIENT
Start: 2019-09-09 | End: 2019-10-28 | Stop reason: ALTCHOICE

## 2019-09-09 RX ORDER — ERGOCALCIFEROL (VITAMIN D2) 50 MCG
1 CAPSULE ORAL DAILY
Qty: 30 CAPSULE | Refills: 1 | Status: SHIPPED | OUTPATIENT
Start: 2019-09-09 | End: 2019-09-09 | Stop reason: SDUPTHER

## 2019-09-09 RX ORDER — ERGOCALCIFEROL (VITAMIN D2) 50 MCG
1 CAPSULE ORAL DAILY
Qty: 30 CAPSULE | Refills: 1 | Status: SHIPPED | OUTPATIENT
Start: 2019-09-09 | End: 2019-09-11 | Stop reason: SDUPTHER

## 2019-09-09 RX ORDER — TOPIRAMATE 25 MG/1
TABLET ORAL
Qty: 60 TABLET | Refills: 2 | Status: SHIPPED | OUTPATIENT
Start: 2019-09-09 | End: 2019-11-04 | Stop reason: HOSPADM

## 2019-09-09 RX ORDER — LANCETS 28 GAUGE
EACH MISCELLANEOUS
Qty: 100 EACH | Refills: 1 | OUTPATIENT
Start: 2019-09-09

## 2019-09-09 NOTE — PROGRESS NOTES
Assessment and Plan:     Problem List Items Addressed This Visit        Endocrine    Diabetes mellitus type II, controlled ([facility] [address] )     Lab Results   Component Value Date    HGBA1C 8 0 (A) 09/09/2019       A chronic asymptomatic uncontrolled worsening in his hemoglobin A1c secondary patient has not been taking his medication daily discussed the patient important compliant with the medication and compliant with the low carb diet discussed important lose weight patient already on statin and he is already on Ace inhibitor           Relevant Medications    JANUVIA 100 MG tablet    metFORMIN (GLUCOPHAGE) 1000 MG tablet    Other Relevant Orders    CBC and differential    Basic metabolic panel    Lipid Panel with Direct LDL reflex    TSH, 3rd generation with Free T4 reflex    Hemoglobin A1C    Microalbumin / creatinine urine ratio    Hepatitis C antibody    POCT hemoglobin A1c (Completed)       Cardiovascular and Mediastinum    Migraine without aura and without status migrainosus, not intractable     A chronic fair control patient on topiramate patient does follow with the Neurology periodically         Relevant Medications    topiramate (TOPAMAX) 25 mg tablet       Other    Vitamin D deficiency     Chronic asymptomatic continue vitamin-D supplement vitamin-D rich diet discussed with the patient         Relevant Medications    Vitamin D, Ergocalciferol, 2000 units CAPS    Other Relevant Orders    CBC and differential    Basic metabolic panel    Lipid Panel with Direct LDL reflex    TSH, 3rd generation with Free T4 reflex    Hemoglobin A1C    Microalbumin / creatinine urine ratio    Hepatitis C antibody    Dyslipidemia, goal LDL below 70     A chronic asymptomatic patient on simvastatin 20 mg once a day patient forgot to do a blood work encouraged patient to lose weight low-fat diet and compliant with the blood work order         Relevant Medications    atorvastatin (LIPITOR) 20 mg tablet    Other Relevant Orders    CBC and differential    Basic metabolic panel    Lipid Panel with Direct LDL reflex    TSH, 3rd generation with Free T4 reflex    Hemoglobin A1C    Microalbumin / creatinine urine ratio    Encounter for well adult exam with abnormal findings - Primary     Advice and education were given regarding nutrition, aerobic exercises, weight bearing exercises, cardiovascular risk reduction, fall risk reduction, and age appropriate supplements  The patient was counseled regarding instructions for management, risk factor reductions, prognosis, risks and benefits of treatment options, patient and family education, and importance of compliance with treatment       Patient will have a flu shot today possible side effect discussed with the patient will order PSA screening for prostate cancer           Other Visit Diagnoses     Diabetic eye exam [facility])        Relevant Orders    Ambulatory Referral to Ophthalmology    Screening for prostate cancer        Relevant Orders    PSA, Total Screen    Need for influenza vaccination        Relevant Orders    SYRINGE/SINGLE-DOSE VIAL: influenza vaccine, [id number], quadrivalent, 0 5 mL, preservative-free (AFLURIA, FLUARIX, FLULAVAL, FLUZONE) (Completed)         History of Present Illness:     Patient presents for Medicare Annual Wellness visit    Patient Care Team:  Tanmay Mathews MD as PCP - General (Family Medicine)  Laura Haines PA-C     Problem List:     Patient Active Problem List   Diagnosis    Chronic back pain    Concussion with no loss of consciousness    Diabetes mellitus type II, controlled ([address] )    Insomnia    TBI (traumatic brain injury) ([address] )    Visual disturbances    Vitamin D deficiency    Weakness of left side of body    Psychosis ([address] )    Benign prostatic hyperplasia with urinary frequency    Carpal tunnel syndrome on left    Chronic post-traumatic headache, not intractable    Migraine without aura and without status migrainosus, not intractable    Dyslipidemia, goal LDL below 79    Encounter for well adult exam with abnormal findings      Past Medical and Surgical History:     Past Medical History:   Diagnosis Date    Diabetes mellitus ([facility] [address] )     Hyperlipidemia     Hypertension     Psychosis ([facility] [address] )     Visual impairment      Past Surgical History:   Procedure Laterality Date    ELEVATION OF DEPRESSED SKULL FRACTURE        Family History:     Family History   Problem Relation Age of Onset    Diabetes Father     Heart failure Mother     No Known Problems Sister     No Known Problems Brother       Social History:     Social History     Tobacco Use   Smoking Status Never Smoker   Smokeless Tobacco Never Used     Social History     Substance and Sexual Activity   Alcohol Use No    Frequency: Never    Binge frequency: Never     Social History     Substance and Sexual Activity   Drug Use No      Medications and Allergies:     Current Outpatient Medications   Medication Sig Dispense Refill    atorvastatin (LIPITOR) 20 mg tablet Take 1 tablet (20 mg total) by mouth daily 30 tablet 2    Blood Glucose Monitoring Suppl (FREESTYLE FREEDOM LITE) w/Device KIT USED DEVICE TO CHECK BLOOD SUGAR ONCE DAILY  1 each 0    Blood Glucose Monitoring Suppl (FREESTYLE LITE) CHARAN Use as directed  0    gabapentin (NEURONTIN) 300 mg capsule Take 1 capsule (300 mg total) by mouth 3 (three) times a day 90 capsule 2    glucose blood (FREESTYLE LITE) test strip Use trip to test blood sugar daily   100 each 1    glucose monitoring kit (FREESTYLE) monitoring kit 1 each by Does not apply route 3 (three) times a day 1 each 0    JANUVIA 100 MG tablet Take 1 tablet (100 mg total) by mouth every morning 30 tablet 2    Lancets (FREESTYLE) lancets by Other route 3 (three) times a day Use as instructed 100 each 1    lisinopril (ZESTRIL) 5 mg tablet Take 1 tablet (5 mg total) by mouth daily 30 tablet 0    magnesium oxide (MAG-OX) 400 mg tablet Take 1 tablet by mouth daily      metFORMIN (GLUCOPHAGE) 1000 MG tablet Take 1 tablet (1,000 mg total) by mouth daily with breakfast 90 tablet 0    PHARMACIST CHOICE ALCOHOL 70 % PADS USE 3 TIMES A DAY TO 4 TIMES A DAY  11    topiramate (TOPAMAX) 25 mg tablet By oral route take 1 tablet twice daily or as directed 60 tablet 2    Vitamin D, Ergocalciferol, 2000 units CAPS Take 1 tablet by mouth daily 30 capsule 1     No current facility-administered medications for this visit  No Known Allergies   Immunizations:     Immunization History   Administered Date(s) Administered    Influenza, injectable, quadrivalent, preservative free 0 5 mL 12/10/2018, 09/09/2019    Pneumococcal Polysaccharide PPV23 02/13/2019    Tdap 11/18/2016      Medicare Screening Tests and Risk Assessments:     Guicho Worley is here for his Subsequent Wellness visit  Last Medicare Wellness visit information reviewed, patient interviewed and updates made to the record today  Health Risk Assessment:  Patient rates overall health as good  Patient feels that their physical health rating is Same  Eyesight was rated as Slightly worse  Hearing was rated as Same  Patient feels that their emotional and mental health rating is Slightly worse  Pain experienced by patient in the last 7 days has been A lot  Patient's pain rating has been 8/10  Patient states that he has experienced no weight loss or gain in last 6 months  (Additional comments: Back pain )    Emotional/Mental Health:  Patient has been feeling nervous/anxious  PHQ-9 Depression Screening:    Frequency of the following problems over the past two weeks:      1  Little interest or pleasure in doing things: 1 - several days      2  Feeling down, depressed, or hopeless: 1 - several days  PHQ-2 Score: 2          Broken Bones/Falls: Fall Risk Assessment:    In the past year, patient has experienced: No history of falling in past year          Bladder/Bowel:  Patient has not leaked urine accidently in the last six months    Patient reports no loss of bowel control  Immunizations:  Patient has had a flu vaccination within the last year  Patient has received a pneumonia shot  Patient has not received a shingles shot  Patient has received tetanus/diphtheria shot  Date of tetanus/diphtheria shot: 11/18/2016    Home Safety:  Patient has trouble with stairs inside or outside of their home  Patient currently reports that there are no safety hazards present in home, working smoke alarms, working carbon monoxide detectors  Preventative Screenings:   no prostate cancer screen performed, no colon cancer screen completed, cholesterol screen completed, glaucoma eye exam completed,     Nutrition:  Current diet: Regular with servings of the following:    Medications:  Patient is currently taking over-the-counter supplements  List of OTC medications includes: vitamins  Patient is able to manage medications  Lifestyle Choices:  Patient reports no tobacco use  Patient has not smoked or used tobacco in the past   Patient reports no alcohol use  Patient does not drive a vehicle  Patient wears seat belt  Current level of exercise of physical activity described by patient as: limited  Activities of Daily Living:  Can get out of bed by his or her self, able to dress self, able to make own meals, able to do own shopping, able to bathe self, can do own laundry/housekeeping, can manage own money, pay bills and track expenses    Previous Hospitalizations:  No hospitalization or ED visit in past 12 months        Advanced Directives:  Patient has not decided on power of   Patient has not completed advanced directive          Preventative Screening/Counseling:      Cardiovascular:      General: Risks and Benefits Discussed and Screening Current      Counseling: Healthy Diet and Healthy Weight          Diabetes:      General: Risks and Benefits Discussed and Screening Current      Counseling: Healthy Diet and Healthy Weight Colorectal Cancer:      General: Risks and Benefits Discussed and Screening Not Indicated          Prostate Cancer:      General: Risks and Benefits Discussed      Due for labs: PSA          Osteoporosis:      General: Risks and Benefits Discussed and Screening Not Indicated          AAA:      General: Risks and Benefits Discussed and Screening Not Indicated          Glaucoma:      General: Risks and Benefits Discussed and Screening Not Indicated          HIV:      General: Risks and Benefits Discussed and Patient Declines          Hepatitis C:      General: Risks and Benefits Discussed      Counseling: has received general HCV counseling        Advanced Directives:   5 wishes given

## 2019-09-09 NOTE — PROGRESS NOTES
Subjective:   Chief Complaint   Patient presents with    Medicare Wellness Visit     Yearly Medicare Wellness Exam         Patient ID: Merline Lin is a 50 y o  male  The patient here for a Medicare physical exam follow-up with a chronic condition patient's history of diabetic on metformin and Januvia per wife he has not been taking his metformin daily basis his hemoglobin A1c worse compared with before and he is not compliant with his the low carb diet and he is asymptomatic deny increased thirsty increased frequency urination no dizziness and no headache and no abdomen pain patient's history of hyperlipidemia on simvastatin 20 mg tolerated well without any chest pain short of breath no palpitation no headache no blurred vision no weakness or lateralized of the symptom patient history of vitamin-D deficiency on vitamin-D supplement 2000 International Units once a day deny any bone pain no joint pain and no muscle pain no fatigue patient history of migraine headache she he been on Topamax and he did not have headache for the lost a months a he had less than once a months exacerbation and patient deny any blurred vision no weakness no numbness no lateralized of the symptom no fever no weight change patient does follow up with the Neurology  Patient forgot to do his blood work      The following portions of the patient's history were reviewed and updated as appropriate: allergies, current medications, past family history, past medical history, past social history, past surgical history and problem list     Review of Systems   Constitutional: Negative for fatigue and fever  HENT: Negative for ear pain, sinus pressure, sinus pain and sore throat  Eyes: Negative for pain and redness  Respiratory: Negative for cough, chest tightness and shortness of breath  Cardiovascular: Negative for chest pain, palpitations and leg swelling     Gastrointestinal: Negative for abdominal pain, blood in stool, constipation, diarrhea and nausea  Genitourinary: Negative for flank pain, frequency and hematuria  Musculoskeletal: Negative for back pain and joint swelling  Skin: Negative for rash  Neurological: Negative for dizziness, numbness and headaches  Hematological: Does not bruise/bleed easily  Objective:  Vitals:    09/09/19 1559 09/09/19 1628   BP: 152/98 130/80   BP Location: Left arm    Patient Position: Sitting    Cuff Size: Large    Pulse: 81    Resp: 16    Temp: (!) 96 8 °F (36 °C)    TempSrc: Tympanic    SpO2: 93%    Weight: 110 kg (242 lb)    Height: 5' 5" (1 651 m)       Physical Exam   Constitutional: He is oriented to person, place, and time  He appears well-developed and well-nourished  HENT:   Head: Normocephalic  Right Ear: External ear normal    Left Ear: External ear normal    Eyes: Conjunctivae and EOM are normal  Right eye exhibits no discharge  Left eye exhibits no discharge  Neck: No JVD present  Cardiovascular: Normal rate, regular rhythm and normal heart sounds  Exam reveals no gallop  No murmur heard  Pulmonary/Chest: Effort normal  No respiratory distress  He has no wheezes  He has no rales  He exhibits no tenderness  Abdominal: He exhibits no mass  There is no tenderness  There is no rebound  Musculoskeletal: He exhibits no edema or tenderness  Neurological: He is alert and oriented to person, place, and time  Skin: No rash noted  No erythema           Assessment/Plan:    Diabetes mellitus type II, controlled ([address] )  Lab Results   Component Value Date    HGBA1C 8 0 (A) 09/09/2019       A chronic asymptomatic uncontrolled worsening in his hemoglobin A1c secondary patient has not been taking his medication daily discussed the patient important compliant with the medication and compliant with the low carb diet discussed important lose weight patient already on statin and he is already on Ace inhibitor      Dyslipidemia, goal LDL below 70  A chronic asymptomatic patient on simvastatin 20 mg once a day patient forgot to do a blood work encouraged patient to lose weight low-fat diet and compliant with the blood work order    Vitamin D deficiency  Chronic asymptomatic continue vitamin-D supplement vitamin-D rich diet discussed with the patient    Migraine without aura and without status migrainosus, not intractable  A chronic fair control patient on topiramate patient does follow with the Neurology periodically    Encounter for well adult exam with abnormal findings  Advice and education were given regarding nutrition, aerobic exercises, weight bearing exercises, cardiovascular risk reduction, fall risk reduction, and age appropriate supplements  The patient was counseled regarding instructions for management, risk factor reductions, prognosis, risks and benefits of treatment options, patient and family education, and importance of compliance with treatment  Patient will have a flu shot today possible side effect discussed with the patient will order PSA screening for prostate cancer       Diagnoses and all orders for this visit:    Encounter for well adult exam with abnormal findings    Migraine without aura and without status migrainosus, not intractable  -     topiramate (TOPAMAX) 25 mg tablet; By oral route take 1 tablet twice daily or as directed    Vitamin D deficiency  -     Discontinue: Vitamin D, Ergocalciferol, 2000 units CAPS; Take 1 tablet by mouth daily  -     Vitamin D, Ergocalciferol, 2000 units CAPS; Take 1 tablet by mouth daily  -     CBC and differential; Future  -     Basic metabolic panel; Future  -     Lipid Panel with Direct LDL reflex; Future  -     TSH, 3rd generation with Free T4 reflex; Future  -     Hemoglobin A1C; Future  -     Microalbumin / creatinine urine ratio  -     Hepatitis C antibody; Future    Dyslipidemia, goal LDL below 70  -     atorvastatin (LIPITOR) 20 mg tablet;  Take 1 tablet (20 mg total) by mouth daily  -     CBC and differential; Future  -     Basic metabolic panel; Future  -     Lipid Panel with Direct LDL reflex; Future  -     TSH, 3rd generation with Free T4 reflex; Future  -     Hemoglobin A1C; Future  -     Microalbumin / creatinine urine ratio    Controlled type 2 diabetes mellitus with complication, without long-term current use of insulin (HCC)  -     JANUVIA 100 MG tablet; Take 1 tablet (100 mg total) by mouth every morning  -     CBC and differential; Future  -     Basic metabolic panel; Future  -     Lipid Panel with Direct LDL reflex; Future  -     TSH, 3rd generation with Free T4 reflex; Future  -     Hemoglobin A1C; Future  -     Microalbumin / creatinine urine ratio  -     Hepatitis C antibody; Future  -     POCT hemoglobin A1c  -     metFORMIN (GLUCOPHAGE) 1000 MG tablet; Take 1 tablet (1,000 mg total) by mouth daily with breakfast    Diabetic eye exam [facility])  -     Ambulatory Referral to Ophthalmology; Future    Screening for prostate cancer  -     PSA, Total Screen; Future    Need for influenza vaccination  -     SYRINGE/SINGLE-DOSE VIAL: influenza vaccine, [id number], quadrivalent, 0 5 mL, preservative-free (AFLURIA, FLUARIX, FLULAVAL, FLUZONE)    Other orders  -     Discontinue: metFORMIN (GLUCOPHAGE) 1000 MG tablet;  Take 1 tablet (1,000 mg total) by mouth daily with breakfast

## 2019-09-09 NOTE — PATIENT INSTRUCTIONS
Obesity   AMBULATORY CARE:   Obesity  is when your body mass index (BMI) is greater than 30  Your healthcare provider will use your height and weight to measure your BMI  The risks of obesity include  many health problems, such as injuries or physical disability  You may need tests to check for the following:  · Diabetes     · High blood pressure or high cholesterol     · Heart disease     · Gallbladder or liver disease     · Cancer of the colon, breast, prostate, liver, or kidney     · Sleep apnea     · Arthritis or gout  Seek care immediately if:   · You have a severe headache, confusion, or difficulty speaking  · You have weakness on one side of your body  · You have chest pain, sweating, or shortness of breath  Contact your healthcare provider if:   · You have symptoms of gallbladder or liver disease, such as pain in your upper abdomen  · You have knee or hip pain and discomfort while walking  · You have symptoms of diabetes, such as intense hunger and thirst, and frequent urination  · You have symptoms of sleep apnea, such as snoring or daytime sleepiness  · You have questions or concerns about your condition or care  Treatment for obesity  focuses on helping you lose weight to improve your health  Even a small decrease in BMI can reduce the risk for many health problems  Your healthcare provider will help you set a weight-loss goal   · Lifestyle changes  are the first step in treating obesity  These include making healthy food choices and getting regular physical activity  Your healthcare provider may suggest a weight-loss program that involves coaching, education, and therapy  · Medicine  may help you lose weight when it is used with a healthy diet and physical activity  · Surgery  can help you lose weight if you are very obese and have other health problems  There are several types of weight-loss surgery  Ask your healthcare provider for more information    Be successful losing weight:   · Set small, realistic goals  An example of a small goal is to walk for 20 minutes 5 days a week  Anther goal is to lose 5% of your body weight  · Tell friends, family members, and coworkers about your goals  and ask for their support  Ask a friend to lose weight with you, or join a weight-loss support group  · Identify foods or triggers that may cause you to overeat , and find ways to avoid them  Remove tempting high-calorie foods from your home and workplace  Place a bowl of fresh fruit on your kitchen counter  If stress causes you to eat, then find other ways to cope with stress  · Keep a diary to track what you eat and drink  Also write down how many minutes of physical activity you do each day  Weigh yourself once a week and record it in your diary  Eating changes: You will need to eat 500 to 1,000 fewer calories each day than you currently eat to lose 1 to 2 pounds a week  The following changes will help you cut calories:  · Eat smaller portions  Use small plates, no larger than 9 inches in diameter  Fill your plate half full of fruits and vegetables  Measure your food using measuring cups until you know what a serving size looks like  · Eat 3 meals and 1 or 2 snacks each day  Plan your meals in advance  Reubin Daisha and eat at home most of the time  Eat slowly  · Eat fruits and vegetables at every meal   They are low in calories and high in fiber, which makes you feel full  Do not add butter, margarine, or cream sauce to vegetables  Use herbs to season steamed vegetables  · Eat less fat and fewer fried foods  Eat more baked or grilled chicken and fish  These protein sources are lower in calories and fat than red meat  Limit fast food  Dress your salads with olive oil and vinegar instead of bottled dressing  · Limit the amount of sugar you eat  Do not drink sugary beverages  Limit alcohol  Activity changes:  Physical activity is good for your body in many ways   It helps you burn calories and build strong muscles  It decreases stress and depression, and improves your mood  It can also help you sleep better  Talk to your healthcare provider before you begin an exercise program   · Exercise for at least 30 minutes 5 days a week  Start slowly  Set aside time each day for physical activity that you enjoy and that is convenient for you  It is best to do both weight training and an activity that increases your heart rate, such as walking, bicycling, or swimming  · Find ways to be more active  Do yard work and housecleaning  Walk up the stairs instead of using elevators  Spend your leisure time going to events that require walking, such as outdoor festivals or fairs  This extra physical activity can help you lose weight and keep it off  Follow up with your healthcare provider as directed: You may need to meet with a dietitian  Write down your questions so you remember to ask them during your visits  © 2017 [address] [employer] Information is for End User's use only and may not be sold, redistributed or otherwise used for commercial purposes  All illustrations and images included in CareNotes® are the copyrighted property of [employer]  or Adarsh Manzo  The above information is an  only  It is not intended as medical advice for individual conditions or treatments  Talk to your doctor, nurse or pharmacist before following any medical regimen to see if it is safe and effective for you  Urinary Incontinence   WHAT YOU NEED TO KNOW:   What is urinary incontinence? Urinary incontinence (UI) is when you lose control of your bladder  What causes UI? UI occurs because your bladder cannot store or empty urine properly  The following are the most common types of UI:  · Stress incontinence  is when you leak urine due to increased bladder pressure  This may happen when you cough, sneeze, or exercise       · Urge incontinence  is when you feel the need to urinate right away and leak urine accidentally  · Mixed incontinence  is when you have both stress and urge UI  What are the signs and symptoms of UI?   · You feel like your bladder does not empty completely when you urinate  · You urinate often and need to urinate immediately  · You leak urine when you sleep, or you wake up with the urge to urinate  · You leak urine when you cough, sneeze, exercise, or laugh  How is UI diagnosed? Your healthcare provider will ask how often you leak urine and whether you have stress or urge symptoms  Tell him which medicines you take, how often you urinate, and how much liquid you drink each day  You may need any of the following tests:  · Urine tests  may show infection or kidney function  · A pelvic exam  may be done to check for blockages  A pelvic exam will also show if your bladder, uterus, or other organs have moved out of place  · An x-ray, ultrasound, or CT  may show problems with parts of your urinary system  You may be given contrast liquid to help your organs show up better in the pictures  Tell the healthcare provider if you have ever had an allergic reaction to contrast liquid  Do not enter the MRI room with anything metal  Metal can cause serious injury  Tell the healthcare provider if you have any metal in or on your body  · A bladder scan  will show how much urine is left in your bladder after you urinate  You will be asked to urinate and then healthcare providers will use a small ultrasound machine to check the urine left in your bladder  · Cystometry  is used to check the function of your urinary system  Your healthcare provider checks the pressure in your bladder while filling it with fluid  Your bladder pressure may also be tested when your bladder is full and while you urinate  How is UI treated? · Medicines  can help strengthen your bladder control      · Electrical stimulation  is used to send a small amount of electrical energy to your pelvic floor muscles  This helps control your bladder function  Electrodes may be placed outside your body or in your rectum  For women, the electrodes may be placed in the vagina  · A bulking agent  may be injected into the wall of your urethra to make it thicker  This helps keep your urethra closed and decreases urine leakage  · Devices  such as a clamp, pessary, or tampon may help stop urine leaks  Ask your healthcare provider for more information about these and other devices  · Surgery  may be needed if other treatments do not work  Several types of surgery can help improve your bladder control  Ask your healthcare provider for more information about the surgery you may need  How can I manage my symptoms? · Do pelvic muscle exercises often  Your pelvic muscles help you stop urinating  Squeeze these muscles tight for 5 seconds, then relax for 5 seconds  Gradually work up to squeezing for 10 seconds  Do 3 sets of 15 repetitions a day, or as directed  This will help strengthen your pelvic muscles and improve bladder control  · A catheter  may be used to help empty your bladder  A catheter is a tiny, plastic tube that is put into your bladder to drain your urine  Your healthcare provider may tell you to use a catheter to prevent your bladder from getting too full and leaking urine  · Keep a UI record  Write down how often you leak urine and how much you leak  Make a note of what you were doing when you leaked urine  · Train your bladder  Go to the bathroom at set times, such as every 2 hours, even if you do not feel the urge to go  You can also try to hold your urine when you feel the urge to go  For example, hold your urine for 5 minutes when you feel the urge to go  As that becomes easier, hold your urine for 10 minutes  · Drink liquids as directed  Ask your healthcare provider how much liquid to drink each day and which liquids are best for you   You may need to limit the amount of liquid you drink to help control your urine leakage  Limit or do not have drinks that contain caffeine or alcohol  Do not drink any liquid right before you go to bed  · Prevent constipation  Eat a variety of high-fiber foods  Good examples are high-fiber cereals, beans, vegetables, and whole-grain breads  Prune juice may help make your bowel movement softer  Walking is the best way to trigger your intestines to have a bowel movement  · Exercise regularly and maintain a healthy weight  Ask your healthcare provider how much you should weigh and about the best exercise plan for you  Weight loss and exercise will decrease pressure on your bladder and help you control your leakage  Ask him to help you create a weight loss plan if you are overweight  When should I seek immediate care? · You have severe pain  · You are confused or cannot think clearly  When should I contact my healthcare provider? · You have a fever  · You see blood in your urine  · You have pain when you urinate  · You have new or worse pain, even after treatment  · Your mouth feels dry or you have vision changes  · Your urine is cloudy or smells bad  · You have questions or concerns about your condition or care  CARE AGREEMENT:   You have the right to help plan your care  Learn about your health condition and how it may be treated  Discuss treatment options with your caregivers to decide what care you want to receive  You always have the right to refuse treatment  The above information is an  only  It is not intended as medical advice for individual conditions or treatments  Talk to your doctor, nurse or pharmacist before following any medical regimen to see if it is safe and effective for you  © 2017 [address] [employer] Information is for End User's use only and may not be sold, redistributed or otherwise used for commercial purposes   All illustrations and images included in CareNotes® are the copyrighted property of [employer]  or Adarsh Manzo  Cigarette Smoking and Your Health   AMBULATORY CARE:   Risks to your health if you smoke:  Nicotine and other chemicals found in tobacco damage every cell in your body  Even if you are a light smoker, you have an increased risk for cancer, heart disease, and lung disease  If you are pregnant or have diabetes, smoking increases your risk for complications  Benefits to your health if you stop smoking:   · You decrease respiratory symptoms such as coughing, wheezing, and shortness of breath  · You reduce your risk for cancers of the lung, mouth, throat, kidney, bladder, pancreas, stomach, and cervix  If you already have cancer, you increase the benefits of chemotherapy  You also reduce your risk for cancer returning or a second cancer from developing  · You reduce your risk for heart disease, blood clots, heart attack, and stroke  · You reduce your risk for lung infections, and diseases such as pneumonia, asthma, chronic bronchitis, and emphysema  · Your circulation improves  More oxygen can be delivered to your body  If you have diabetes, you lower your risk for complications, such as kidney, artery, and eye diseases  You also lower your risk for nerve damage  Nerve damage can lead to amputations, poor vision, and blindness  · You improve your body's ability to heal and to fight infections  Benefits to the health of others if you stop smoking:  Tobacco is harmful to nonsmokers who breathe in your secondhand smoke  The following are ways the health of others around you may improve when you stop smoking:  · You lower the risks for lung cancer and heart disease in nonsmoking adults  · If you are pregnant, you lower the risk for miscarriage, early delivery, low birth weight, and stillbirth  You also lower your baby's risk for SIDS, obesity, developmental delay, and neurobehavioral problems, such as ADHD  · If you have children, you lower their risk for ear infections, colds, pneumonia, bronchitis, and asthma  For more information and support to stop smoking:   · Smokefree  gov  Phone: 8- 503 - 960-6197  Web Address: www smokefrCole Martin  Follow up with your healthcare provider as directed:  Write down your questions so you remember to ask them during your visits  © 2017 [address] [employer] Information is for End User's use only and may not be sold, redistributed or otherwise used for commercial purposes  All illustrations and images included in CareNotes® are the copyrighted property of [employer]  or Adarsh Manzo  The above information is an  only  It is not intended as medical advice for individual conditions or treatments  Talk to your doctor, nurse or pharmacist before following any medical regimen to see if it is safe and effective for you  Fall Prevention   WHAT YOU NEED TO KNOW:   What is fall prevention? Fall prevention includes ways to make your home and other areas safer  It also includes ways you can move more carefully to prevent a fall  What increases my risk for falls? · Lack of vitamin D    · Not getting enough sleep each night    · Trouble walking or keeping your balance, or foot problems    · Health conditions that cause changes in your blood pressure, vision, or muscle strength and coordination    · Medicines that make you dizzy, weak, or sleepy    · Problems seeing clearly    · Shoes that have high heels or are not supportive    · Tripping hazards, such as items left on the floor, no handrails on the stairs, or broken steps  How can I help protect myself from falls? · Stand or sit up slowly  This may help you keep your balance and prevent falls  If you need to get up during the night, sit up first  Be sure you are fully awake before you stand  Turn on the light before you start walking  Go slowly in case you are still sleepy   Make sure you will not trip over any pets sleeping in the bedroom  · Use assistive devices as directed  Your healthcare provider may suggest that you use a cane or walker to help you keep your balance  You may need to have grab bars put in your bathroom near the toilet or in the shower  · Wear shoes that fit well and have soles that   Wear shoes both inside and outside  Use slippers with good   Do not wear shoes with high heels  · Wear a personal alarm  This is a device that allows you to call 911 if you fall and need help  Ask your healthcare provider for more information  · Stay active  Exercise can help strengthen your muscles and improve your balance  Your healthcare provider may recommend water aerobics or walking  He or she may also recommend physical therapy to improve your coordination  Never start an exercise program without talking to your healthcare provider first      · Manage medical conditions  Keep all appointments with your healthcare providers  Visit your eye doctor as directed  How can I make my home safer? · Add items to prevent falls in the bathroom  Put nonslip strips on your bath or shower floor to prevent you from slipping  Use a bath mat if you do not have carpet in the bathroom  This will prevent you from falling when you step out of the bath or shower  Use a shower seat so you do not need to stand while you shower  Sit on the toilet or a chair in your bathroom to dry yourself and put on clothing  This will prevent you from losing your balance from drying or dressing yourself while you are standing  · Keep paths clear  Remove books, shoes, and other objects from walkways and stairs  Place cords for telephones and lamps out of the way so that you do not need to walk over them  Tape them down if you cannot move them  Remove small rugs  If you cannot remove a rug, secure it with double-sided tape  This will prevent you from tripping  · Install bright lights in your home  Use night lights to help light paths to the bathroom or kitchen  Always turn on the light before you start walking  · Keep items you use often on shelves within reach  Do not use a step stool to help you reach an item  · Paint or place reflective tape on the edges of your stairs  This will help you see the stairs better  Call 911 or have someone else call if:   · You have fallen and are unconscious  · You have fallen and cannot move part of your body  Contact your healthcare provider if:   · You have fallen and have pain or a headache  · You have questions or concerns about your condition or care  CARE AGREEMENT:   You have the right to help plan your care  Learn about your health condition and how it may be treated  Discuss treatment options with your caregivers to decide what care you want to receive  You always have the right to refuse treatment  The above information is an  only  It is not intended as medical advice for individual conditions or treatments  Talk to your doctor, nurse or pharmacist before following any medical regimen to see if it is safe and effective for you  © 2017 [address] [facility] Information is for End User's use only and may not be sold, redistributed or otherwise used for commercial purposes  All illustrations and images included in CareNotes® are the copyrighted property of [employer]  or Adarsh Manzo  Advance Directives   WHAT YOU NEED TO KNOW:   What are advance directives? Advance directives are legal documents that state your wishes and plans for medical care  These plans are made ahead of time in case you lose your ability to make decisions for yourself  Advance directives can apply to any medical decision, such as the treatments you want, and if you want to donate organs  What are the types of advance directives? There are many types of advance directives, and each state has rules about how to use them   You may choose a combination of any of the following:  · Living will: This is a written record of the treatment you want  You can also choose which treatments you do not want, which to limit, and which to stop at a certain time  This includes surgery, medicine, IV fluid, and tube feedings  · Durable power of  for healthcare [facility]): This is a written record that states who you want to make healthcare choices for you when you are unable to make them for yourself  This person, called a proxy, is usually a family member or a friend  You may choose more than 1 proxy  · Do not resuscitate (DNR) order:  A DNR order is used in case your heart stops beating or you stop breathing  It is a request not to have certain forms of treatment, such as CPR  A DNR order may be included in other types of advance directives  · Medical directive: This covers the care that you want if you are in a coma, near death, or unable to make decisions for yourself  You can list the treatments you want for each condition  Treatment may include pain medicine, surgery, blood transfusions, dialysis, IV or tube feedings, and a ventilator (breathing machine)  · Values history: This document has questions about your views, beliefs, and how you feel and think about life  This information can help others choose the care that you would choose  Why are advance directives important? An advance directive helps you control your care  Although spoken wishes may be used, it is better to have your wishes written down  Spoken wishes can be misunderstood, or not followed  Treatments may be given even if you do not want them  An advance directive may make it easier for your family to make difficult choices about your care  How do I decide what to put in my advance directives? · Make informed decisions:  Make sure you fully understand treatments or care you may receive   Think about the benefits and problems your decisions could cause for you or your family  Talk to healthcare providers if you have concerns or questions before you write down your wishes  You may also want to talk with your Taoist or , or a   Check your state laws to make sure that what you put in your advance directive is legal      · Sign all forms:  Sign and date your advance directive when you have finished  You may also need 2 witnesses to sign the forms  Witnesses cannot be your doctor or his staff, your spouse, heirs or beneficiaries, people you owe money to, or your chosen proxy  Talk to your family, proxy, and healthcare providers about your advance directive  Give each person a copy, and keep one for yourself in a place you can get to easily  Do not keep it hidden or locked away  · Review and revise your plans: You can revise your advance directive at any time, as long as you are able to make decisions  Review your plan every year, and when there are changes in your life, or your health  When you make changes, let your family, proxy, and healthcare providers know  Give each a new copy  Where can I find more information? · [employer]  [address] , [address]   Phone: 0- 404 - [phone]  Phone: 9- [phone] - [phone]  Web Address: http://www  aafp org  · [address] [facility])  [address] [facility] [place], [address]  Phone: [phone]  Phone: [phone]  Web Address: Ces[facility] dent  CARE AGREEMENT:   You have the right to help plan your care  To help with this plan, you must learn about your health condition and treatment options  You must also learn about advance directives and how they are used  Work with your healthcare providers to decide what care will be used to treat you  You always have the right to refuse treatment  The above information is an  only   It is not intended as medical advice for individual conditions or treatments  Talk to your doctor, nurse or pharmacist before following any medical regimen to see if it is safe and effective for you  © 2017 [address] [employer] Information is for End User's use only and may not be sold, redistributed or otherwise used for commercial purposes  All illustrations and images included in CareNotes® are the copyrighted property of [employer] M [employer]  or Adarsh Manzo

## 2019-09-10 PROBLEM — Z00.01 ENCOUNTER FOR WELL ADULT EXAM WITH ABNORMAL FINDINGS: Status: ACTIVE | Noted: 2019-09-09

## 2019-09-10 PROBLEM — Z00.01 ENCOUNTER FOR WELL ADULT EXAM WITH ABNORMAL FINDINGS: Status: ACTIVE | Noted: 2019-09-10

## 2019-09-10 NOTE — ASSESSMENT & PLAN NOTE
A chronic asymptomatic patient on simvastatin 20 mg once a day patient forgot to do a blood work encouraged patient to lose weight low-fat diet and compliant with the blood work order

## 2019-09-10 NOTE — ASSESSMENT & PLAN NOTE
Lab Results   Component Value Date    HGBA1C 8 0 (A) 09/09/2019       A chronic asymptomatic uncontrolled worsening in his hemoglobin A1c secondary patient has not been taking his medication daily discussed the patient important compliant with the medication and compliant with the low carb diet discussed important lose weight patient already on statin and he is already on Ace inhibitor

## 2019-09-10 NOTE — ASSESSMENT & PLAN NOTE
Advice and education were given regarding nutrition, aerobic exercises, weight bearing exercises, cardiovascular risk reduction, fall risk reduction, and age appropriate supplements  The patient was counseled regarding instructions for management, risk factor reductions, prognosis, risks and benefits of treatment options, patient and family education, and importance of compliance with treatment       Patient will have a flu shot today possible side effect discussed with the patient will order PSA screening for prostate cancer

## 2019-09-11 DIAGNOSIS — E55.9 VITAMIN D DEFICIENCY: ICD-10-CM

## 2019-09-11 RX ORDER — ERGOCALCIFEROL (VITAMIN D2) 50 MCG
1 CAPSULE ORAL DAILY
Qty: 30 CAPSULE | Refills: 2 | Status: SHIPPED | OUTPATIENT
Start: 2019-09-11

## 2019-10-17 ENCOUNTER — OFFICE VISIT (OUTPATIENT)
Dept: FAMILY MEDICINE CLINIC | Facility: CLINIC | Age: 49
End: 2019-10-17
Payer: COMMERCIAL

## 2019-10-17 VITALS
TEMPERATURE: 97.2 F | HEIGHT: 65 IN | HEART RATE: 64 BPM | RESPIRATION RATE: 16 BRPM | DIASTOLIC BLOOD PRESSURE: 98 MMHG | WEIGHT: 242 LBS | SYSTOLIC BLOOD PRESSURE: 142 MMHG | OXYGEN SATURATION: 95 % | BODY MASS INDEX: 40.32 KG/M2

## 2019-10-17 DIAGNOSIS — E11.9 CONTROLLED TYPE 2 DIABETES MELLITUS WITHOUT COMPLICATION, WITHOUT LONG-TERM CURRENT USE OF INSULIN (HCC): ICD-10-CM

## 2019-10-17 DIAGNOSIS — M51.36 DEGENERATIVE DISC DISEASE, LUMBAR: Primary | ICD-10-CM

## 2019-10-17 PROCEDURE — 3008F BODY MASS INDEX DOCD: CPT | Performed by: FAMILY MEDICINE

## 2019-10-17 PROCEDURE — 99214 OFFICE O/P EST MOD 30 MIN: CPT | Performed by: FAMILY MEDICINE

## 2019-10-17 RX ORDER — LISINOPRIL 5 MG/1
5 TABLET ORAL DAILY
Qty: 30 TABLET | Refills: 3 | Status: ON HOLD | OUTPATIENT
Start: 2019-10-17 | End: 2019-11-04 | Stop reason: SDUPTHER

## 2019-10-17 RX ORDER — GABAPENTIN 300 MG/1
300 CAPSULE ORAL 4 TIMES DAILY
Qty: 120 CAPSULE | Refills: 1 | Status: SHIPPED | OUTPATIENT
Start: 2019-10-17 | End: 2019-11-04 | Stop reason: HOSPADM

## 2019-10-17 RX ORDER — KETOROLAC TROMETHAMINE 30 MG/ML
30 INJECTION, SOLUTION INTRAMUSCULAR; INTRAVENOUS ONCE
Status: COMPLETED | OUTPATIENT
Start: 2019-10-17 | End: 2019-10-17

## 2019-10-17 RX ORDER — METHYLPREDNISOLONE 4 MG/1
TABLET ORAL
Qty: 21 EACH | Refills: 0 | Status: SHIPPED | OUTPATIENT
Start: 2019-10-17 | End: 2019-11-04 | Stop reason: HOSPADM

## 2019-10-17 RX ADMIN — KETOROLAC TROMETHAMINE 30 MG: 30 INJECTION, SOLUTION INTRAMUSCULAR; INTRAVENOUS at 14:02

## 2019-10-17 NOTE — ASSESSMENT & PLAN NOTE
Acute on chronic back pain will increase the gabapentin to 300 mg 4 times a day will give him Medrol pack to take it as directed on the pack we discussed the patient important lose weight proper postural will refer the patient to physical therapy and will refer the patient to Pain Management patient will have Toradol shot for the pain today patient tolerated well without side effect

## 2019-10-17 NOTE — PROGRESS NOTES
Subjective:   Chief Complaint   Patient presents with    Back Pain     low back pain states he fell in a hole while walking on [facility] in 2016 and he has been suffering from this pain         Patient ID: Loli Emerson is a 52 y o  male  Patient who known to have history of chronic back pain secondary discogenic disease in the lumbar spine he came with the exacerbation his back and is radiating to his right lower extremity and go below-the-knee and he describes his pain as dull achy an 8/10 and and not associated with the muscle weakness no drop on the foot no numbness the the pain is worse when he sit when he lied down and sometimes awaken from sleep deny any recent fall recent trauma no lose control of the urine or stool no fever and no weight change patient is on gabapentin 300 mg 3 times a day      The following portions of the patient's history were reviewed and updated as appropriate: allergies, current medications, past family history, past medical history, past social history, past surgical history and problem list     Review of Systems   Constitutional: Negative for fatigue and fever  HENT: Negative for ear pain, sinus pressure, sinus pain and sore throat  Eyes: Negative for pain and redness  Respiratory: Negative for cough, chest tightness and shortness of breath  Cardiovascular: Negative for chest pain, palpitations and leg swelling  Gastrointestinal: Negative for abdominal pain, blood in stool, constipation, diarrhea and nausea  Genitourinary: Negative for flank pain, frequency and hematuria  Musculoskeletal: Positive for back pain  Negative for joint swelling  Low back pain   Skin: Negative for rash  Neurological: Negative for dizziness, numbness and headaches  Hematological: Does not bruise/bleed easily           Objective:  Vitals:    10/17/19 1253   BP: 142/98   BP Location: Left arm   Patient Position: Sitting   Cuff Size: Large   Pulse: 64   Resp: 16   Temp: Fatmata Yadira ) 97 2 °F (36 2 °C)   TempSrc: Tympanic   SpO2: 95%   Weight: 110 kg (242 lb)   Height: 5' 5" (1 651 m)      Physical Exam   Constitutional: He is oriented to person, place, and time  He appears well-developed and well-nourished  HENT:   Head: Normocephalic  Right Ear: External ear normal    Left Ear: External ear normal    Eyes: Conjunctivae and EOM are normal  Right eye exhibits no discharge  Left eye exhibits no discharge  Neck: No JVD present  Cardiovascular: Normal rate, regular rhythm and normal heart sounds  Exam reveals no gallop  No murmur heard  Pulmonary/Chest: Effort normal  No respiratory distress  He has no wheezes  He has no rales  He exhibits no tenderness  Abdominal: He exhibits no mass  There is no tenderness  There is no rebound  Musculoskeletal: He exhibits tenderness  He exhibits no edema  Positive tenderness in the lumbar spine worse in the right side leg raise test in the right side is positive   Neurological: He is alert and oriented to person, place, and time  Skin: No rash noted  No erythema  Assessment/Plan:    Degenerative disc disease, lumbar  Acute on chronic back pain will increase the gabapentin to 300 mg 4 times a day will give him Medrol pack to take it as directed on the pack we discussed the patient important lose weight proper postural will refer the patient to physical therapy and will refer the patient to Pain Management patient will have Toradol shot for the pain today patient tolerated well without side effect       Diagnoses and all orders for this visit:    Degenerative disc disease, lumbar  -     methylPREDNISolone 4 MG tablet therapy pack; Use as directed on package  -     gabapentin (NEURONTIN) 300 mg capsule; Take 1 capsule (300 mg total) by mouth 4 (four) times a day  -     Ambulatory referral to Pain Management; Future  -     Ambulatory referral to Physical Therapy;  Future  -     ketorolac (TORADOL) injection 30 mg    Controlled type 2 diabetes mellitus without complication, without long-term current use of insulin (HCC)  -     lisinopril (ZESTRIL) 5 mg tablet;  Take 1 tablet (5 mg total) by mouth daily

## 2019-10-27 ENCOUNTER — APPOINTMENT (EMERGENCY)
Dept: RADIOLOGY | Facility: HOSPITAL | Age: 49
End: 2019-10-27
Payer: COMMERCIAL

## 2019-10-27 ENCOUNTER — HOSPITAL ENCOUNTER (EMERGENCY)
Facility: HOSPITAL | Age: 49
End: 2019-10-28
Attending: EMERGENCY MEDICINE
Payer: COMMERCIAL

## 2019-10-27 ENCOUNTER — APPOINTMENT (EMERGENCY)
Dept: CT IMAGING | Facility: HOSPITAL | Age: 49
End: 2019-10-27
Payer: COMMERCIAL

## 2019-10-27 DIAGNOSIS — T14.91XA SUICIDE ATTEMPT (HCC): Primary | ICD-10-CM

## 2019-10-27 DIAGNOSIS — T50.902A POLYSUBSTANCE OVERDOSE, INTENTIONAL SELF-HARM, INITIAL ENCOUNTER (HCC): ICD-10-CM

## 2019-10-27 LAB
ALBUMIN SERPL BCP-MCNC: 4.2 G/DL (ref 3.5–5)
ALP SERPL-CCNC: 106 U/L (ref 46–116)
ALT SERPL W P-5'-P-CCNC: 63 U/L (ref 12–78)
ANION GAP SERPL CALCULATED.3IONS-SCNC: 9 MMOL/L (ref 4–13)
APAP SERPL-MCNC: <2 UG/ML (ref 10–20)
AST SERPL W P-5'-P-CCNC: 35 U/L (ref 5–45)
ATRIAL RATE: 85 BPM
ATRIAL RATE: 95 BPM
BASOPHILS # BLD AUTO: 0.03 THOUSANDS/ΜL (ref 0–0.1)
BASOPHILS NFR BLD AUTO: 1 % (ref 0–1)
BILIRUB SERPL-MCNC: 0.79 MG/DL (ref 0.2–1)
BUN SERPL-MCNC: 15 MG/DL (ref 5–25)
CALCIUM SERPL-MCNC: 9.6 MG/DL (ref 8.3–10.1)
CHLORIDE SERPL-SCNC: 100 MMOL/L (ref 100–108)
CK MB SERPL-MCNC: 1.8 NG/ML (ref 0–5)
CK MB SERPL-MCNC: <1 % (ref 0–2.5)
CK SERPL-CCNC: 290 U/L (ref 39–308)
CO2 SERPL-SCNC: 28 MMOL/L (ref 21–32)
CREAT SERPL-MCNC: 1.26 MG/DL (ref 0.6–1.3)
EOSINOPHIL # BLD AUTO: 0.01 THOUSAND/ΜL (ref 0–0.61)
EOSINOPHIL NFR BLD AUTO: 0 % (ref 0–6)
ERYTHROCYTE [DISTWIDTH] IN BLOOD BY AUTOMATED COUNT: 13.4 % (ref 11.6–15.1)
ETHANOL SERPL-MCNC: <3 MG/DL (ref 0–3)
GFR SERPL CREATININE-BSD FRML MDRD: 67 ML/MIN/1.73SQ M
GLUCOSE SERPL-MCNC: 142 MG/DL (ref 65–140)
HCT VFR BLD AUTO: 48.7 % (ref 36.5–49.3)
HGB BLD-MCNC: 15.2 G/DL (ref 12–17)
IMM GRANULOCYTES # BLD AUTO: 0.02 THOUSAND/UL (ref 0–0.2)
IMM GRANULOCYTES NFR BLD AUTO: 0 % (ref 0–2)
LACTATE SERPL-SCNC: 1.4 MMOL/L (ref 0.5–2)
LYMPHOCYTES # BLD AUTO: 1.87 THOUSANDS/ΜL (ref 0.6–4.47)
LYMPHOCYTES NFR BLD AUTO: 32 % (ref 14–44)
MCH RBC QN AUTO: 26.6 PG (ref 26.8–34.3)
MCHC RBC AUTO-ENTMCNC: 31.2 G/DL (ref 31.4–37.4)
MCV RBC AUTO: 85 FL (ref 82–98)
MONOCYTES # BLD AUTO: 0.5 THOUSAND/ΜL (ref 0.17–1.22)
MONOCYTES NFR BLD AUTO: 9 % (ref 4–12)
NEUTROPHILS # BLD AUTO: 3.43 THOUSANDS/ΜL (ref 1.85–7.62)
NEUTS SEG NFR BLD AUTO: 58 % (ref 43–75)
NRBC BLD AUTO-RTO: 0 /100 WBCS
P AXIS: 46 DEGREES
P AXIS: 49 DEGREES
PLATELET # BLD AUTO: 305 THOUSANDS/UL (ref 149–390)
PMV BLD AUTO: 9.4 FL (ref 8.9–12.7)
POTASSIUM SERPL-SCNC: 3.8 MMOL/L (ref 3.5–5.3)
PR INTERVAL: 134 MS
PR INTERVAL: 134 MS
PROT SERPL-MCNC: 8.7 G/DL (ref 6.4–8.2)
QRS AXIS: -6 DEGREES
QRS AXIS: 2 DEGREES
QRSD INTERVAL: 70 MS
QRSD INTERVAL: 72 MS
QT INTERVAL: 330 MS
QT INTERVAL: 346 MS
QTC INTERVAL: 411 MS
QTC INTERVAL: 414 MS
RBC # BLD AUTO: 5.72 MILLION/UL (ref 3.88–5.62)
SODIUM SERPL-SCNC: 137 MMOL/L (ref 136–145)
T WAVE AXIS: 2 DEGREES
T WAVE AXIS: 22 DEGREES
TROPONIN I SERPL-MCNC: <0.02 NG/ML
TROPONIN I SERPL-MCNC: <0.02 NG/ML
VENTRICULAR RATE: 85 BPM
VENTRICULAR RATE: 95 BPM
WBC # BLD AUTO: 5.86 THOUSAND/UL (ref 4.31–10.16)

## 2019-10-27 PROCEDURE — 80329 ANALGESICS NON-OPIOID 1 OR 2: CPT | Performed by: EMERGENCY MEDICINE

## 2019-10-27 PROCEDURE — 85025 COMPLETE CBC W/AUTO DIFF WBC: CPT | Performed by: EMERGENCY MEDICINE

## 2019-10-27 PROCEDURE — 99285 EMERGENCY DEPT VISIT HI MDM: CPT | Performed by: EMERGENCY MEDICINE

## 2019-10-27 PROCEDURE — 99449 NTRPROF PH1/NTRNET/EHR 31/>: CPT | Performed by: EMERGENCY MEDICINE

## 2019-10-27 PROCEDURE — 70450 CT HEAD/BRAIN W/O DYE: CPT

## 2019-10-27 PROCEDURE — 93010 ELECTROCARDIOGRAM REPORT: CPT | Performed by: INTERNAL MEDICINE

## 2019-10-27 PROCEDURE — 84484 ASSAY OF TROPONIN QUANT: CPT | Performed by: EMERGENCY MEDICINE

## 2019-10-27 PROCEDURE — 80307 DRUG TEST PRSMV CHEM ANLYZR: CPT | Performed by: EMERGENCY MEDICINE

## 2019-10-27 PROCEDURE — 71046 X-RAY EXAM CHEST 2 VIEWS: CPT

## 2019-10-27 PROCEDURE — 80053 COMPREHEN METABOLIC PANEL: CPT | Performed by: EMERGENCY MEDICINE

## 2019-10-27 PROCEDURE — 82550 ASSAY OF CK (CPK): CPT | Performed by: EMERGENCY MEDICINE

## 2019-10-27 PROCEDURE — 83605 ASSAY OF LACTIC ACID: CPT | Performed by: EMERGENCY MEDICINE

## 2019-10-27 PROCEDURE — 36415 COLL VENOUS BLD VENIPUNCTURE: CPT | Performed by: EMERGENCY MEDICINE

## 2019-10-27 PROCEDURE — 99285 EMERGENCY DEPT VISIT HI MDM: CPT

## 2019-10-27 PROCEDURE — 80320 DRUG SCREEN QUANTALCOHOLS: CPT | Performed by: EMERGENCY MEDICINE

## 2019-10-27 PROCEDURE — 93005 ELECTROCARDIOGRAM TRACING: CPT

## 2019-10-27 PROCEDURE — 82553 CREATINE MB FRACTION: CPT | Performed by: EMERGENCY MEDICINE

## 2019-10-27 RX ORDER — OLANZAPINE 5 MG/1
10 TABLET, ORALLY DISINTEGRATING ORAL ONCE
Status: COMPLETED | OUTPATIENT
Start: 2019-10-27 | End: 2019-10-27

## 2019-10-27 RX ADMIN — OLANZAPINE 10 MG: 5 TABLET, ORALLY DISINTEGRATING ORAL at 19:29

## 2019-10-27 NOTE — ED NOTES
Dr Dana West agreeable to BW , no IV and PO fluids for pt at this time     Vedia Dakin, RN  10/27/19 4150

## 2019-10-27 NOTE — LETTER
[facility] [address]  [address] [facility] [address]  Dept: [phone]      EMTALA TRANSFER CONSENT    NAME Samantha WALKER 1970                              MRN [id number]    I have been informed of my rights regarding examination, treatment, and transfer   by Dr Eric Arias MD    Benefits: Continuity of care    Risks: Potential for delay in receiving treatment      Consent for Transfer:  I acknowledge that my medical condition has been evaluated and explained to me by the emergency department physician or other qualified medical person and/or my attending physician, who has recommended that I be transferred to the service of  Accepting Physician: Jacqueline Teran at [address], [address] : [address] [place]  The above potential benefits of such transfer, the potential risks associated with such transfer, and the probable risks of not being transferred have been explained to me, and I fully understand them  The doctor has explained that, in my case, the benefits of transfer outweigh the risks  I agree to be transferred  I authorize the performance of emergency medical procedures and treatments upon me in both transit and upon arrival at the receiving facility  Additionally, I authorize the release of any and all medical records to the receiving facility and request they be transported with me, if possible  I understand that the safest mode of transportation during a medical emergency is an ambulance and that the Hospital advocates the use of this mode of transport  Risks of traveling to the receiving facility by car, including absence of medical control, life sustaining equipment, such as oxygen, and medical personnel has been explained to me and I fully understand them  (UBALDO CORRECT BOX BELOW)  [ X ]  I consent to the stated transfer and to be transported by ambulance/helicopter    [  ]  I consent to the stated transfer, but refuse transportation by ambulance and accept full responsibility for my transportation by car  I understand the risks of non-ambulance transfers and I exonerate the Hospital and its staff from any deterioration in my condition that results from this refusal     X___________________________________________    DATE  10/28/19  TIME________  Signature of patient or legally responsible individual signing on patient behalf           RELATIONSHIP TO PATIENT_________________________          Provider Certification    NAME Shruthi WALKER 1970                              MRN [id number]    A medical screening exam was performed on the above named patient  Based on the examination:    Condition Necessitating Transfer The primary encounter diagnosis was Suicide attempt [facility])  A diagnosis of Polysubstance overdose, intentional self-harm, initial encounter [facility]) was also pertinent to this visit  Patient Condition: The patient has been stabilized such that within reasonable medical probability, no material deterioration of the patient condition or the condition of the unborn child(amadou) is likely to result from the transfer    Reason for Transfer: Level of Care needed not available at this facility    Transfer Requirements: [facility] [address] [place]   · Space available and qualified personnel available for treatment as acknowledged by Darian Louise  · Agreed to accept transfer and to provide appropriate medical treatment as acknowledged by       eMr Rodriguez  · Appropriate medical records of the examination and treatment of the patient are provided at the time of transfer   [address], [address] me  · Transfer will be performed by qualified personnel from [facility]  and appropriate transfer equipment as required, including the use of necessary and appropriate life support measures      Provider Certification: I have examined the patient and explained the following risks and benefits of being transferred/refusing transfer to the patient/family:  General risk, such as traffic hazards, adverse weather conditions, rough terrain or turbulence, possible failure of equipment (including vehicle or aircraft), or consequences of actions of persons outside the control of the transport personnel      Based on these reasonable risks and benefits to the patient and/or the unborn child(amadou), and based upon the information available at the time of the patients examination, I certify that the medical benefits reasonably to be expected from the provision of appropriate medical treatments at another medical facility outweigh the increasing risks, if any, to the individuals medical condition, and in the case of labor to the unborn child, from effecting the transfer      X____________________________________________ DATE 10/28/19        TIME_______      ORIGINAL - SEND TO MEDICAL RECORDS   COPY - SEND WITH PATIENT DURING TRANSFER

## 2019-10-27 NOTE — LETTER
Section I - General Information    Name of Patient: Corby Norwood                 : 1970    Medicare #:____________________  Transport Date: 10/28/19 (PCS is valid for round trips on this date and for all repetitive trips in the 60-day range as noted below )  Origin: [place] [address]                                                         Destination:________________________________________________  Is the pt's stay covered under Medicare Part A (PPS/DRG)     (_) YES  (_) NO  Closest appropriate facility?  (_) YES  (_) NO  If no, why is transport to more distant facility required?________________________  If hosp-hosp transfer, describe services needed at 2nd facility not available at 1st facility? _________________________________  If hospice pt, is this transport related to pt's terminal illness? (_) YES (_) NO Describe____________________________________    Section II - Medical Necessity Questionnaire  Ambulance transportation is medically necessary only if other means of transport are contraindicated or would be potentially harmful to the patient  To meet this requirement, the patient must either be "bed confined" or suffer from a condition such that transport by means other than ambulance is contraindicated by the patient's condition   The following questions must be answered by the medical professional signing below for this form to be valid:    1)  Describe the MEDICAL CONDITION (physical and/or mental) of this patient AT [address] [place] [address] that requires the patient to be transported in an ambulance and why transport by other means is contraindicated by the patient's condition:__________________________________________________________________________________________________    2) Is the patient "bed confined" as defined below?     (_) YES  (_) NO  To be "be confined" the patient must satisfy all three of the following conditions: (1) unable to get up from bed without Assistance; AND (2) unable to ambulate; AND (3) unable to sit in a chair or wheelchair  3) Can this patient safely be transported by car or wheelchair [facility] (i e , seated during transport without a medical attendant or monitoring)?   (_) YES  (_) NO    4) In addition to completing questions 1-3 above, please check any of the following conditions that apply*:  *Note: supporting documentation for any boxes checked must be maintained in the patient's medical records  (_)Contractures   (_)Non-Healed Fractures  (_)Patient is confused (_)Patient is comatose (_)Moderate/severe pain on movement (_)Danger to self/others  (_)IV meds/fluids required (_)Patient is combative(_)Need or possible need for restraints (_)DVT requires elevation of lower extremity  (_)Medical attendant required (_)Requires oxygen-unable to self administer (_)Special handling/isolation/infection control precautions required (_)Unable to tolerate seated position for time needed to transport (_)Hemodynamic monitoring required en route (_)Unable to sit in a chair or wheelchair due to decubitus ulcers or other wounds (_)Cardiac monitoring required en route (_)Morbid obesity requires additional personnel/equipment to safely handle patient (_)Orthopedic device (backboard, halo, pins, traction, brace, wedge, etc,) requiring special handling during transport (_)Other(specify)_______________________________________________    Section III - Signature of Physician or Healthcare Professional  I certify that the above information is true and correct based on my evaluation of this patient, and represent that the patient requires transport by ambulance and that other forms of transport are contraindicated   I understand that this information will be used by the Centers for Medicare and Medicaid Services (CMS) to support the determination of medical necessity for ambulance services, and I represent that I have personal knowledge of the patient's condition at time of transport  (_) If this box is checked, I also certify that the patient is physically or mentally incapable of signing the ambulance service's claim and that the institution with which I am affiliated has furnished care, services, or assistance to the patient  My signature below is made on behalf of the patient pursuant to 42 CFR §424 36(b)(4)  In accordance with 42 CFR §424 37, the specific reason(s) that the patient is physically or mentally incapable of signing the claim form is as follows: _________________________________________________________________________________________________________      Signature of Physician* or Healthcare Professional______________________________________________________________  Signature Date 10/28/19 (For scheduled repetitive transports, this form is not valid for transports performed more than 60 days after this date)    Printed Name & Credentials of Physician or Healthcare Professional (MD, DO, RN, etc )________________________________  *Form must be signed by patient's attending physician for scheduled, repetitive transports   For non-repetitive, unscheduled ambulance transports, if unable to obtain the signature of the attending physician, any of the following may sign (choose appropriate option below)  (_) Physician Assistant (_)  Clinical Nurse Specialist (_)  Registered Nurse  (_)  Nurse Practitioner  (_) Discharge Planner

## 2019-10-27 NOTE — ED NOTES
Pt's wife and brother in waiting room per registration  Asking if they can come to see pt  RN asks patient and pt reports it is OK for both of them to come to room to see him  PT at xray   Will allow family back when pt returns to room     Kaela Harvey RN  10/27/19 0322

## 2019-10-27 NOTE — ED NOTES
Dr Rafa Moran at bedside  Pt reporting that he wants to leave and that he lied about taking the meds at home for OD  Pt reporting that he will not let staff put IV in, if we do he will pull it back out again  Pt agrees to allow for BW with straight stick    Pt cooperative at this time with REMBERTO Andres  10/27/19 5422

## 2019-10-27 NOTE — ED NOTES
Pt continually reporting that he wants to die and does not want to be here  Keeps trying to report that he wants to put the cords that are attached to him around his neck    PT pulls out IV at this time     Scot Dean RN  10/27/19 9009

## 2019-10-27 NOTE — ED PROVIDER NOTES
History  Chief Complaint   Patient presents with    Overdose - Intentional     Pt brought in by EMS for intentional OD  Reports that he took Lisinopril ,Lipitor and Ibuprofen to OD 3 hrs ago  4th attempt at suicide by OD per pt and EMS  Reports domestic issues are his trigger for SI       A 40-year-old male with past medical history of diabetes, hyperlipidemia & hypertension; presents after a polypharmacy intentional overdose  Patient reports having an argument with his girlfriend, and then developing suicidal thoughts  Patient states he took 4 tablets of ibuprofen, along with metformin, lisinopril and Lipitor  Patient reports the metformin, lisinopril and Lipitor are prescribed to him however he is unsure exactly how many tablets he took  Patient stating he took at least half a bottle of each  Patient reports taking the medication 4 hours ago  Patient states his girlfriend called 911 after she found him on the floor  Patient is unsure how he ended up on the floor  Patient currently complains of chest pain and shortness of breath  Patient states chest pain and shortness of breath began after taking the medication  Patient otherwise denies fever, chills, headache, abdominal pain, nausea, vomiting, diarrhea, peripheral edema and rashes  Patient is repeatedly stating he wants to kill himself  A/P:  Suicide attempt, with polypharmacy overdose  Patient reports taking ibuprofen, metformin, lisinopril and Lipitor  Patient does appear clinically intoxicated however denies alcohol use  Will check lab work including troponin and EKG, due to the onset of chest pain after ingestion  Will also obtain CT head given that patient was found on the floor and is unsure how he fell  Will plan discussed with Toxicology prior to medical clearance for crisis evaluation, as patient is willing to sign a 201 for inpatient psychiatric care        History provided by:  Patient and EMS personnel  Overdose - Intentional Associated symptoms: chest pain and shortness of breath        Prior to Admission Medications   Prescriptions Last Dose Informant Patient Reported? Taking? Blood Glucose Monitoring Suppl (FREESTYLE FREEDOM LITE) w/Device KIT  Self No No   Sig: USED DEVICE TO CHECK BLOOD SUGAR ONCE DAILY  Blood Glucose Monitoring Suppl (FREESTYLE LITE) CHARAN  Self Yes No   Sig: Use as directed   JANUVIA 100 MG tablet  Self No No   Sig: Take 1 tablet (100 mg total) by mouth every morning   Lancets (FREESTYLE) lancets  Self No No   Sig: by Other route 3 (three) times a day Use as instructed   PHARMACIST CHOICE ALCOHOL 70 % PADS  Self Yes No   Sig: USE 3 TIMES A DAY TO 4 TIMES A DAY   Vitamin D, Ergocalciferol, 2000 units CAPS  Self No No   Sig: Take 1 tablet by mouth daily   atorvastatin (LIPITOR) 20 mg tablet  Self No No   Sig: Take 1 tablet (20 mg total) by mouth daily   gabapentin (NEURONTIN) 300 mg capsule   No No   Sig: Take 1 capsule (300 mg total) by mouth 4 (four) times a day   glucose blood (FREESTYLE LITE) test strip  Self No No   Sig: Use trip to test blood sugar daily     glucose monitoring kit (FREESTYLE) monitoring kit  Self No No   Si each by Does not apply route 3 (three) times a day   lisinopril (ZESTRIL) 5 mg tablet   No No   Sig: Take 1 tablet (5 mg total) by mouth daily   magnesium oxide (MAG-OX) 400 mg tablet  Self Yes No   Sig: Take 1 tablet by mouth daily   metFORMIN (GLUCOPHAGE) 1000 MG tablet  Self No No   Sig: Take 1 tablet (1,000 mg total) by mouth daily with breakfast   methylPREDNISolone 4 MG tablet therapy pack   No No   Sig: Use as directed on package   topiramate (TOPAMAX) 25 mg tablet  Self No No   Sig: By oral route take 1 tablet twice daily or as directed      Facility-Administered Medications: None       Past Medical History:   Diagnosis Date    Degenerative disc disease, lumbar 10/17/2019    [facility] [address] [facility]     Hyperlipidemia     Hypertension     Psychosis [facility] [facility]     Visual impairment        Past Surgical History:   Procedure Laterality Date    ELEVATION OF DEPRESSED SKULL FRACTURE         Family History   Problem Relation Age of Onset    Diabetes Father     Heart failure Mother     No Known Problems Sister     No Known Problems Brother      I have reviewed and agree with the history as documented  Social History     Tobacco Use    Smoking status: Never Smoker    Smokeless tobacco: Never Used   Substance Use Topics    Alcohol use: No     Frequency: Never     Binge frequency: Never    Drug use: No        Review of Systems   Respiratory: Positive for shortness of breath  Cardiovascular: Positive for chest pain  Psychiatric/Behavioral: Positive for self-injury and suicidal ideas  All other systems reviewed and are negative  Physical Exam  Physical Exam  General Appearance: alert and oriented, nad, appears clinically intoxicated  Skin:  Warm, dry, intact  HEENT: atraumatic, normocephalic  Neck: Supple, trachea midline  Cardiac: RRR; no murmurs, rub, gallops  Pulmonary: lungs CTAB; no wheezes, rales, rhonchi  Gastrointestinal: abdomen soft, nontender, nondistended; no guarding or rebound tenderness; good bowel sounds, no mass or bruits  Extremities:  no pedal edema, 2+ pulses; no calf tenderness, no clubbing, no cyanosis  Neuro:  no focal motor or sensory deficits, CN 2-12 grossly intact  Psych:  Normal mood and affect, normal judgement and insight    Suicidal       Vital Signs  ED Triage Vitals   Temperature Pulse Respirations Blood Pressure SpO2   10/27/19 1649 10/27/19 1648 10/27/19 1648 10/27/19 1648 10/27/19 1648   98 4 °F (36 9 °C) 95 18 (!) 155/101 91 %      Temp Source Heart Rate Source Patient Position - Orthostatic VS BP Location FiO2 (%)   10/27/19 1649 10/27/19 1648 10/27/19 1648 10/27/19 1648 --   Oral Monitor Lying Right arm       Pain Score       --                  Vitals:    10/27/19 1648 10/27/19 1858 10/27/19 2016 10/27/19 2100   BP: (!) 155/101 137/81 122/75    Pulse: 95 92 88 96   Patient Position - Orthostatic VS: Lying Lying Lying          Visual Acuity      ED Medications  Medications   sodium chloride 0 9 % bolus 1,000 mL (1,000 mL Intravenous Not Given 10/27/19 1741)   OLANZapine (ZyPREXA ZYDIS) dispersible tablet 10 mg (10 mg Oral Given 10/27/19 1929)       Diagnostic Studies  Results Reviewed     Procedure Component Value Units Date/Time    Rapid drug screen, urine [[id number]]     Lab Status:  No result Specimen:  Urine     Troponin I [[id number]]  (Normal) Collected:  10/27/19 2008    Lab Status:  Final result Specimen:  Blood from Arm, Right Updated:  10/27/19 2034     Troponin I <0 02 ng/mL     Acetaminophen level-"If concentration is detectable, please discuss with medical  on call " [[id number]]  (Abnormal) Collected:  10/27/19 1731    Lab Status:  Final result Specimen:  Blood from Arm, Right Updated:  10/27/19 1928     Acetaminophen Level <2 ug/mL     Lactic acid, plasma [[id number]]  (Normal) Collected:  10/27/19 1852    Lab Status:  Final result Specimen:  Blood from Arm, Left Updated:  10/27/19 1913     LACTIC ACID 1 4 mmol/L     Narrative:       Result may be elevated if tourniquet was used during collection      CKMB [[id number]]  (Normal) Collected:  10/27/19 1731    Lab Status:  Final result Specimen:  Blood from Arm, Right Updated:  10/27/19 1814     CK-MB Index <1 0 %      CK-MB 1 8 ng/mL     CK Total with Reflex CKMB [[id number]]  (Normal) Collected:  10/27/19 1731    Lab Status:  Final result Specimen:  Blood from Arm, Right Updated:  10/27/19 1814     Total  U/L     Troponin I [[id number]]  (Normal) Collected:  10/27/19 1731    Lab Status:  Final result Specimen:  Blood from Arm, Right Updated:  10/27/19 1756     Troponin I <0 02 ng/mL     Comprehensive metabolic panel [[id number]]  (Abnormal) Collected:  10/27/19 1731    Lab Status:  Final result Specimen:  Blood from Arm, Right Updated:  10/27/19 1755     Sodium 137 mmol/L      Potassium 3 8 mmol/L      Chloride 100 mmol/L      CO2 28 mmol/L      ANION GAP 9 mmol/L      BUN 15 mg/dL      Creatinine 1 26 mg/dL      Glucose 142 mg/dL      Calcium 9 6 mg/dL      AST 35 U/L      ALT 63 U/L      Alkaline Phosphatase 106 U/L      Total Protein 8 7 g/dL      Albumin 4 2 g/dL      Total Bilirubin 0 79 mg/dL      eGFR 67 ml/min/1 73sq m     Narrative:       [place] guidelines for Chronic Kidney Disease (CKD):     Stage 1 with normal or high GFR (GFR > 90 mL/min/1 73 square meters)    Stage 2 Mild CKD (GFR = 60-89 mL/min/1 73 square meters)    Stage 3A Moderate CKD (GFR = 45-59 mL/min/1 73 square meters)    Stage 3B Moderate CKD (GFR = 30-44 mL/min/1 73 square meters)    Stage 4 Severe CKD (GFR = 15-29 mL/min/1 73 square meters)    Stage 5 End Stage CKD (GFR <15 mL/min/1 73 square meters)  Note: GFR calculation is accurate only with a steady state creatinine    Ethanol [[id number]]  (Normal) Collected:  10/27/19 1731    Lab Status:  Final result Specimen:  Blood from Arm, Right Updated:  10/27/19 1754     Ethanol Lvl <3 mg/dL     CBC and differential [[id number]]  (Abnormal) Collected:  10/27/19 1731    Lab Status:  Final result Specimen:  Blood from Arm, Right Updated:  10/27/19 1740     WBC 5 86 Thousand/uL      RBC 5 72 Million/uL      Hemoglobin 15 2 g/dL      Hematocrit 48 7 %      MCV 85 fL      MCH 26 6 pg      MCHC 31 2 g/dL      RDW 13 4 %      MPV 9 4 fL      Platelets 827 Thousands/uL      nRBC 0 /100 WBCs      Neutrophils Relative 58 %      Immat GRANS % 0 %      Lymphocytes Relative 32 %      Monocytes Relative 9 %      Eosinophils Relative 0 %      Basophils Relative 1 %      Neutrophils Absolute 3 43 Thousands/µL      Immature Grans Absolute 0 02 Thousand/uL      Lymphocytes Absolute 1 87 Thousands/µL      Monocytes Absolute 0 50 Thousand/µL      Eosinophils Absolute 0 01 Thousand/µL      Basophils Absolute 0 03 Thousands/µL CT head without contrast   Final Result by Dalia Kaplan DO (10/27 1844)      No acute intracranial abnormality  Workstation performed: [id number]         XR chest 2 views   ED Interpretation by An Rueda DO (10/27 4267)   No acute disease      Final Result by Deepthi Lopez MD (10/27 1848)      No acute cardiopulmonary disease  Workstation performed: [id number]                    Procedures  Procedures   ECG 12 Lead Documentation  Date/Time: today/date: 10/27/2019  Performed by: Fernanda Bright    ECG reviewed by me, the ED Provider: yes    Patient location:  ED   Previous ECG:  Compared to current, no change   Rate:  85  ECG rate assessment: normal    Rhythm: sinus rhythm    Ectopy:  none    QRS axis:  Normal  Intervals: normal   Q waves: None   ST segments:  Normal  T waves: inverted in III     Impression: NSR with isolated T wave inversion in III      ECG 12 Lead Documentation  Date/Time: today/date: 10/27/2019, delta obtained at 19:50  Performed by: Fernanda Bright    ECG reviewed by me, the ED Provider: yes    Patient location:  ED   Previous ECG:  Compared to current, no change   Rate:  95  ECG rate assessment: normal    Rhythm: sinus rhythm    Ectopy:  none    QRS axis:  Normal  Intervals: normal   Q waves: None   ST segments:  Normal  T waves: inverted in III     Impression: NSR with isolated T wave inversion in III         ED Course  ED Course as of Oct 27 2344   Sun Oct 27, 2019   1732 Called to bedside as patient was being uncooperative  Pt refusing IV and blood draw  Pt also threatening to wrap tele monitor leads around neck  On re-evaluation, pt repeatedly stating "just let me go" and "I want to die"  Discussed with patient that we are here to help him and we must obtain labs prior to crisis evaluation  Pt then stated, "I didn't take the medications, I want to go home"    Again discussed with patient that we can not discharge him home and that a work up must be completed now  Pt then willingly changed into paper scrubs  Pt allowing labs to be drawn, however no IV  Will allow PO hydration at this time  1808 Pt more cooperative at this time  Tolerating PO  Tox paged  [address] within normal limits      1833 Per tox, coma panel and lactate will need to be completed  Will obtain now  1909 Imaging negative for acute findings      1916 Pt complaining of headache, will hold on tylenol until level returns  Also given reported OD of ibuprofen, will hold on additional NSAIDs  Will give ODT zyprexa for headache  1942 Tylenol and lactate within normal limits  Given chest pain and cardiac risk factors, will obtain delta trop and EKG for further ACS rule out  Will discuss with tox for medical clearance  2013 Spoke with Dr Ranjit Fairbanks, tox on call  Reviewed patient's history and lab results  Pt can be cleared from a tox/ingestion stand point  2050 Deltra trop and EKG stable from prior  Pt now medically cleared for crisis evaluation  Pt able to ambulate without difficulty        2229 Pt signed 201            HEART Risk Score      Most Recent Value   History  0 Filed at: 10/27/2019 1942   ECG  0 Filed at: 10/27/2019 1942   Age  1 Filed at: 10/27/2019 1942   Risk Factors  2 Filed at: 10/27/2019 1942   Troponin  0 Filed at: 10/27/2019 1942   Heart Score Risk Calculator   History  0 Filed at: 10/27/2019 1942   ECG  0 Filed at: 10/27/2019 1942   Age  1 Filed at: 10/27/2019 1942   Risk Factors  2 Filed at: 10/27/2019 1942   Troponin  0 Filed at: 10/27/2019 1942   HEART Score  3 Filed at: 10/27/2019 1942   HEART Score  3 Filed at: 10/27/2019 1942                            MDM    Disposition  Final diagnoses:   Suicide attempt [facility])   Polysubstance overdose, intentional self-harm, initial encounter ([facility] [address] ) - metformin, lipitor, lisinopril, ibuprofen     Time reflects when diagnosis was documented in both MDM as applicable and the Disposition within this note Time User Action Codes Description Comment    10/27/2019 10:07 PM Michael, Hetal Lugo Suicide attempt ([facility] )     10/27/2019 10:08 PM Shasta Rodriguez Add [T50 262A] Polysubstance overdose, intentional self-harm, initial encounter ([place] [address] )     10/27/2019 10:08 PM Shasta Rodriguez Modify [T50 902A] Polysubstance overdose, intentional self-harm, initial encounter ([place] [address] ) metformin, lipitor, lisinopril, ibuprofen      ED Disposition     ED Disposition Condition Date/Time Comment    Transfer to [address] [place] [address] Oct 27, 2019 10:29 PM         MD Documentation      Most Recent Value   Sending MD Rodriguez      Follow-up Information    None         Patient's Medications   Discharge Prescriptions    No medications on file     No discharge procedures on file      ED Provider  Electronically Signed by           Gill Echevarria DO  10/27/19 3812

## 2019-10-28 ENCOUNTER — HOSPITAL ENCOUNTER (INPATIENT)
Facility: HOSPITAL | Age: 49
LOS: 7 days | Discharge: HOME | DRG: 885 | End: 2019-11-04
Attending: PSYCHIATRY & NEUROLOGY | Admitting: PSYCHIATRY & NEUROLOGY
Payer: COMMERCIAL

## 2019-10-28 VITALS
BODY MASS INDEX: 40.36 KG/M2 | HEART RATE: 84 BPM | TEMPERATURE: 98 F | OXYGEN SATURATION: 95 % | RESPIRATION RATE: 18 BRPM | DIASTOLIC BLOOD PRESSURE: 83 MMHG | WEIGHT: 242.51 LBS | SYSTOLIC BLOOD PRESSURE: 126 MMHG

## 2019-10-28 DIAGNOSIS — E11.8 CONTROLLED TYPE 2 DIABETES MELLITUS WITH COMPLICATION, WITHOUT LONG-TERM CURRENT USE OF INSULIN (HCC): ICD-10-CM

## 2019-10-28 DIAGNOSIS — T14.91XA SUICIDE ATTEMPT (HCC): ICD-10-CM

## 2019-10-28 DIAGNOSIS — G47.00 INSOMNIA, UNSPECIFIED TYPE: ICD-10-CM

## 2019-10-28 DIAGNOSIS — F25.9 SCHIZOAFFECTIVE DISORDER, UNSPECIFIED TYPE (HCC): Primary | ICD-10-CM

## 2019-10-28 DIAGNOSIS — E78.5 DYSLIPIDEMIA, GOAL LDL BELOW 70: ICD-10-CM

## 2019-10-28 DIAGNOSIS — E11.9 CONTROLLED TYPE 2 DIABETES MELLITUS WITHOUT COMPLICATION, WITHOUT LONG-TERM CURRENT USE OF INSULIN (HCC): ICD-10-CM

## 2019-10-28 LAB
AMPHETAMINES SERPL QL SCN: NEGATIVE
BARBITURATES UR QL: NEGATIVE
BENZODIAZ UR QL: NEGATIVE
COCAINE UR QL: NEGATIVE
GLUCOSE SERPL-MCNC: 159 MG/DL (ref 65–140)
GLUCOSE SERPL-MCNC: 192 MG/DL (ref 65–140)
METHADONE UR QL: NEGATIVE
OPIATES UR QL SCN: NEGATIVE
PCP UR QL: NEGATIVE
THC UR QL: NEGATIVE

## 2019-10-28 PROCEDURE — 82948 REAGENT STRIP/BLOOD GLUCOSE: CPT

## 2019-10-28 RX ORDER — HALOPERIDOL 5 MG
5 TABLET ORAL EVERY 6 HOURS PRN
Status: DISCONTINUED | OUTPATIENT
Start: 2019-10-28 | End: 2019-11-04 | Stop reason: HOSPADM

## 2019-10-28 RX ORDER — BENZTROPINE MESYLATE 1 MG/ML
1 INJECTION INTRAMUSCULAR; INTRAVENOUS EVERY 6 HOURS PRN
Status: CANCELLED | OUTPATIENT
Start: 2019-10-28

## 2019-10-28 RX ORDER — MAGNESIUM HYDROXIDE/ALUMINUM HYDROXICE/SIMETHICONE 120; 1200; 1200 MG/30ML; MG/30ML; MG/30ML
30 SUSPENSION ORAL EVERY 4 HOURS PRN
Status: CANCELLED | OUTPATIENT
Start: 2019-10-28

## 2019-10-28 RX ORDER — ACETAMINOPHEN 325 MG/1
975 TABLET ORAL EVERY 6 HOURS PRN
Status: CANCELLED | OUTPATIENT
Start: 2019-10-28

## 2019-10-28 RX ORDER — ACETAMINOPHEN 325 MG/1
975 TABLET ORAL EVERY 6 HOURS PRN
Status: DISCONTINUED | OUTPATIENT
Start: 2019-10-28 | End: 2019-11-04 | Stop reason: HOSPADM

## 2019-10-28 RX ORDER — OLANZAPINE 10 MG/1
10 INJECTION, POWDER, LYOPHILIZED, FOR SOLUTION INTRAMUSCULAR EVERY 8 HOURS PRN
Status: DISCONTINUED | OUTPATIENT
Start: 2019-10-28 | End: 2019-11-04 | Stop reason: HOSPADM

## 2019-10-28 RX ORDER — LORAZEPAM 1 MG/1
1 TABLET ORAL EVERY 6 HOURS PRN
Status: DISCONTINUED | OUTPATIENT
Start: 2019-10-28 | End: 2019-11-04 | Stop reason: HOSPADM

## 2019-10-28 RX ORDER — HYDROXYZINE 50 MG/1
50 TABLET, FILM COATED ORAL EVERY 6 HOURS PRN
Status: DISCONTINUED | OUTPATIENT
Start: 2019-10-28 | End: 2019-11-04 | Stop reason: HOSPADM

## 2019-10-28 RX ORDER — HYDROXYZINE HYDROCHLORIDE 25 MG/1
25 TABLET, FILM COATED ORAL EVERY 6 HOURS PRN
Status: DISCONTINUED | OUTPATIENT
Start: 2019-10-28 | End: 2019-11-04 | Stop reason: HOSPADM

## 2019-10-28 RX ORDER — LISINOPRIL 5 MG/1
5 TABLET ORAL DAILY
Status: CANCELLED | OUTPATIENT
Start: 2019-10-29

## 2019-10-28 RX ORDER — LORAZEPAM 1 MG/1
1 TABLET ORAL EVERY 6 HOURS PRN
Status: CANCELLED | OUTPATIENT
Start: 2019-10-28

## 2019-10-28 RX ORDER — ACETAMINOPHEN 325 MG/1
350 TABLET ORAL EVERY 6 HOURS PRN
Status: DISCONTINUED | OUTPATIENT
Start: 2019-10-28 | End: 2019-11-04 | Stop reason: HOSPADM

## 2019-10-28 RX ORDER — RISPERIDONE 1 MG/1
1 TABLET, ORALLY DISINTEGRATING ORAL
Status: CANCELLED | OUTPATIENT
Start: 2019-10-28

## 2019-10-28 RX ORDER — ACETAMINOPHEN 325 MG/1
650 TABLET ORAL EVERY 4 HOURS PRN
Status: CANCELLED | OUTPATIENT
Start: 2019-10-28

## 2019-10-28 RX ORDER — OLANZAPINE 10 MG/1
10 TABLET ORAL EVERY 8 HOURS PRN
Status: DISCONTINUED | OUTPATIENT
Start: 2019-10-28 | End: 2019-11-04 | Stop reason: HOSPADM

## 2019-10-28 RX ORDER — TRAZODONE HYDROCHLORIDE 50 MG/1
50 TABLET ORAL
Status: CANCELLED | OUTPATIENT
Start: 2019-10-28

## 2019-10-28 RX ORDER — HYDROXYZINE HYDROCHLORIDE 25 MG/1
25 TABLET, FILM COATED ORAL EVERY 6 HOURS PRN
Status: CANCELLED | OUTPATIENT
Start: 2019-10-28

## 2019-10-28 RX ORDER — BENZTROPINE MESYLATE 0.5 MG/1
1 TABLET ORAL EVERY 6 HOURS PRN
Status: CANCELLED | OUTPATIENT
Start: 2019-10-28

## 2019-10-28 RX ORDER — ACETAMINOPHEN 325 MG/1
350 TABLET ORAL EVERY 6 HOURS PRN
Status: CANCELLED | OUTPATIENT
Start: 2019-10-28

## 2019-10-28 RX ORDER — ACETAMINOPHEN 325 MG/1
650 TABLET ORAL EVERY 4 HOURS PRN
Status: DISCONTINUED | OUTPATIENT
Start: 2019-10-28 | End: 2019-11-04 | Stop reason: HOSPADM

## 2019-10-28 RX ORDER — ATORVASTATIN CALCIUM 20 MG/1
20 TABLET, FILM COATED ORAL
Status: DISCONTINUED | OUTPATIENT
Start: 2019-10-29 | End: 2019-11-04 | Stop reason: HOSPADM

## 2019-10-28 RX ORDER — LISINOPRIL 5 MG/1
5 TABLET ORAL DAILY
Status: DISCONTINUED | OUTPATIENT
Start: 2019-10-29 | End: 2019-11-04 | Stop reason: HOSPADM

## 2019-10-28 RX ORDER — TOPIRAMATE 25 MG/1
25 TABLET ORAL 2 TIMES DAILY
Status: DISCONTINUED | OUTPATIENT
Start: 2019-10-28 | End: 2019-10-28 | Stop reason: HOSPADM

## 2019-10-28 RX ORDER — BENZTROPINE MESYLATE 1 MG/1
1 TABLET ORAL EVERY 6 HOURS PRN
Status: DISCONTINUED | OUTPATIENT
Start: 2019-10-28 | End: 2019-11-04 | Stop reason: HOSPADM

## 2019-10-28 RX ORDER — OLANZAPINE 10 MG/1
10 INJECTION, POWDER, LYOPHILIZED, FOR SOLUTION INTRAMUSCULAR EVERY 8 HOURS PRN
Status: CANCELLED | OUTPATIENT
Start: 2019-10-28

## 2019-10-28 RX ORDER — OLANZAPINE 10 MG/1
10 TABLET ORAL EVERY 8 HOURS PRN
Status: CANCELLED | OUTPATIENT
Start: 2019-10-28

## 2019-10-28 RX ORDER — HALOPERIDOL 5 MG/ML
5 INJECTION INTRAMUSCULAR EVERY 6 HOURS PRN
Status: DISCONTINUED | OUTPATIENT
Start: 2019-10-28 | End: 2019-11-04 | Stop reason: HOSPADM

## 2019-10-28 RX ORDER — ATORVASTATIN CALCIUM 40 MG/1
20 TABLET, FILM COATED ORAL
Status: CANCELLED | OUTPATIENT
Start: 2019-10-29

## 2019-10-28 RX ORDER — LORAZEPAM 2 MG/ML
2 INJECTION INTRAMUSCULAR EVERY 6 HOURS PRN
Status: DISCONTINUED | OUTPATIENT
Start: 2019-10-28 | End: 2019-11-04 | Stop reason: HOSPADM

## 2019-10-28 RX ORDER — TRAZODONE HYDROCHLORIDE 50 MG/1
50 TABLET ORAL
Status: DISCONTINUED | OUTPATIENT
Start: 2019-10-28 | End: 2019-11-04 | Stop reason: HOSPADM

## 2019-10-28 RX ORDER — BENZTROPINE MESYLATE 1 MG/ML
1 INJECTION INTRAMUSCULAR; INTRAVENOUS EVERY 6 HOURS PRN
Status: DISCONTINUED | OUTPATIENT
Start: 2019-10-28 | End: 2019-11-04 | Stop reason: HOSPADM

## 2019-10-28 RX ORDER — LORAZEPAM 2 MG/ML
2 INJECTION INTRAMUSCULAR EVERY 6 HOURS PRN
Status: CANCELLED | OUTPATIENT
Start: 2019-10-28

## 2019-10-28 RX ORDER — RISPERIDONE 1 MG/1
1 TABLET, ORALLY DISINTEGRATING ORAL
Status: DISCONTINUED | OUTPATIENT
Start: 2019-10-28 | End: 2019-11-04 | Stop reason: HOSPADM

## 2019-10-28 RX ORDER — HALOPERIDOL 5 MG/ML
5 INJECTION INTRAMUSCULAR EVERY 6 HOURS PRN
Status: CANCELLED | OUTPATIENT
Start: 2019-10-28

## 2019-10-28 RX ORDER — HALOPERIDOL 5 MG
5 TABLET ORAL EVERY 6 HOURS PRN
Status: CANCELLED | OUTPATIENT
Start: 2019-10-28

## 2019-10-28 RX ORDER — HYDROXYZINE HYDROCHLORIDE 25 MG/1
50 TABLET, FILM COATED ORAL EVERY 6 HOURS PRN
Status: CANCELLED | OUTPATIENT
Start: 2019-10-28

## 2019-10-28 RX ORDER — MAGNESIUM HYDROXIDE/ALUMINUM HYDROXICE/SIMETHICONE 120; 1200; 1200 MG/30ML; MG/30ML; MG/30ML
30 SUSPENSION ORAL EVERY 4 HOURS PRN
Status: DISCONTINUED | OUTPATIENT
Start: 2019-10-28 | End: 2019-11-04 | Stop reason: HOSPADM

## 2019-10-28 RX ADMIN — TRAZODONE HYDROCHLORIDE 50 MG: 50 TABLET ORAL at 21:25

## 2019-10-28 RX ADMIN — TOPIRAMATE 25 MG: 25 TABLET, FILM COATED ORAL at 12:43

## 2019-10-28 NOTE — ED NOTES
Assumed care of patient at this time, brother at bedside, patient resting comfortable in bed     Amol Lugo RN  10/28/19 9671

## 2019-10-28 NOTE — CONSULTS
INTERPROFESSIONAL (PHONE) Sharmaine Hoffmann Toxicology  Flores Sanchez 52 y o  male MRN: [id number]  Unit/Bed#: ED 22 Encounter: [id number]      Reason for Consult / Principal Problem: overdose  Inpatient consult to Toxicology  Consult performed by: Kelly Olson DO  Consult ordered by: Aftab Cook DO        10/27/19      ASSESSMENT:  52year-old male with acute, intentional overdose on metformin, artorvastatin, lisinopril, and ibuprofen       RECOMMENDATIONS:  Patient has been monitored for 6 hours since time of ingestion with normal CMP on arrival, undetectable APAP at five hours and normal lactate (re metformin) at five hours  Provided asymptomatic with normal mental status, vital signs and ambulatory ability, I would not recommend any further toxicological evaluation/monitoring at this time  After chest pain evaluation, patient will need psychiatric evaluation  For further questions, please contact the medical  on call via [place] [facility] or throughl the [employer] or Patient [facility]  Please see additional teaching note below:    Hx and PE limited by the dynamics of a phone consultation  I have not personally interviewed or evaluated the patient, but only advised based on the information provided to me  Primary provider is responsible for all clinical decisions  Pertinent history, physical exam and clinical findings and course discussed: Flores Sanchez is a 52y o  year old male who presents with report of overdose on metformin, atorvastatin, lisinopril, and ibuprofen   He has had no GI symptoms to indicate NSAID toxicity, no hypotension to indicate lisinopril toxicity, and no lactate elevation or acidosis  to indicate metformin toxicity  ED physician evaluating further regarding chest pain  Review of systems and physical exam not performed by me      Historical Information   Past Medical History:   Diagnosis Date    Degenerative disc disease, lumbar 10/17/2019    Diabetes mellitus ([facility] [address] )     Hyperlipidemia     Hypertension     Psychosis ([facility] [address] )     Visual impairment      Past Surgical History:   Procedure Laterality Date    ELEVATION OF DEPRESSED SKULL FRACTURE       Social History   Social History     Substance and Sexual Activity   Alcohol Use No    Frequency: Never    Binge frequency: Never     Social History     Substance and Sexual Activity   Drug Use No     Social History     Tobacco Use   Smoking Status Never Smoker   Smokeless Tobacco Never Used     Family History   Problem Relation Age of Onset    Diabetes Father     Heart failure Mother     No Known Problems Sister     No Known Problems Brother         Prior to Admission medications    Medication Sig Start Date End Date Taking? Authorizing Provider   atorvastatin (LIPITOR) 20 mg tablet Take 1 tablet (20 mg total) by mouth daily 9/9/19   Amanda Oakley MD   Blood Glucose Monitoring Suppl (FREESTYLE FREEDOM LITE) w/Device KIT USED DEVICE TO CHECK BLOOD SUGAR ONCE DAILY  9/25/18   Alee Han PA-C   Blood Glucose Monitoring Suppl (FREESTYLE LITE) CHARAN Use as directed 12/26/18   Historical Provider, MD   gabapentin (NEURONTIN) 300 mg capsule Take 1 capsule (300 mg total) by mouth 4 (four) times a day 10/17/19   Amanda Oakley MD   glucose blood (FREESTYLE LITE) test strip Use trip to test blood sugar daily   3/5/19   Amanda Oakley MD   glucose monitoring kit (FREESTYLE) monitoring kit 1 each by Does not apply route 3 (three) times a day 3/5/19   Amanda Oakley MD   JANUVIA 100 MG tablet Take 1 tablet (100 mg total) by mouth every morning 9/9/19   Amanda Oakley MD   Lancets (FREESTYLE) lancets by Other route 3 (three) times a day Use as instructed 3/5/19   Amanda Oakley MD   lisinopril (ZESTRIL) 5 mg tablet Take 1 tablet (5 mg total) by mouth daily 10/17/19   Amanda Oakley MD   magnesium oxide (MAG-OX) 400 mg tablet Take 1 tablet by mouth daily 10/13/17   Historical Provider, MD   metFORMIN (GLUCOPHAGE) 1000 MG tablet Take 1 tablet (1,000 mg total) by mouth daily with breakfast 9/10/19   Estevan Fleming MD   methylPREDNISolone 4 MG tablet therapy pack Use as directed on package 10/17/19   Estevan Fleming MD   PHARMACIST CHOICE ALCOHOL 70 % PADS USE 3 TIMES A DAY TO 4 TIMES A DAY 12/25/18   Historical Provider, MD   topiramate (TOPAMAX) 25 mg tablet By oral route take 1 tablet twice daily or as directed 9/9/19   Estevan Fleming MD   Vitamin D, Ergocalciferol, 2000 units CAPS Take 1 tablet by mouth daily 9/11/19   Estevan Fleming MD       Current Facility-Administered Medications   Medication Dose Route Frequency    sodium chloride 0 9 % bolus 1,000 mL  1,000 mL Intravenous Once       No Known Allergies    Objective     No intake or output data in the 24 hours ending 10/27/19 2015    Invasive Devices:        Vitals   Vitals:    10/27/19 1648 10/27/19 1649 10/27/19 1858   BP: (!) 155/101  137/81   TempSrc:  Oral    Pulse: 95  92   Resp: 18  16   Patient Position - Orthostatic VS: Lying  Lying   Temp:  98 4 °F (36 9 °C)              Lab Results: I have personally reviewed pertinent reports  Labs:    Results from last 7 days   Lab Units 10/27/19  1731   WBC Thousand/uL 5 86   HEMOGLOBIN g/dL 15 2   HEMATOCRIT % 48 7   PLATELETS Thousands/uL 305   NEUTROS PCT % 58   LYMPHS PCT % 32   MONOS PCT % 9      Results from last 7 days   Lab Units 10/27/19  1731   SODIUM mmol/L 137   POTASSIUM mmol/L 3 8   CHLORIDE mmol/L 100   CO2 mmol/L 28   BUN mg/dL 15   CREATININE mg/dL 1 26   CALCIUM mg/dL 9 6   ALK PHOS U/L 106   ALT U/L 63   AST U/L 35          Results from last 7 days   Lab Units 10/27/19  1852   LACTIC ACID mmol/L 1 4     0   Lab Value Date/Time    TROPONINI <0 02 10/27/2019 1731         Results from last 7 days   Lab Units 10/27/19  1731   ACETAMINOPHEN LVL ug/mL <2*   ETHANOL LVL mg/dL <3       Counseling / Coordination of Care  Total time spent today 38 minutes  This was a phone consultation

## 2019-10-28 NOTE — ED NOTES
Assumed care of patient at this time  Patient resting comfortably in bed denying any discomfort  He reports, "seeing people running in the air around me and voices telling me to kill myself"  Sid-ed tech at bedside for 1:1 completing behavioral health safety checks on paper chart       Hazel St RN  10/27/19 9368

## 2019-10-28 NOTE — ED NOTES
Patient is accepted at [facility]  Patient is accepted by Dain Vu per [address] [facility] [address] is arranged with Tori Brand [address] is scheduled for [phone]    Nurse report is to be called to [phone]    prior to patient transfer

## 2019-10-28 NOTE — EMTALA/ACUTE CARE TRANSFER
[facility] [address]  [address] [id number]  Dept: [phone]      EMTALA TRANSFER CONSENT    NAME Shruthi WALKER 1970                              MRN [id number]    I have been informed of my rights regarding examination, treatment, and transfer   by Dr Misael Alex MD    Benefits: Continuity of care    Risks: Potential for delay in receiving treatment      Consent for Transfer:  I acknowledge that my medical condition has been evaluated and explained to me by the emergency department physician or other qualified medical person and/or my attending physician, who has recommended that I be transferred to the service of  Accepting Physician: Sb Hassan at [address] Name, [address] : [address] [place]  The above potential benefits of such transfer, the potential risks associated with such transfer, and the probable risks of not being transferred have been explained to me, and I fully understand them  The doctor has explained that, in my case, the benefits of transfer outweigh the risks  I agree to be transferred  I authorize the performance of emergency medical procedures and treatments upon me in both transit and upon arrival at the receiving facility  Additionally, I authorize the release of any and all medical records to the receiving facility and request they be transported with me, if possible  I understand that the safest mode of transportation during a medical emergency is an ambulance and that the Hospital advocates the use of this mode of transport  Risks of traveling to the receiving facility by car, including absence of medical control, life sustaining equipment, such as oxygen, and medical personnel has been explained to me and I fully understand them  (UBALDO CORRECT BOX BELOW)  [  ]  I consent to the stated transfer and to be transported by ambulance/helicopter    [  ]  I consent to the stated transfer, but refuse transportation by ambulance and accept full responsibility for my transportation by car  I understand the risks of non-ambulance transfers and I exonerate the Hospital and its staff from any deterioration in my condition that results from this refusal     X___________________________________________    DATE  10/28/19  TIME________  Signature of patient or legally responsible individual signing on patient behalf           RELATIONSHIP TO PATIENT_________________________          Provider Certification    NAME Zeinab Valentin                                         1970                              MRN [id number]    A medical screening exam was performed on the above named patient  Based on the examination:    Condition Necessitating Transfer The primary encounter diagnosis was Suicide attempt [facility])  A diagnosis of Polysubstance overdose, intentional self-harm, initial encounter [facility]) was also pertinent to this visit  Patient Condition: The patient has been stabilized such that within reasonable medical probability, no material deterioration of the patient condition or the condition of the unborn child(amadou) is likely to result from the transfer    Reason for Transfer: Level of Care needed not available at this facility    Transfer Requirements: [facility] 65 22 2 [facility]   · Space available and qualified personnel available for treatment as acknowledged by Beatriz Mcneill  · Agreed to accept transfer and to provide appropriate medical treatment as acknowledged by       Dain Vu  · Appropriate medical records of the examination and treatment of the patient are provided at the time of transfer   [address], [address] _______  · Transfer will be performed by qualified personnel from [facility]  and appropriate transfer equipment as required, including the use of necessary and appropriate life support measures      Provider Certification: I have examined the patient and explained the following risks and benefits of being transferred/refusing transfer to the patient/family:  General risk, such as traffic hazards, adverse weather conditions, rough terrain or turbulence, possible failure of equipment (including vehicle or aircraft), or consequences of actions of persons outside the control of the transport personnel      Based on these reasonable risks and benefits to the patient and/or the unborn child(amadou), and based upon the information available at the time of the patients examination, I certify that the medical benefits reasonably to be expected from the provision of appropriate medical treatments at another medical facility outweigh the increasing risks, if any, to the individuals medical condition, and in the case of labor to the unborn child, from effecting the transfer      X____________________________________________ DATE 10/28/19        TIME_______      ORIGINAL - SEND TO MEDICAL RECORDS   COPY - SEND WITH PATIENT DURING TRANSFER

## 2019-10-28 NOTE — ED NOTES
Pt  Family at bedside at this time   Pt  Caleb Aguila on a 1:1 with staff member at this time       Isela Garnica RN  10/27/19 1330

## 2019-10-28 NOTE — ED NOTES
Spoke with caregiver and patient regarding 201 status  Patient has been requesting to go home and does not want to stay  He reports he didnt know what he was signing and reports he thought he was signing medicare documents  His caregiver states patient does not read or write and didn't understand what he was signing however it was made clear to him and caregiver that he did attempt to overdose and he would need to stay and if he wouldn't consent he would be involuntarily committed  Patient agrees he took an overdose and agrees he needs treatment and will stay with his 12

## 2019-10-28 NOTE — ED NOTES
Family at bedside    Charlyrafa Vaughn sister     Verna Lock, RN  10/28/19 8688 [address], RN  10/28/19 4218

## 2019-10-28 NOTE — ED NOTES
Patient given portable phone to speak with [facility]-crisis worker     Anthony Rasmussen RN  10/27/19 3774

## 2019-10-28 NOTE — ED NOTES
Per Dr Yashira Levy, hold medications at this time because are the ones perico overdose on      Mio Moy, [facility]  10/28/19 1205

## 2019-10-28 NOTE — ED NOTES
Faxed 201 to ED for patient to sign  He stated he wants to remain local  There are no local beds  Will fax 201 to  Intake Unit for am review

## 2019-10-28 NOTE — ED NOTES
Pt asking if he can leave because he "needs to make money " Pt made aware he signed a 201 and therefore it is a 3 day voluntary commitment  Pt made aware that d/t his actions of attempting to commit suicide by overdosing, he will not be permitted to leave  Pt made aware that his medical stay here yesterday does not count towards his three days because he was not medically cleared at that time   Pt reports that he did not realize what he was signing at that time, states, "I thought it was for medicare "      Radha Lund, RN  10/28/19 103 Fanny May RN  10/28/19 1111

## 2019-10-28 NOTE — ED NOTES
Confirmed medications with pt's significant other  States he is compliant because she is the one who administers his medications        Alvina Curiel RN  10/28/19 9197

## 2019-10-28 NOTE — ED NOTES
Insurance Authorization:  [employer]  Phone call placed to [facility]  Phone number: [phone]     Spoke to: Ata Nguyễn  Days approved- Will match medicare days  Level of care: Inpatient treatment  Review on   Authorization # Facility to call on arrival

## 2019-10-28 NOTE — ED NOTES
Spoke to [employer] on phone  Will have another crisis worker who is at a facility call us in 5-10 minutes for an assessment with the patient over the phone  Dr Saniya Bentley made aware       Cory Gonzalez, RN  10/27/19 7433

## 2019-10-28 NOTE — ED NOTES
Pt sitting up in bed taking quietly with visitor (sister) at bedside        Janeen Echavarria RN  10/28/19 2272

## 2019-10-29 PROBLEM — F39 MOOD DISORDER (HCC): Status: ACTIVE | Noted: 2019-10-29

## 2019-10-29 LAB
BILIRUB UR QL STRIP: NEGATIVE
CLARITY UR: CLEAR
COLOR UR: YELLOW
GLUCOSE SERPL-MCNC: 116 MG/DL (ref 65–140)
GLUCOSE SERPL-MCNC: 156 MG/DL (ref 65–140)
GLUCOSE SERPL-MCNC: 162 MG/DL (ref 65–140)
GLUCOSE UR STRIP-MCNC: NEGATIVE MG/DL
HGB UR QL STRIP.AUTO: NEGATIVE
KETONES UR STRIP-MCNC: NEGATIVE MG/DL
LEUKOCYTE ESTERASE UR QL STRIP: NEGATIVE
NITRITE UR QL STRIP: NEGATIVE
PH UR STRIP.AUTO: 5.5 [PH]
PROT UR STRIP-MCNC: NEGATIVE MG/DL
RPR SER QL: NORMAL
SP GR UR STRIP.AUTO: 1.02 (ref 1–1.03)
TSH SERPL DL<=0.05 MIU/L-ACNC: 3.08 UIU/ML (ref 0.36–3.74)
UROBILINOGEN UR QL STRIP.AUTO: 0.2 E.U./DL

## 2019-10-29 PROCEDURE — 84443 ASSAY THYROID STIM HORMONE: CPT | Performed by: PHYSICIAN ASSISTANT

## 2019-10-29 PROCEDURE — 86592 SYPHILIS TEST NON-TREP QUAL: CPT | Performed by: PHYSICIAN ASSISTANT

## 2019-10-29 PROCEDURE — 82948 REAGENT STRIP/BLOOD GLUCOSE: CPT

## 2019-10-29 PROCEDURE — 99223 1ST HOSP IP/OBS HIGH 75: CPT | Performed by: PHYSICIAN ASSISTANT

## 2019-10-29 PROCEDURE — 81003 URINALYSIS AUTO W/O SCOPE: CPT | Performed by: PHYSICIAN ASSISTANT

## 2019-10-29 PROCEDURE — 99221 1ST HOSP IP/OBS SF/LOW 40: CPT | Performed by: PSYCHIATRY & NEUROLOGY

## 2019-10-29 RX ORDER — ARIPIPRAZOLE 5 MG/1
5 TABLET ORAL DAILY
Status: DISCONTINUED | OUTPATIENT
Start: 2019-10-29 | End: 2019-10-30

## 2019-10-29 RX ADMIN — ARIPIPRAZOLE 5 MG: 5 TABLET ORAL at 12:17

## 2019-10-29 RX ADMIN — TRAZODONE HYDROCHLORIDE 50 MG: 50 TABLET ORAL at 22:48

## 2019-10-29 RX ADMIN — ATORVASTATIN CALCIUM 20 MG: 20 TABLET, FILM COATED ORAL at 16:49

## 2019-10-29 RX ADMIN — METFORMIN HYDROCHLORIDE 1000 MG: 500 TABLET, FILM COATED ORAL at 10:30

## 2019-10-29 RX ADMIN — LISINOPRIL 5 MG: 5 TABLET ORAL at 10:30

## 2019-10-29 NOTE — CONSULTS
Consult Note   Assessment:  Worsening depression with a suicide attempt  Diabetes type 2-hemoglobin A1c 8, September 2019  Previous suicide attempts  Hyperlipidemia  Back pain    Plan:  Admitted in the hospital for psych evaluation and treatment  Continue with regular blood sugar check, metformin as prescribed and carb restricted diet  Patient can follow up with his physician is an outpatient for his medical issues  Patient mentions that he was seen by his primary care provider 15 days ago   ______________________________________________________________________    Consulting Service: Psychiatry    Chief Complaint:  Depression with suicide attempt    HPI: Sandra Will, a 52 y o  male is admitted in the hospital after he took lisinopril, Lipitor and ibuprofen to over the 2 days ago  This is his 4th suicide attempt by OD per patient  Patient mentions having a lot of stress, issue with her girlfriend and just wanted to kill himself  Patient mentions that he took 4 tablets of ibuprofen, metformin, lisinopril and Lipitor, he is unsure exactly how many tablets he took  Patient was seen in the emergency room few hours after his over the, also seen by a medical toxicology as well  Patient denies any homicide ideation, or any hallucinations  Had CT of head on arrival, no acute intracranial abnormality  UDS negative  Patient denies any chest pain, difficulty breathing, fever, chills, any headaches or abdominal pain  He says he is just feeling down    Review of Systems:  General: negative  Cardiovascular: no chest pain or dyspnea on exertion  Respiratory: no cough, shortness of breath, or wheezing  Gastrointestinal: no abdominal pain, change in bowel habits, or black or bloody stools  Genitourinary ROS: no dysuria, trouble voiding, or hematuria  Musculoskeletal ROS: negative  Neurological ROS: no TIA or stroke symptoms  Hematological and Lymphatic ROS: negative  Dermatological ROS: negative  Psychological ROS: positive for - depression, sleep disturbances and suicidal ideation  Ophthalmic ROS: negative  ENT ROS: negative    Past Medical History:  Past Medical History:   Diagnosis Date    Degenerative disc disease, lumbar 10/17/2019    Diabetes mellitus [facility] )     Hyperlipidemia     Hypertension     Literacy level of illiterate     Psychosis ([facility] )     Visual impairment        Past Surgical History:  Past Surgical History:   Procedure Laterality Date    ELEVATION OF DEPRESSED SKULL FRACTURE         Social History:  Social History     Substance and Sexual Activity   Alcohol Use No    Frequency: Never    Binge frequency: Never     Social History     Substance and Sexual Activity   Drug Use No     Social History     Tobacco Use   Smoking Status Never Smoker   Smokeless Tobacco Never Used       Family History:  Family History   Problem Relation Age of Onset    Diabetes Father     Heart failure Mother     No Known Problems Sister     No Known Problems Brother        Allergies:  No Known Allergies    Medications:  Current Facility-Administered Medications   Medication Dose Route Frequency    acetaminophen (TYLENOL) tablet 325 mg  325 mg Oral Q6H PRN    acetaminophen (TYLENOL) tablet 650 mg  650 mg Oral Q4H PRN    acetaminophen (TYLENOL) tablet 975 mg  975 mg Oral Q6H PRN    aluminum-magnesium hydroxide-simethicone (MYLANTA) 200-200-20 mg/5 mL oral suspension 30 mL  30 mL Oral Q4H PRN    atorvastatin (LIPITOR) tablet 20 mg  20 mg Oral Daily With Dinner    benztropine (COGENTIN) injection 1 mg  1 mg Intramuscular Q6H PRN    benztropine (COGENTIN) tablet 1 mg  1 mg Oral Q6H PRN    haloperidol (HALDOL) tablet 5 mg  5 mg Oral Q6H PRN    haloperidol lactate (HALDOL) injection 5 mg  5 mg Intramuscular Q6H PRN    hydrOXYzine HCL (ATARAX) tablet 25 mg  25 mg Oral Q6H PRN    hydrOXYzine HCL (ATARAX) tablet 50 mg  50 mg Oral Q6H PRN    lisinopril (ZESTRIL) tablet 5 mg  5 mg Oral Daily    LORazepam (ATIVAN) 2 mg/mL injection 2 mg  2 mg Intramuscular Q6H PRN    LORazepam (ATIVAN) tablet 1 mg  1 mg Oral Q6H PRN    magnesium hydroxide (MILK OF MAGNESIA) 400 mg/5 mL oral suspension 30 mL  30 mL Oral Daily PRN    metFORMIN (GLUCOPHAGE) tablet 1,000 mg  1,000 mg Oral Daily With Breakfast    OLANZapine (ZyPREXA) IM injection 10 mg  10 mg Intramuscular Q8H PRN    OLANZapine (ZyPREXA) tablet 10 mg  10 mg Oral Q8H PRN    risperiDONE (RisperDAL M-TABS) dispersible tablet 1 mg  1 mg Oral Q3H PRN    traZODone (DESYREL) tablet 50 mg  50 mg Oral HS PRN       Vitals:  BP      Temp     Pulse     Resp      SpO2        I/Os:  No intake/output data recorded  No intake/output data recorded      Lab Results and Cultures:   CBC with diff:   Lab Results   Component Value Date    WBC 5 86 10/27/2019    HGB 15 2 10/27/2019    HCT 48 7 10/27/2019    MCV 85 10/27/2019     10/27/2019    MCH 26 6 (L) 10/27/2019    MCHC 31 2 (L) 10/27/2019    RDW 13 4 10/27/2019    MPV 9 4 10/27/2019    NRBC 0 10/27/2019   ,   BMP/CMP:  Lab Results   Component Value Date    K 3 8 10/27/2019     10/27/2019    CO2 28 10/27/2019    CO2 27 01/27/2018    BUN 15 10/27/2019    CREATININE 1 26 10/27/2019    GLUCOSE 144 (H) 01/27/2018    CALCIUM 9 6 10/27/2019    AST 35 10/27/2019    ALT 63 10/27/2019    ALKPHOS 106 10/27/2019    EGFR 67 10/27/2019    EGFR 72 01/27/2018   ,   Lipid Panel: No results found for: CHOL,   Coags: No results found for: PT, PTT, INR,     Blood Culture: No results found for: BLOODCX,   Urinalysis:   Lab Results   Component Value Date    COLORU Yellow 01/27/2018    CLARITYU Clear 01/27/2018    SPECGRAV 1 020 01/27/2018    PHUR 5 5 01/27/2018    LEUKOCYTESUR Moderate (A) 01/27/2018    NITRITE Negative 01/27/2018    GLUCOSEU Negative 01/27/2018    KETONESU Negative 01/27/2018    BILIRUBINUR Negative 01/27/2018    BLOODU Moderate (A) 01/27/2018   ,   Urine Culture:   Lab Results   Component Value Date    URINECX >100,000 cfu/ml  01/27/2018 Physical Exam:    General appearance: alert and oriented, in no acute distress  Head: Normocephalic, without obvious abnormality, atraumatic  Eyes: conjunctivae/corneas clear  PERRL, EOM's intact  Fundi benign  Throat: lips, mucosa, and tongue normal; teeth and gums normal  Neck: no adenopathy, no carotid bruit, no JVD, supple, symmetrical, trachea midline and thyroid not enlarged, symmetric, no tenderness/mass/nodules  Back: symmetric, no curvature  ROM normal  No CVA tenderness    Lungs: clear to auscultation bilaterally  Chest wall: no tenderness  Heart: regular rate and rhythm, S1, S2 normal, no murmur, click, rub or gallop  Abdomen: soft, non-tender; bowel sounds normal; no masses,  no organomegaly  Genitalia: deferred  Rectal: deferred  Extremities: extremities normal, warm and well-perfused; no cyanosis, clubbing, or edema  Skin: Skin color, texture, turgor normal  No rashes or lesions  Neurologic: Grossly normal

## 2019-10-29 NOTE — PROGRESS NOTES
AM rounds per report from previous shift:  Discussed reason for admit, head CT, CXR and cardiac work up completed in ED, CAH, VH only at bedtime, HX: DM, HTN, TBI, illiterate

## 2019-10-29 NOTE — PROGRESS NOTES
Pt requested and received PRN Trazodone 50mg po at 2125 for sleep  Pt observed sleeping at this time

## 2019-10-29 NOTE — PROGRESS NOTES
Pt irritable, scant conversation  Denies SI/AH/VH at present  Visible in the milieu  Attended some groups today  Medication compliant

## 2019-10-29 NOTE — TREATMENT PLAN
TREATMENT PLAN REVIEW - Maida [age] y o  1970 male MRN: [id number]    [address] [place] [address] Room / Bed: Sofía Joseph [facility] [id number] Encounter: [id number]        Admit Date/Time:  10/28/2019  7:51 PM    Treatment Team: Attending Provider: Amira Robledo; Consulting Physician: Vamsi Booth MD; Patient Care Assistant: Gonzalez Nava; Patient Care Technician: Addis Diaz; Registered Nurse: Sari Rodriguez RN; Care Manager: Selam Kruger, REMBERTO; Patient Care Technician: Sudha Olson; : Rawleigh Board; Medications RN: Mehnaz Sanchez RN; Registered Nurse: Venecia Riggins RN; Occupational Therapy Assistant: DOM Noble;  Resident: Amena Serrato DO    Diagnosis: Principal Problem:    Mood disorder ([facility] )  R/o major depression disorder with psychotic features vs bipolar disorder vs schizoaffective disorder    Patient Strengths/Assets: cooperative, motivation for treatment/growth, patient is on a voluntary commitment      Patient Barriers/Limitations: low self esteem    Short Term Goals: decrease in depressive symptoms, decrease in anxiety symptoms, decrease in psychotic symptoms, decrease in suicidal thoughts    Long Term Goals: improvement in depression, improvement in anxiety, stabilization of mood, free of suicidal thoughts, resolution of psychotic symptoms, acceptance of need for psychiatric medications, acceptance of need for psychiatric treatment, acceptance of need for psychiatric follow up after discharge    Progress Towards Goals: starting psychiatric medications as prescribed    Recommended Treatment: medication management, patient medication education, group therapy, milieu therapy, continued Behavioral Health psychiatric evaluation/assessment process     Treatment Frequency: daily medication monitoring, group and milieu therapy daily, monitoring through interdisciplinary rounds, monitoring through weekly patient care conferences    Expected Discharge Date: 7 days - 11/5/2019    Discharge Plan: referral for outpatient medication management with a psychiatrist, referral for outpatient psychotherapy    Treatment Plan Created/Updated By: Jun Jean Baptiste

## 2019-10-29 NOTE — PROGRESS NOTES
Pt less irritable this shift, scant conversation  More visible in the milieu  Attended some groups  Denies SI/AVH  Medication compliant

## 2019-10-29 NOTE — DISCHARGE INSTR - OTHER ORDERS
[facility]  [address], [place]ileep Jurado [address]  Tel: [phone]  Fax:([phone])    [facility] [place] Crisis    Crisis Intervention: [address] is a confidential 7 days/week telephone support service manned by trained mental health consumers   [employer] operates daily but is not able to [address]   · [facility] provides support, a listening ear and can provide information about available services       · [facility] specializes in the concerns of mental health consumers, their families and friends   However, we are also here for anyone who has a mental health concern, is confused about or just doesn't know anything about mental health or where to get information       To reach Rock Babinski, call [phone] accepts calls between 6:00 AM to 10:00 AM and from 4:00 PM to 16:36 AM or click on the link to view additional information  The [facility] Family-to-Family Education Program is a free 12-week (2 1/2 hours/week) course for families of individuals with severe brain disorders (mental illnesses)  The classes are taught by trained family members  All course materials are furnished at no cost to you  Below are some details  To register, e-mail [email or web address] or call [phone]  The curriculum focuses on schizophrenia, bipolar disorder (manic depression), clinical depression, panic disorders and obsessive-compulsive disorder (OCD)  The course discusses the clinical treatment of these illnesses and teaches the knowledge and skills that family members need to cope more effectively    Topics Include:  Learning about feelings, learning about facts   Schizophrenia, major depression and kevin: diagnosis and dealing with critical periods   Subtypes of depression and bipolar disorder, panic disorder and OCD; diagnosis and causes; sharing our stories   The biology of the brain/new research   Problem solving workshops   Medication review   Empathy workshop  what its like to have a brain disorder   Communication skills workshop   Self-care and relative groups   Rehabilitation, services available   Advocacy: fighting stigma   Review and certification ceremony    Mapz-rq-Cgbz Education Course  The [employer] Education class is a ten week  two hours per week  experiential education course on the topic of recovery for any person with serious mental illness who is interested in establishing and maintaining wellness  The course uses a combination of lecture, interactive exercises, and structural group processes  The diversity of experience among participants affords for a lively dynamic that moves the course along  KELLYs Vmld-gn-Joso Education class is offered free of charge to people who experience mental illness  You do not need to be a member of [facility] to take the course  Courses are taught by teams of trained mentors/peer-teachers who are themselves experienced at living well with mental illness  Below are some details  To register, call [phone] or e-mail [email or web address]  Sign up today! [facility] group is for family members, caregivers, and loved ones of individuals living with the everyday challenges of mental illness  The leaders are family members in the same situation  Sessions take place in an intimate, confidential setting to allow families to share openly with each other  These support groups allow participants to learn from the experiences of other group members, share coping strategies, and offer each other encouragement and understanding  Anmol Signs know that you are not alone  Drop inno registration is necessary  Here are the times and locations    [place]  Monthly: 3rd Monday, 7:00-8:30 pm  [facility], [place]  Monthly: 4th Tuesday, 7:00-8:30 pm  [facility]  Monthly: 1st Monday, 7:00-8:30 pm  [facility]  [address] [facility], [address] [place] [address]         Monthly Support for Persons with Mental Illness  The Peer Support Group is a monthly meeting for individuals facing the challenges of recovering from severe and persistent mental illness  Depression, manic depression, schizophrenia, and general anxiety disorder are only a few of the diagnoses of individuals who have found a supportive place at our meetings  Our [facility]  We are a fellowship of individuals who share a common goal of recovery and the ability to maintain mental and emotional stability  We help others and ourselves through sharing our experiences, strength and hope with each other  No matter how traumatic our past or how despairing our present may be, there is hope for a new day  Sessions take place in an intimate, confidential setting to allow individuals to share openly with each other  Radha [place] know that you are not alone  Drop inno registration is necessary  Here are the times and locations    [place]  Monthly: 1st Monday, 7:00-8:30 pm  [facility]  [address] [place], [place]   JDUFRCEVD  Monthly: 3rd Monday, 7:00-8:30 pm  [address]  [address] [facility]
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
## 2019-10-29 NOTE — TREATMENT PLAN
Nurse to meet with patient twice daily to discuss diagnosis,  medications, symptoms, review progress and provide support as needed

## 2019-10-29 NOTE — PROGRESS NOTES
Belongings Pt  Admitted with:     At bedside:  [address]:  dinh  Loose change  Cell phone  Gum  Sneakers w/laces  sweatpants w/strings

## 2019-10-29 NOTE — PLAN OF CARE
Problem: SELF HARM/SUICIDALITY  Goal: Will have no self-injury during hospital stay  Description  INTERVENTIONS:  - Q 15 MINUTES: Routine safety checks  - Q WAKING SHIFT & PRN: Assess risk to determine if routine checks are adequate to maintain patient safety  - Encourage patient to participate actively in care by formulating a plan to combat response to suicidal ideation, identify supports and resources  Outcome: Progressing     Problem: DEPRESSION  Goal: Will be euthymic at discharge  Description  INTERVENTIONS:  - Administer medication as ordered  - Provide emotional support via 1:1 interaction with staff  - Encourage involvement in milieu/groups/activities  - Monitor for social isolation  Outcome: Progressing     Problem: ANXIETY  Goal: Will report anxiety at manageable levels  Description  INTERVENTIONS:  - Administer medication as ordered  - Teach and encourage coping skills  - Provide emotional support  - Assess patient/family for anxiety and ability to cope  Outcome: Progressing

## 2019-10-29 NOTE — PROGRESS NOTES
Pt admitted on 201 from [facility] ED after intentional overdose on home medications  Per ED staff, pt took unknown amount of Lipitor, Lisinopril and Ibuprofen  Pt unsure which meds and how much was taken when asked by writer  Pt states he argued with girlfriend and she left their home  Pt states "I saw the pills sitting there and I took them"  Per pt, girlfriend returned to find pt on floor and called 911  Pt state he understands this was a mistake and will never do it again  Denies SI currently  Pt is unable to provide complete list of home meds to writer  States Sajiramón Atul is his payee whom he also refers to as his girlfriend  Pt reports having VH and CAH to hurt himself at bedtime only and needs sleeping med  PMH: diabetes, HTN

## 2019-10-30 LAB
GLUCOSE SERPL-MCNC: 164 MG/DL (ref 65–140)
GLUCOSE SERPL-MCNC: 174 MG/DL (ref 65–140)

## 2019-10-30 PROCEDURE — 82948 REAGENT STRIP/BLOOD GLUCOSE: CPT

## 2019-10-30 RX ORDER — ARIPIPRAZOLE 5 MG/1
5 TABLET ORAL ONCE
Status: COMPLETED | OUTPATIENT
Start: 2019-10-30 | End: 2019-10-30

## 2019-10-30 RX ORDER — ARIPIPRAZOLE 10 MG/1
10 TABLET ORAL DAILY
Status: DISCONTINUED | OUTPATIENT
Start: 2019-10-31 | End: 2019-11-04 | Stop reason: HOSPADM

## 2019-10-30 RX ADMIN — TRAZODONE HYDROCHLORIDE 50 MG: 50 TABLET ORAL at 21:18

## 2019-10-30 RX ADMIN — ATORVASTATIN CALCIUM 20 MG: 20 TABLET, FILM COATED ORAL at 17:26

## 2019-10-30 RX ADMIN — ARIPIPRAZOLE 5 MG: 5 TABLET ORAL at 12:41

## 2019-10-30 RX ADMIN — LISINOPRIL 5 MG: 5 TABLET ORAL at 09:27

## 2019-10-30 RX ADMIN — ACETAMINOPHEN 650 MG: 325 TABLET, FILM COATED ORAL at 17:30

## 2019-10-30 RX ADMIN — ARIPIPRAZOLE 5 MG: 5 TABLET ORAL at 09:27

## 2019-10-30 RX ADMIN — METFORMIN HYDROCHLORIDE 1000 MG: 500 TABLET, FILM COATED ORAL at 09:27

## 2019-10-30 NOTE — PROGRESS NOTES
Patient pleasant cooperative during interaction  Denies SI/HI/AVH  Reports sleeping well last night and is hopeful for d/c  Compliant with medications, attends groups appropriately  No present concerns

## 2019-10-30 NOTE — PROGRESS NOTES
10/30/19 0729   Team Meeting   Meeting Type Daily Rounds   Team Members Present   Team Members Present Physician;Nurse;;Occupational Therapist   Physician Team Member Dr Eason Speaker Team Member [facility] Management Team Member Bennett/ Caitlyn Grajeda   OT Team Member [place]   Patient/Family Present   Patient Present No   Patient's Family Present No     Pt started on Abilify  Possible titration if needed  Took Trazodone last night and slept well  No dc date yet

## 2019-10-30 NOTE — PROGRESS NOTES
10/30/19 0731   Team Meeting   Meeting Type Tx Team Meeting   Team Members Present   Team Members Present Physician;Nurse;   Physician Team Member Dr Susan Blanchard Team Member SARTH[facility] Management Team Member Bennett   Patient/Family Present   Patient Present Yes   Patient's Family Present No     Diagnosis of mood disorder  [address] [address] team discussed aim to reduced voices and improved depression  Pt is going to placed on Abilify  No dc date  Pt's progress will be monitored  Pt identifies good family supports and having income as his strengths

## 2019-10-30 NOTE — PROGRESS NOTES
Pleasant during interaction  Currently sitting in dayroom watching television  Denies SI/HI  Improved mood  Hopeful for discharge and plans to move to [place] to stay with sister  Reviewed treatment plan

## 2019-10-30 NOTE — PROGRESS NOTES
Progress Note - Behavioral Health   James Curiel 52 y o  male MRN: [id number]  Unit/Bed#: U 260-02 Encounter: [id number]    Assessment/Plan   Principal Problem:    Mood disorder (HCC)      Plan  · Aripiprazole 5 mg given today, 5 mg extra ordered  · Increase Aripiprazole 10 mg daily tomorrow for mood stabilization and psychotic symptom management     Subjective:  Patient reports feeling "good" today  Reports having spoken to his sister yesterday  Stated that he realizes now that his family cares about him and support him  Reports that his sister will bring him to [place] after discharge and together they will go to [facility] to visit their father, who he states he has not seen in many years  States he is looking forward to spending time with his family  Reports some improvement in mood today but still is depressed  States he slept well last night  Denies auditory or visual hallucinations since two days ago  Pt has passive death wishes and is internally preoccupied  Reports improvement in appetite  Patient is girma for safety on the unit        Psychiatric Review of Systems:  Behavior over the last 24 hours:  Improved  Sleep: Improved  Appetite: Improved  Medication side effects: Denies  ROS: no complaints    Current Medications:    Current Facility-Administered Medications:  acetaminophen 325 mg Oral Q6H PRN Ruthanna Linker, PA-C   acetaminophen 650 mg Oral Q4H PRN Ruthanna Linker, PA-C   acetaminophen 975 mg Oral Q6H PRN Ruthanna Linker, PA-C   aluminum-magnesium hydroxide-simethicone 30 mL Oral Q4H PRN Ruthanna Linker, PA-C   ARIPiprazole 5 mg Oral Daily Yared Cody   atorvastatin 20 mg Oral Daily With Ann Soup, PA-C   benztropine 1 mg Intramuscular Q6H PRN Ruthanna Linker, PA-C   benztropine 1 mg Oral Q6H PRN Ruthanna Linker, PA-C   haloperidol 5 mg Oral Q6H PRN Ruthanna Linker, PA-C   haloperidol lactate 5 mg Intramuscular Q6H PRN Ruthanna Linker, PA-C   hydrOXYzine HCL 25 mg Oral Q6H PRN Gayl Graft, PA-C   hydrOXYzine HCL 50 mg Oral Q6H PRN Gayl Graft, PA-C   lisinopril 5 mg Oral Daily Gayl Graft, PA-C   LORazepam 2 mg Intramuscular Q6H PRN Gayl Graft, PA-C   LORazepam 1 mg Oral Q6H PRN Gayl Graft, PA-C   magnesium hydroxide 30 mL Oral Daily PRN Gayl Graft, PA-C   metFORMIN 1,000 mg Oral Daily With Breakfast Gayl Graft, PA-C   OLANZapine 10 mg Intramuscular Q8H PRN Gayl Graft, PA-C   OLANZapine 10 mg Oral Q8H PRN Gayl Graft, PA-C   risperiDONE 1 mg Oral Q3H PRN Gayl Graft, PA-C   traZODone 50 mg Oral HS PRN Gayl Graft, PA-C         Vital signs in last 24 hours:  Temp:  [96 2 °F (35 7 °C)-96 3 °F (35 7 °C)] 96 3 °F (35 7 °C)  HR:  [62-80] 62  Resp:  [16-17] 16  BP: (112-115)/(68) 112/68    Laboratory results:    I have personally reviewed all pertinent laboratory/tests results  Most Recent Labs:   Lab Results   Component Value Date    WBC 5 86 10/27/2019    RBC 5 72 (H) 10/27/2019    HGB 15 2 10/27/2019    HCT 48 7 10/27/2019     10/27/2019    RDW 13 4 10/27/2019    NEUTROABS 3 43 10/27/2019    SODIUM 137 10/27/2019    K 3 8 10/27/2019     10/27/2019    CO2 28 10/27/2019    BUN 15 10/27/2019    CREATININE 1 26 10/27/2019    GLUCOSE 144 (H) 01/27/2018    GLUC 142 (H) 10/27/2019    GLUF 185 (H) 05/30/2019    CALCIUM 9 6 10/27/2019    AST 35 10/27/2019    ALT 63 10/27/2019    ALKPHOS 106 10/27/2019    TP 8 7 (H) 10/27/2019    ALB 4 2 10/27/2019    TBILI 0 79 10/27/2019    CHOLESTEROL 180 05/30/2019    HDL 37 (L) 05/30/2019    TRIG 95 05/30/2019    LDLCALC 124 05/30/2019    NONHDLC 141 01/29/2019    [id number] 3 075 10/29/2019    FREET4 1 02 05/30/2019    T3FREE 3 07 10/17/2017    RPR Non-Reactive 10/29/2019    HGBA1C 8 0 (A) 09/09/2019     05/30/2019           Mental Status Evaluation:  Appearance:  appears stated age, casually dressed and overweight    Smiling more often   Behavior: calm, cooperative and psychomotor retardation   Speech:  normal rate, normal pitch and normal volume   Mood:  "good"   Affect:  Dysphoric, not tearful today   Thought Process:  goal directed and logical   Thought Content:  no delusions elicited   Perceptual Disturbances: Denies auditory or visual hallucinations   Risk Potential: Passive death wishes   Sensorium:  person, place and situation   Cognition:  grossly intact   Consciousness:  alert and awake   Attention: attention span and concentration were age appropriate   Insight:  poor   Judgment: poor   Intellect appears to be of average intelligence   Gait/Station: normal gait/station   Motor Activity: no abnormal movements       Progress Toward Goals: Patient is progressing toward goals    Recommended Treatment:   Continue with group therapy, milieu therapy and occupational therapy  Continue medication management  Risks, benefits and possible side effects of Medications:   Risks, benefits, and possible side effects of medications explained to patient and patient verbalizes understanding  This note has been constructed using a voice recognition system  There may be translation, syntax,  or grammatical errors  If you have any questions, please contact the dictating provider

## 2019-10-31 LAB
GLUCOSE SERPL-MCNC: 129 MG/DL (ref 65–140)
GLUCOSE SERPL-MCNC: 156 MG/DL (ref 65–140)

## 2019-10-31 PROCEDURE — 82948 REAGENT STRIP/BLOOD GLUCOSE: CPT

## 2019-10-31 RX ADMIN — TRAZODONE HYDROCHLORIDE 50 MG: 50 TABLET ORAL at 21:41

## 2019-10-31 RX ADMIN — LISINOPRIL 5 MG: 5 TABLET ORAL at 09:45

## 2019-10-31 RX ADMIN — METFORMIN HYDROCHLORIDE 1000 MG: 500 TABLET, FILM COATED ORAL at 09:44

## 2019-10-31 RX ADMIN — ACETAMINOPHEN 650 MG: 325 TABLET, FILM COATED ORAL at 10:44

## 2019-10-31 RX ADMIN — ATORVASTATIN CALCIUM 20 MG: 20 TABLET, FILM COATED ORAL at 17:13

## 2019-10-31 RX ADMIN — ARIPIPRAZOLE 10 MG: 10 TABLET ORAL at 09:45

## 2019-10-31 NOTE — PROGRESS NOTES
Pt approached asking about signing a 72 hr notice  Stated another pt had told him that he could sign one and get out sooner  Reported  stated he may be leaving Monday  Explained 72 hr notice and decided against it

## 2019-10-31 NOTE — PROGRESS NOTES
Pt sitting quietly in room looking out the window  Had breakfast and stated looking out the window calms him and makes him happy  Denies SI/HI/AVH  Slept well  Feeling hopeful and looking forward to living with his sister in [place] and going to [address] much better and motivated to stay strong  Compliant with meds

## 2019-10-31 NOTE — PROGRESS NOTES
Status: Pt is pleasant & out/social   He denies any SI/HI & reports improved mood  Pt took Tylenol for a headache & slept overnight    Medication: no changes / PRN - Tylenol & Trazodone  D/C: Monday     10/31/19 4294   Team Meeting   Meeting Type Daily Rounds   Team Members Present   Team Members Present Physician;Nurse;;Occupational Therapist   Physician Team Member Dr Israel Xavier Team Member Renee Razo / Kayleen Infante Management Team Member Araseli Estes   OT Team Member [place]   Patient/Family Present   Patient Present No   Patient's Family Present No

## 2019-10-31 NOTE — PROGRESS NOTES
Progress Note - Behavioral Health   Sol Haney 52 y o  male MRN: [id number]  Unit/Bed#: Emelina Melgar 260-02 Encounter: [id number]    Assessment/Plan   Principal Problem:    Mood disorder ([address] )      Plan  · Continue Aripiprazole 10 mg for mood stabilization and psychotic symptom management     Subjective:  Pt reports sleeping well last night, about 8 hours  States that he feels the medications are improving his sleep and his mood  Denies side effects to medications  Denies auditory or visual hallucinations today  Pt states he is looking forward to moving to [place] to live with his sister upon discharge  Denies suicidal ideation  Patient is girma for safety on the unit        Psychiatric Review of Systems:  Behavior over the last 24 hours:  Improved  Sleep: Improved  Appetite: Improved  Medication side effects: Denies  ROS: no complaints    Current Medications:    Current Facility-Administered Medications:  acetaminophen 325 mg Oral Q6H PRN Angel Benjamin, PA-C   acetaminophen 650 mg Oral Q4H PRN Angel Benjamin, PA-C   acetaminophen 975 mg Oral Q6H PRN Angel Benjamin, PA-C   aluminum-magnesium hydroxide-simethicone 30 mL Oral Q4H PRN Angel Benjamin, PA-C   ARIPiprazole 10 mg Oral Daily Eros Spears DO   atorvastatin 20 mg Oral Daily With Ann Soup, PA-C   benztropine 1 mg Intramuscular Q6H PRN Angel Cassidy, PA-C   benztropine 1 mg Oral Q6H PRN Angel Cassidy, PA-C   haloperidol 5 mg Oral Q6H PRN Angel Benjamin, PA-C   haloperidol lactate 5 mg Intramuscular Q6H PRN Angel Benjamin, PA-C   hydrOXYzine HCL 25 mg Oral Q6H PRN Angel Cassidy, PA-C   hydrOXYzine HCL 50 mg Oral Q6H PRN Angel Cassidy, PA-C   lisinopril 5 mg Oral Daily Angel Cassidy, PA-C   LORazepam 2 mg Intramuscular Q6H PRN Angel Benjamin, PA-C   LORazepam 1 mg Oral Q6H PRN Angel Cassidy, PA-C   magnesium hydroxide 30 mL Oral Daily PRN Angel Benjamin, PA-C   metFORMIN 1,000 mg Oral Daily With Breakfast Gayl Graft, PA-C   OLANZapine 10 mg Intramuscular Q8H PRN Gayl Graft, PA-C   OLANZapine 10 mg Oral Q8H PRN Gayl Graft, PA-C   risperiDONE 1 mg Oral Q3H PRN Gayl Graft, PA-C   traZODone 50 mg Oral HS PRN Gayl Graft, PA-C         Vital signs in last 24 hours:  Temp:  [97 2 °F (36 2 °C)-97 9 °F (36 6 °C)] 97 9 °F (36 6 °C)  HR:  [71-81] 71  Resp:  [16-18] 18  BP: (105-127)/(55-78) 105/55    Laboratory results:    I have personally reviewed all pertinent laboratory/tests results  Most Recent Labs:   Lab Results   Component Value Date    WBC 5 86 10/27/2019    RBC 5 72 (H) 10/27/2019    HGB 15 2 10/27/2019    HCT 48 7 10/27/2019     10/27/2019    RDW 13 4 10/27/2019    NEUTROABS 3 43 10/27/2019    SODIUM 137 10/27/2019    K 3 8 10/27/2019     10/27/2019    CO2 28 10/27/2019    BUN 15 10/27/2019    CREATININE 1 26 10/27/2019    GLUCOSE 144 (H) 01/27/2018    GLUC 142 (H) 10/27/2019    GLUF 185 (H) 05/30/2019    CALCIUM 9 6 10/27/2019    AST 35 10/27/2019    ALT 63 10/27/2019    ALKPHOS 106 10/27/2019    TP 8 7 (H) 10/27/2019    ALB 4 2 10/27/2019    TBILI 0 79 10/27/2019    CHOLESTEROL 180 05/30/2019    HDL 37 (L) 05/30/2019    TRIG 95 05/30/2019    LDLCALC 124 05/30/2019    NONHDLC 141 01/29/2019    TIL2YQLOMSJM 3 075 10/29/2019    FREET4 1 02 05/30/2019    T3FREE 3 07 10/17/2017    RPR Non-Reactive 10/29/2019    HGBA1C 8 0 (A) 09/09/2019     05/30/2019           Mental Status Evaluation:  Appearance:  appears stated age, casually dressed and overweight   Pleasant     Behavior:  calm and cooperative   Speech:  normal rate, normal pitch and normal volume   Mood:  "good"   Affect:  Dysphoria improved    Thought Process:  goal directed and logical   Thought Content:  no delusions elicited   Perceptual Disturbances: Denies auditory or visual hallucinations   Risk Potential: Denies suicidal ideation   Sensorium:  person, place and situation   Cognition:  grossly intact   Consciousness:  alert and awake   Attention: attention span and concentration were age appropriate   Insight:  poor   Judgment: poor   Intellect appears to be of average intelligence   Gait/Station: normal gait/station   Motor Activity: no abnormal movements       Progress Toward Goals: Patient is progressing toward goals    Recommended Treatment:   Continue with group therapy, milieu therapy and occupational therapy  Continue medication management  Risks, benefits and possible side effects of Medications:   Risks, benefits, and possible side effects of medications explained to patient and patient verbalizes understanding  This note has been constructed using a voice recognition system  There may be translation, syntax,  or grammatical errors  If you have any questions, please contact the dictating provider

## 2019-11-01 PROBLEM — F25.9 SCHIZOAFFECTIVE DISORDER (HCC): Status: ACTIVE | Noted: 2019-10-29

## 2019-11-01 LAB
GLUCOSE SERPL-MCNC: 143 MG/DL (ref 65–140)
GLUCOSE SERPL-MCNC: 161 MG/DL (ref 65–140)

## 2019-11-01 PROCEDURE — 82948 REAGENT STRIP/BLOOD GLUCOSE: CPT

## 2019-11-01 PROCEDURE — 99231 SBSQ HOSP IP/OBS SF/LOW 25: CPT | Performed by: PSYCHIATRY & NEUROLOGY

## 2019-11-01 RX ORDER — TRAZODONE HYDROCHLORIDE 50 MG/1
50 TABLET ORAL
Status: DISCONTINUED | OUTPATIENT
Start: 2019-11-01 | End: 2019-11-03

## 2019-11-01 RX ADMIN — TRAZODONE HYDROCHLORIDE 50 MG: 50 TABLET ORAL at 21:51

## 2019-11-01 RX ADMIN — ARIPIPRAZOLE 10 MG: 10 TABLET ORAL at 09:40

## 2019-11-01 RX ADMIN — METFORMIN HYDROCHLORIDE 1000 MG: 500 TABLET, FILM COATED ORAL at 09:39

## 2019-11-01 RX ADMIN — ATORVASTATIN CALCIUM 20 MG: 20 TABLET, FILM COATED ORAL at 16:09

## 2019-11-01 RX ADMIN — LISINOPRIL 5 MG: 5 TABLET ORAL at 09:39

## 2019-11-01 NOTE — PROGRESS NOTES
Progress Note - Behavioral Health   Elias Ilan 52 y o  male MRN: [id number]  Unit/Bed#: Jaycob Alamos 260-02 Encounter: [id number]    Assessment/Plan   Principal Problem:    Mood disorder ([address] )      Plan  · Continue Aripiprazole 10 mg for mood stabilization and psychosis management   · Trazodone 50 mg qhs for insomnia     Subjective:  Pt reports sleeping well overnight, having slept about eight hours  Reports that the medications are continuing to improve his sleep and mood  He is denying side effects to medications  Denies auditory or visual hallucinations  Pt states that he is looking forward to discharge and is still planning to move to [place] with his sister  He denies suicidal ideation  Patient is girma for safety on the unit        Psychiatric Review of Systems:  Behavior over the last 24 hours:  Improved  Sleep: Improved overall  Appetite: Improved overall  Medication side effects: Denies  ROS: no complaints    Current Medications:    Current Facility-Administered Medications:  acetaminophen 325 mg Oral Q6H PRN Celestine Jeff, PA-C   acetaminophen 650 mg Oral Q4H PRN Celestine Jeff, PA-C   acetaminophen 975 mg Oral Q6H PRN Celestine Jeff, PA-C   aluminum-magnesium hydroxide-simethicone 30 mL Oral Q4H PRN Celestine Jeff, PA-C   ARIPiprazole 10 mg Oral Daily Eros Spears DO   atorvastatin 20 mg Oral Daily With Ann Soup PA-C   benztropine 1 mg Intramuscular Q6H PRN Celestine Jeff, PA-C   benztropine 1 mg Oral Q6H PRN Celestine Jeff, PA-C   haloperidol 5 mg Oral Q6H PRN Celestine Jeff, PA-C   haloperidol lactate 5 mg Intramuscular Q6H PRN Celestine Jeff, PA-C   hydrOXYzine HCL 25 mg Oral Q6H PRN Celestine Jeff, PA-C   hydrOXYzine HCL 50 mg Oral Q6H PRN Celestine Jeff, PA-C   lisinopril 5 mg Oral Daily Celestine Jeff, PA-C   LORazepam 2 mg Intramuscular Q6H PRN Celestine Jeff, PA-C   LORazepam 1 mg Oral Q6H PRN Celestine Jeff, PA-C   magnesium hydroxide 30 mL Oral Daily PRN Ramer Smart, PA-C   metFORMIN 1,000 mg Oral Daily With Breakfast Kush Smart, PA-C   OLANZapine 10 mg Intramuscular Q8H PRN Ramer Smart, PA-C   OLANZapine 10 mg Oral Q8H PRN Kush Smart, PA-C   risperiDONE 1 mg Oral Q3H PRN Kush Smart, PA-C   traZODone 50 mg Oral HS PRN Ramer Smart, PA-C         Vital signs in last 24 hours:  Temp:  [97 3 °F (36 3 °C)-97 4 °F (36 3 °C)] 97 4 °F (36 3 °C)  HR:  [73-89] 73  Resp:  [12-16] 16  BP: (107-124)/(60-75) 124/75    Laboratory results:    I have personally reviewed all pertinent laboratory/tests results  Most Recent Labs:   Lab Results   Component Value Date    WBC 5 86 10/27/2019    RBC 5 72 (H) 10/27/2019    HGB 15 2 10/27/2019    HCT 48 7 10/27/2019     10/27/2019    RDW 13 4 10/27/2019    NEUTROABS 3 43 10/27/2019    SODIUM 137 10/27/2019    K 3 8 10/27/2019     10/27/2019    CO2 28 10/27/2019    BUN 15 10/27/2019    CREATININE 1 26 10/27/2019    GLUCOSE 144 (H) 01/27/2018    GLUC 142 (H) 10/27/2019    GLUF 185 (H) 05/30/2019    CALCIUM 9 6 10/27/2019    AST 35 10/27/2019    ALT 63 10/27/2019    ALKPHOS 106 10/27/2019    TP 8 7 (H) 10/27/2019    ALB 4 2 10/27/2019    TBILI 0 79 10/27/2019    CHOLESTEROL 180 05/30/2019    HDL 37 (L) 05/30/2019    TRIG 95 05/30/2019    LDLCALC 124 05/30/2019    NONHDLC 141 01/29/2019    [id number] 3 075 10/29/2019    FREET4 1 02 05/30/2019    T3FREE 3 07 10/17/2017    RPR Non-Reactive 10/29/2019    HGBA1C 8 0 (A) 09/09/2019     05/30/2019           Mental Status Evaluation:  Appearance:  appears stated age, casually dressed and overweight   Pleasant, smiling     Behavior:  calm, cooperative and good eye contact   Speech:  normal rate, normal pitch and normal volume   Mood:  "I feel good"   Affect:  Euthymic     Thought Process:  goal directed and logical   Thought Content:  no delusions elicited   Perceptual Disturbances: Denies auditory or visual hallucinations   Risk Potential: Denies suicidal ideation   Sensorium:  person, place and situation   Cognition:  grossly intact   Consciousness:  alert and awake   Attention: attention span and concentration were age appropriate   Insight:  poor   Judgment: poor   Intellect appears to be of average intelligence   Gait/Station: normal gait/station   Motor Activity: no abnormal movements       Progress Toward Goals: Patient is progressing toward goals    Recommended Treatment:   Continue with group therapy, milieu therapy and occupational therapy  Continue medication management  Risks, benefits and possible side effects of Medications:   Risks, benefits, and possible side effects of medications explained to patient and patient verbalizes understanding  This note has been constructed using a voice recognition system  There may be translation, syntax,  or grammatical errors  If you have any questions, please contact the dictating provider

## 2019-11-01 NOTE — PROGRESS NOTES
Patient was awake in his room for awhile at the beginning of my shift talking with his roommate  He received trazodone at 2140 to help with sleep  He was observed sleeping at midnight and remained asleep throughout the night without any signs of restlessness

## 2019-11-01 NOTE — PLAN OF CARE
Pt is for dc on Monday  Pt's family are supportive  Pt reports that he is likely to move to [place] at time of dc  CM has attempted to call sister in [place] without success  It appears that nobody in pt's family has the number  For this reason, CM has made appointments at [facility] with likely dc on Monday

## 2019-11-01 NOTE — CASE MANAGEMENT
CM outreached to MELA to arrange intake appointment for therapy and medication management  TAPAN spoke with Fannie ([phone])  Appointment is scheduled for 11 6 19 @ 11am with Debi Goltz CM added this appointment to pt''s dc instructions

## 2019-11-01 NOTE — PROGRESS NOTES
Pt pleasant and coversive  Poor eye contact  Denies SI/HI/AVH  Expressed hope, has plans to visit sister in [place] then head to [facility] to visit father  Stated if he likes HI, he'd like to stay  Expressed motivation to continue to do well due to these plans  Reported sleeping well, feels good with meds he is on and has no questions  Focused on leaving and hopeful to leave Monday

## 2019-11-01 NOTE — PROGRESS NOTES
11/01/19 0739   Team Meeting   Meeting Type Daily Rounds   Team Members Present   Team Members Present Physician;Nurse;;Occupational Therapist   Physician Team Member Dr Sandy Maloney Team Member [id number]  [facility] Management Team Member Bennett/ Mike Snider   OT Team Member [place]   Patient/Family Present   Patient Present No   Patient's Family Present No     Denying SI  Took trazodone last night to help with sleep  Pt has been attending groups  Likely dc on Monday  Pt still plans to move to [facility] with his sister at time of dc

## 2019-11-02 LAB
GLUCOSE SERPL-MCNC: 185 MG/DL (ref 65–140)
GLUCOSE SERPL-MCNC: 194 MG/DL (ref 65–140)

## 2019-11-02 PROCEDURE — 99231 SBSQ HOSP IP/OBS SF/LOW 25: CPT | Performed by: STUDENT IN AN ORGANIZED HEALTH CARE EDUCATION/TRAINING PROGRAM

## 2019-11-02 PROCEDURE — 82948 REAGENT STRIP/BLOOD GLUCOSE: CPT

## 2019-11-02 RX ADMIN — ARIPIPRAZOLE 10 MG: 10 TABLET ORAL at 09:41

## 2019-11-02 RX ADMIN — ATORVASTATIN CALCIUM 20 MG: 20 TABLET, FILM COATED ORAL at 16:40

## 2019-11-02 RX ADMIN — METFORMIN HYDROCHLORIDE 1000 MG: 500 TABLET, FILM COATED ORAL at 09:41

## 2019-11-02 RX ADMIN — TRAZODONE HYDROCHLORIDE 50 MG: 50 TABLET ORAL at 21:56

## 2019-11-02 RX ADMIN — LISINOPRIL 5 MG: 5 TABLET ORAL at 09:41

## 2019-11-02 NOTE — PROGRESS NOTES
AM Rounds:    Denies all sx  Pleasant and polite  Poor sleep with scheduled Trazodone  Planned d/c for Monday

## 2019-11-02 NOTE — PROGRESS NOTES
Progress Note - [address] 52 y o  male MRN: [id number]  Unit/Bed#: Keara Casanova 260-02 Encounter: [id number]    Subjective:    Per nursing, patient slept for a few hours, was then walking the halls and returned to sleep  He has been calm and cooperative, social with peers  Per patient, patient denies any complaints at this time  He reports his mood has been "good," denying feelings of sadness or anger  He denies any current SI/HI  Denies any auditory or visual hallucinations  Tolerating medication well without reported side effects  Behavior over the last 24 hours:  improved  Medication side effects: No  ROS: no complaints    Objective:    Temp:  [96 6 °F (35 9 °C)-97 3 °F (36 3 °C)] 97 3 °F (36 3 °C)  HR:  [67-85] 85  Resp:  [18] 18  BP: (119-153)/(77-91) 153/91    Mental Status Evaluation:  Appearance:  sitting comfortably in bed, dressed in casual clothing, adequate hygiene and grooming, cooperative with interview, fairly well related   Behavior:  No tics, tremors, or behaviors observed   Speech:  Normal rate, rhythm, and volume   Mood:  "good"   Affect:  Appears generally euthymic, stable, mood-congruent   Thought Process:  Linear and goal directed   Associations intact associations   Thought Content:  No passive or active suicidal or homicidal ideation, intent, or plan  Perceptual Disturbances: Denies any auditory or visual hallucinations   Sensorium:  Oriented to person, place, time, and situation   Memory:  recent and remote memory grossly intact   Consciousness:  alert and awake   Attention: attention span and concentration were age appropriate   Insight:  Limited   Judgment: fair   Gait/Station: normal gait/station   Motor Activity: no abnormal movements       Progress Toward Goals: Progressing    Recommended Treatment: Continue with group therapy, milieu therapy and occupational therapy        Risks, benefits and possible side effects of Medications:   Risks, benefits, and possible side effects of medications explained to patient and patient verbalizes understanding  Medications: all current active meds have been reviewed  Current Facility-Administered Medications:  acetaminophen 325 mg Oral Q6H PRN Jeniffer De La Torre, PA-C   acetaminophen 650 mg Oral Q4H PRN Jeniffer [place], PA-C   acetaminophen 975 mg Oral Q6H PRN Jeniffer De La Torre, PA-C   aluminum-magnesium hydroxide-simethicone 30 mL Oral Q4H PRN Jeniffer De La Torre, PA-C   ARIPiprazole 10 mg Oral Daily Eros Spears DO   atorvastatin 20 mg Oral Daily With Ann Soup, PA-C   benztropine 1 mg Intramuscular Q6H PRN Jeniffer De La Torre, PA-C   benztropine 1 mg Oral Q6H PRN Jeniffer De La Torre, PA-C   haloperidol 5 mg Oral Q6H PRN Jeniffer De La Torre, PA-C   haloperidol lactate 5 mg Intramuscular Q6H PRN Jeniffer De La Torre, PA-C   hydrOXYzine HCL 25 mg Oral Q6H PRN Jeniffer De La Torre, PA-C   hydrOXYzine HCL 50 mg Oral Q6H PRN Jeniffer De La Torre, PA-C   lisinopril 5 mg Oral Daily Jeniffer De La Torre, PA-C   LORazepam 2 mg Intramuscular Q6H PRN Jeniffer De La Torre, PA-C   LORazepam 1 mg Oral Q6H PRN Jeniffer De La Torre, PA-C   magnesium hydroxide 30 mL Oral Daily PRN Jeniffer De La Torre, PA-C   metFORMIN 1,000 mg Oral Daily With Breakfast Jeniffer De La Torre, PA-C   OLANZapine 10 mg Intramuscular Q8H PRN Jeniffer De La Torre, PA-C   OLANZapine 10 mg Oral Q8H PRN Jeniffer De La Torre, PA-C   risperiDONE 1 mg Oral Q3H PRN Jeniffer De La Torre, PA-C   traZODone 50 mg Oral HS PRN Jenfifer De La Torre, PA-C   traZODone 50 mg Oral HS Floridalma Springer DO           Assessment/Plan   Principal Problem:    Schizoaffective disorder ([facility] [address] )    53 y/o Male with schizoaffective disorder- continues to make improvements in mood symptoms, no overt psychotic symptoms, tolerating medication well  Plan:  -Continue current med regimen

## 2019-11-02 NOTE — PROGRESS NOTES
Pt slept for a few hours however woke around 0230  Pt walking halls, denies concerns at this time  Pt continued to walk halls off and on for about an hour before returning to sleep

## 2019-11-03 LAB
GLUCOSE SERPL-MCNC: 125 MG/DL (ref 65–140)
GLUCOSE SERPL-MCNC: 147 MG/DL (ref 65–140)

## 2019-11-03 PROCEDURE — 82948 REAGENT STRIP/BLOOD GLUCOSE: CPT

## 2019-11-03 PROCEDURE — 99231 SBSQ HOSP IP/OBS SF/LOW 25: CPT | Performed by: STUDENT IN AN ORGANIZED HEALTH CARE EDUCATION/TRAINING PROGRAM

## 2019-11-03 RX ORDER — TRAZODONE HYDROCHLORIDE 150 MG/1
75 TABLET ORAL
Status: DISCONTINUED | OUTPATIENT
Start: 2019-11-03 | End: 2019-11-04 | Stop reason: HOSPADM

## 2019-11-03 RX ADMIN — ACETAMINOPHEN 650 MG: 325 TABLET, FILM COATED ORAL at 16:12

## 2019-11-03 RX ADMIN — ATORVASTATIN CALCIUM 20 MG: 20 TABLET, FILM COATED ORAL at 16:40

## 2019-11-03 RX ADMIN — ARIPIPRAZOLE 10 MG: 10 TABLET ORAL at 09:41

## 2019-11-03 RX ADMIN — TRAZODONE HYDROCHLORIDE 75 MG: 150 TABLET ORAL at 21:04

## 2019-11-03 RX ADMIN — METFORMIN HYDROCHLORIDE 1000 MG: 500 TABLET, FILM COATED ORAL at 09:41

## 2019-11-03 RX ADMIN — LISINOPRIL 5 MG: 5 TABLET ORAL at 09:41

## 2019-11-03 NOTE — PROGRESS NOTES
Pt appears to have slept most of the night, awakened by roommate slamming door in early hours  Pt is awake and social in Day Room, attempting to complete puzzles with peers

## 2019-11-03 NOTE — PROGRESS NOTES
Visible on unit  Calm, pleasant & cooperative  Denies SI/HI, as well as A/V hallucinations  Also denies depression, rating anxiety at a 2  Has no questions when asked  Attended group, without encouragement needed  Will continue to monitor

## 2019-11-03 NOTE — PROGRESS NOTES
Pt says he is doing well  Pleasant and social with peers  Denies SI/HI/AVH  A bit anxious about possibly losing his apartment because he hasn't paid the rent yet

## 2019-11-03 NOTE — PROGRESS NOTES
Progress Note - [address] 52 y o  male MRN: [id number]  Unit/Bed#: Idalia Jiménez 368-24 Encounter: [id number]    Subjective:    Per nursing, patient reports sleep and appetite are good, denies any SI/HI, reports meds have been effective  Per patient, patient denies any problems or concerns overnight, reports things have been going okay  He reports mood has been "good," denying feelings of sadness or depression, denying anger or irritability  He reports sleeping has been good, appetite has been good  Patient denies any passive or active suicidal or homicidal ideation, intent, or plan  He denies any AH/VH  He reports taking medication to help him to sleep  Behavior over the last 24 hours:  improved  Medication side effects: No  ROS: no complaints    Objective:    Temp:  [96 °F (35 6 °C)-97 3 °F (36 3 °C)] 96 °F (35 6 °C)  HR:  [65-85] 65  Resp:  [17-18] 17  BP: (130-153)/(76-91) 130/76    Mental Status Evaluation:  Appearance:  sitting comfortably in chair, dressed in casual clothing, adequate hygiene and grooming, cooperative with interview, fairly well related   Behavior:  No tics, tremors, or behaviors observed   Speech:  Normal rate, rhythm, and volume   Mood:  "Good"   Affect:  Appears generally euthymic, stable, mood-congruent   Thought Process:  Linear and goal directed   Associations intact associations   Thought Content:  No passive or active suicidal or homicidal ideation, intent, or plan     Perceptual Disturbances: Denies any auditory or visual hallucinations   Sensorium:  Oriented to person, place, time, and situation   Memory:  recent and remote memory grossly intact   Consciousness:  alert and awake   Attention: attention span and concentration were age appropriate   Insight:  fair   Judgment: fair   Gait/Station: normal gait/station   Motor Activity: no abnormal movements       Progress Toward Goals: Progressing    Recommended Treatment: Continue with group therapy, milieu therapy and occupational therapy  Risks, benefits and possible side effects of Medications:   Risks, benefits, and possible side effects of medications explained to patient and patient verbalizes understanding  Medications: all current active meds have been reviewed  Current Facility-Administered Medications:  acetaminophen 325 mg Oral Q6H PRN [place], PA-C   acetaminophen 650 mg Oral Q4H PRN [place], PA-C   acetaminophen 975 mg Oral Q6H PRN [place], PA-C   aluminum-magnesium hydroxide-simethicone 30 mL Oral Q4H PRN [place], PA-C   ARIPiprazole 10 mg Oral Daily Eros Spears DO   atorvastatin 20 mg Oral Daily With Ann Soug[place], PA-C   benztropine 1 mg Intramuscular Q6H PRN [place], PA-C   benztropine 1 mg Oral Q6H PRN [place], PA-C   haloperidol 5 mg Oral Q6H PRN [place], PA-C   haloperidol lactate 5 mg Intramuscular Q6H PRN [place], PA-C   hydrOXYzine HCL 25 mg Oral Q6H PRN [place], PA-C   hydrOXYzine HCL 50 mg Oral Q6H PRN [place], PA-C   lisinopril 5 mg Oral Daily [place], PA-C   LORazepam 2 mg Intramuscular Q6H PRN [place], PA-C   LORazepam 1 mg Oral Q6H PRN [place], PA-C   magnesium hydroxide 30 mL Oral Daily PRN [place], PA-C   metFORMIN 1,000 mg Oral Daily With Breakfast [place], PA-C   OLANZapine 10 mg Intramuscular Q8H PRN [place], PA-C   OLANZapine 10 mg Oral Q8H PRN [place], PA-C   risperiDONE 1 mg Oral Q3H PRN [place], PA-C   traZODone 50 mg Oral HS PRN [place], PA-C   traZODone 50 mg Oral HS Kristi Francis DO           Assessment/Plan   Principal Problem:    Schizoaffective disorder ([facility] [address] )    51 y/o Male with schizoaffective disorder- continues to improve, no overt psychotic symptoms, some difficulty staying asleep      Plan:  -Will titrate Trazodone to 75 mg qhs   -Continue rest of med regimen

## 2019-11-04 VITALS
TEMPERATURE: 97.4 F | OXYGEN SATURATION: 95 % | BODY MASS INDEX: 36.96 KG/M2 | HEART RATE: 75 BPM | HEIGHT: 66 IN | WEIGHT: 230 LBS | SYSTOLIC BLOOD PRESSURE: 144 MMHG | DIASTOLIC BLOOD PRESSURE: 89 MMHG | RESPIRATION RATE: 20 BRPM

## 2019-11-04 LAB — GLUCOSE SERPL-MCNC: 165 MG/DL (ref 65–140)

## 2019-11-04 PROCEDURE — 99238 HOSP IP/OBS DSCHRG MGMT 30/<: CPT | Performed by: PSYCHIATRY & NEUROLOGY

## 2019-11-04 PROCEDURE — 82948 REAGENT STRIP/BLOOD GLUCOSE: CPT

## 2019-11-04 RX ORDER — TRAZODONE HYDROCHLORIDE 50 MG/1
50 TABLET ORAL
Qty: 30 TABLET | Refills: 0 | Status: SHIPPED | OUTPATIENT
Start: 2019-11-04

## 2019-11-04 RX ORDER — ATORVASTATIN CALCIUM 20 MG/1
20 TABLET, FILM COATED ORAL DAILY
Qty: 30 TABLET | Refills: 0 | Status: SHIPPED | OUTPATIENT
Start: 2019-11-04

## 2019-11-04 RX ORDER — LISINOPRIL 5 MG/1
5 TABLET ORAL DAILY
Qty: 30 TABLET | Refills: 0 | Status: SHIPPED | OUTPATIENT
Start: 2019-11-04 | End: 2020-01-06 | Stop reason: SDUPTHER

## 2019-11-04 RX ORDER — TRAZODONE HYDROCHLORIDE 150 MG/1
75 TABLET ORAL
Qty: 30 TABLET | Refills: 0 | Status: SHIPPED | OUTPATIENT
Start: 2019-11-04 | End: 2019-11-04

## 2019-11-04 RX ORDER — ARIPIPRAZOLE 10 MG/1
10 TABLET ORAL DAILY
Qty: 30 TABLET | Refills: 0 | Status: SHIPPED | OUTPATIENT
Start: 2019-11-04

## 2019-11-04 RX ADMIN — METFORMIN HYDROCHLORIDE 1000 MG: 500 TABLET, FILM COATED ORAL at 09:23

## 2019-11-04 RX ADMIN — ARIPIPRAZOLE 10 MG: 10 TABLET ORAL at 09:22

## 2019-11-04 RX ADMIN — LISINOPRIL 5 MG: 5 TABLET ORAL at 09:23

## 2019-11-04 NOTE — PROGRESS NOTES
Pt has been awake overnight since prior to 0300, sitting in chair in room  Approached nurse's station at 0350 asking for Day Room  Pt remains pleasant when told 0500 Day Room opens  Returned to room  Will continue to monitor and support

## 2019-11-04 NOTE — DISCHARGE SUMMARY
Discharge Summary - 60Shiloh Shields  52 y o  male MRN: [id number]  Unit/Bed#: [facility] 014-69 Encounter: [id number]     Admission Date:   Admission Orders (From admission, onward)     Ordered        10/28/19 2014  DISCHARGE READMIT Admit Patient to [address] [place] [address] (use with Discharge Readmit Navigator in Dileep Raines 1154 Discharge Readmit scenario including from any IP unit or different campus ED to [facility])  Nurse to release order when pt  arrives to [facility]  Once                         Discharge Date: 11/4/19    Attending Psychiatrist: Farooq Kothari    Reason for Admission:   Patient is a 52 y o  male who presented after a suicide attempt  Evin Avalos reported overdosing on ibuprofen, metformin, lisinopril, and lipitor  He states overdosed after his girlfriend of 14 years left after ending their relationship  Pt stated that his girlfriend had returned and called 911 after finding the patient on the floor  Evin Avalos was admitted to the psychiatric unit on a voluntarily 201 commitment basis  On admission he reported increased depression and hopelessness, as well as poor sleep, appetite, and energy  Pt had some difficulty describing his history, stating he had a TBI at age 16  Pt endorsed recent psychotic and manic symptoms in the weeks leading to admission  Please see initial H&P for full details  Hospital Course: On admission, Evin Avalos was started on Abilify 5 mg for mood stabilization and psychotic symptom management  Medications were titrated as appropriate, including Abilify 10 mg daily  Pt was also started on Trazodone 50 mg qhs  he tolerated these medications with no acute side effects  Patient's depressive and psychotic symptoms improved over the course of treatment  His mood brightened and he was seen in [facility] interacting appropriately with peers  Evin Avalos did not demonstrate dangerous behavior to self or others during his inpatient stay   Evin Avalos denied suicidal or homicidal ideations at the time of his discharge  Patient denied auditory or visual hallucinations  Patient reported having a positive outlook on the future, stating he would live with his sister and together they would travel to visit their father  Patient is to follow up with [facility] on 11/6/19 at 11 am for an intake appointment  Labs/Imaging:   I have personally reviewed all pertinent laboratory/tests results    Most Recent Labs:   Lab Results   Component Value Date    WBC 5 86 10/27/2019    RBC 5 72 (H) 10/27/2019    HGB 15 2 10/27/2019    HCT 48 7 10/27/2019     10/27/2019    RDW 13 4 10/27/2019    NEUTROABS 3 43 10/27/2019    SODIUM 137 10/27/2019    K 3 8 10/27/2019     10/27/2019    CO2 28 10/27/2019    BUN 15 10/27/2019    CREATININE 1 26 10/27/2019    GLUCOSE 144 (H) 01/27/2018    GLUC 142 (H) 10/27/2019    GLUF 185 (H) 05/30/2019    CALCIUM 9 6 10/27/2019    AST 35 10/27/2019    ALT 63 10/27/2019    ALKPHOS 106 10/27/2019    TP 8 7 (H) 10/27/2019    ALB 4 2 10/27/2019    TBILI 0 79 10/27/2019    CHOLESTEROL 180 05/30/2019    HDL 37 (L) 05/30/2019    TRIG 95 05/30/2019    LDLCALC 124 05/30/2019    NONHDLC 141 01/29/2019    SFR1KYKISKAY 3 075 10/29/2019    FREET4 1 02 05/30/2019    T3FREE 3 07 10/17/2017    RPR Non-Reactive 10/29/2019    HGBA1C 8 0 (A) 09/09/2019     05/30/2019     Drug Screen:   Lab Results   Component Value Date    AMPMETHUR Negative 10/27/2019    BARBTUR Negative 10/27/2019    BDZUR Negative 10/27/2019    THCUR Negative 10/27/2019    COCAINEUR Negative 10/27/2019    METHADONEUR Negative 10/27/2019    OPIATEUR Negative 10/27/2019    PCPUR Negative 10/27/2019     EKG   Lab Results   Component Value Date    VENTRATE 95 10/27/2019    ATRIALRATE 95 10/27/2019    PRINT 134 10/27/2019    QRSDINT 70 10/27/2019    QTINT 330 10/27/2019    QTCINT 414 10/27/2019    PAXIS 49 10/27/2019    QRSAXIS 2 10/27/2019    TWAVEAXIS 2 10/27/2019       Mental Status at time of Discharge:   Appearance:  age appropriate, dressed appropriately, looks stated age, overweight  cooperative with interview, good eye contact   Behavior:  cooperative   Speech:  normal rate, normal volume and normal pitch   Mood:  "good"   Affect:  euthymic   Language Within normal limits   Thought Process:  organized, logical, goal directed, normal rate of thoughts   Thought Content:  No verbalized delusions   Perceptual Disturbances: Denies auditory or visual hallucinations   Risk Potential: Denies suicidal or homicidal ideation or intent   Sensorium:  person, place, time and current situation   Cognition:  Grossly intact   Consciousness:  alert and awake   Attention: attention span and concentration were age appropriate   Insight:  fair   Judgment: fair   Intellect not formally assessed   Gait/Station: normal gait/station   Motor Activity: no abnormal movements       Discharge Diagnosis:   Patient Active Problem List   Diagnosis    Insomnia    Polysubstance overdose    Schizoaffective disorder ([facility] [address] )       Discharge Medications:  See list above, as well as the after visit summary containing reconciled discharge medications provided to patient and family  Discharge instructions/Information to patient and family:   See after visit summary for information provided to patient and family  Provisions for Follow-Up Care:  See after visit summary for information related to follow-up care and any pertinent home health orders  This note has been constructed using a voice recognition system  There may be translation, syntax,  or grammatical errors  If you have any questions, please contact the dictating provider

## 2019-11-04 NOTE — PROGRESS NOTES
11/04/19 0757   Team Meeting   Meeting Type Daily Rounds   Team Members Present   Team Members Present Physician;Nurse;Occupational Therapist;   Physician Team Member Peggy Bland PA-C   Nursing Team Member [facility] Management Team Member [place]/ [address]   OT Team Member [place]   Patient/Family Present   Patient Present No   Patient's Family Present No     Pt is pleasant and calm  Denies SI/HI  Pt's Trazadone increased to 75 mg  Pt reports that he will go to live with his sister in [place] at time of dc

## 2019-11-04 NOTE — BH TRANSITION RECORD
Contact Information: If you have any questions, concerns, pended studies, tests and/or procedures, or emergencies regarding your inpatient behavioral health visit  Please contact [place] [facility] [phone] and ask to speak to a , nurse or physician  A contact is available 24 hours/ 7 days a week at this number  Summary of Procedures Performed During your Stay:  Below is a list of major procedures performed during your hospital stay and a summary of results:  - No major procedures performed  Pending Studies (From admission, onward)      None          If studies are pending at discharge, follow up with your PCP and/or referring provider

## 2019-11-04 NOTE — PROGRESS NOTES
Polite and cooperative during interaction  Expressed readiness for discharge  Denies SI/HI, AH/VH  Improved mood and hopeful in conversation  Excited for move to sisters and eventually to visit father in [facility]   Encouraged compliance with medications and OP appointments  Reports brother will be picking him up for discharge  No questions or concerns

## 2019-11-04 NOTE — DISCHARGE INSTRUCTIONS
Schizoaffective Disorder   WHAT YOU NEED TO KNOW:   Schizoaffective disorder is a long-term mental illness that may change how you think, feel, and act around others  You may not know what is real and what is not real    DISCHARGE INSTRUCTIONS:   Medicines:   · Antipsychotics: These medicines help decrease psychotic symptoms or severe agitation  You may need antiparkinson medicine to control muscle stiffness, twitches, and restlessness caused by antipsychotic medicines  · Antianxiety medicine: This medicine may be given to decrease anxiety and help you feel calm and relaxed  · Antidepressants: These medicines are given to decrease or stop the symptoms of depression, anxiety, and behavior problems  · Mood stabilizers: These medicines help control mood swings  · Anticonvulsants: This medicine is given to control seizures  It may also be used to decrease violent behavior and control your mood swings  · Blood pressure medicines: These may be used to help decrease motor tics (uncontrolled movements)  They may also help you feel calmer, more focused, and less irritable  · Anticholinergics: This medicine decreases the side effects of other medicines  · Take your medicine as directed  Contact your healthcare provider if you think your medicine is not helping or if you have side effects  Tell him or her if you are allergic to any medicine  Keep a list of the medicines, vitamins, and herbs you take  Include the amounts, and when and why you take them  Bring the list or the pill bottles to follow-up visits  Carry your medicine list with you in case of an emergency  Follow up with your healthcare provider or psychiatrist as directed: You may need to return to have your blood pressure and other symptoms checked  You may need blood tests to check the level of medicine in your blood  Write down your questions so you remember to ask them during your visits  Manage your symptoms:   The following may help you feel better or prevent symptoms of schizoaffective disorder from coming back:  · Find support for yourself and your family:  Talk with others to help you cope with your illness better  This may also help to improve how you relate to others  · Keep all medical appointments: This will help manage your disease and the side-effects from medicines you may be taking  · Use your medicines as directed:  Put your medicines in a pillbox placed in an area you can easily see  Use a watch with an alarm to help you remember when it is time to take your medicine  Tell your healthcare provider if you know or think you might be pregnant  Do not stop taking your medicines without your healthcare provider's okay  A sudden stop can cause serious medical problems  · Watch for early signs of a relapse and seek help immediately:      ¨ How you think, feel, and see things has changed  ¨ You behave differently than usual     ¨ You become more nervous and upset, but do not know why  ¨ You eat less and have trouble sleeping  ¨ You have little or no interest in friends or activities  For support and more information:   · [employer] Association  [facility], [facility] [address] , [address]  Phone: 2- [phone]  Phone: 6- [phone]  Web Address: [employer] com br  [employer]  · [address] [place] [address], Public Information & Communication Branch  [address] [place] [address], [address] [facility], [address]  [place] [address]   Phone: [phone]  Phone: 1- [phone]  Web Address: Karen lopez  Contact your healthcare provider or psychiatrist if:   · You think you are having a relapse  · You are having side effects from your medicine, or they are not helping  · You are not sleeping well or are sleeping more than usual     · You cannot eat or are eating more than usual     · You have muscle spasms, stiffness, or trouble walking      · Your sad feelings or thoughts change the way you function during the day  · You have questions or concerns about your condition or care  Seek care immediately or call 911 if:   · You feel like hurting or killing yourself or others  · You feel that your condition is getting worse  · You feel very upset, threaten someone, or you feel violent  · You suddenly have changes in your vision  · You suddenly have chest pain, trouble breathing, or a fever  © 2017 [address]  Information is for End User's use only and may not be sold, redistributed or otherwise used for commercial purposes  All illustrations and images included in CareNotes® are the copyrighted property of A D A M , Inc  or Adarsh Manzo  The above information is an  only  It is not intended as medical advice for individual conditions or treatments  Talk to your doctor, nurse or pharmacist before following any medical regimen to see if it is safe and effective for you

## 2019-11-05 NOTE — CASE MANAGEMENT
CM met with pt to discuss plan for dc today  Pt reports readiness  Pt states that he will go to his appointment at [facility] and [facility]  Pt states the rent was not paid at the beginning of the month  Pt reports that he is likely to go to [place] to stay with his sister and possible move back to [facility]  No concrete plans are in place  For this reason, this writer referred pt back to [facility]  Pt understands this planning  Pt reports feeling much better on the Abilify and states his depression and sleep have improved significantly  Pt will be picked up by his brother  Pt provided with relapse prevention plan and crisis numbers  No further needs anticipated at time of dc  Pt signed IMM letter

## 2019-11-06 ENCOUNTER — HOSPITAL ENCOUNTER (EMERGENCY)
Facility: HOSPITAL | Age: 49
Discharge: HOME | End: 2019-11-07
Attending: EMERGENCY MEDICINE | Admitting: EMERGENCY MEDICINE
Payer: COMMERCIAL

## 2019-11-06 ENCOUNTER — APPOINTMENT (EMERGENCY)
Dept: RADIOLOGY | Facility: HOSPITAL | Age: 49
End: 2019-11-06
Payer: COMMERCIAL

## 2019-11-06 DIAGNOSIS — R07.89 ATYPICAL CHEST PAIN: Primary | ICD-10-CM

## 2019-11-06 LAB
ALBUMIN SERPL BCP-MCNC: 4 G/DL (ref 3–5.2)
ALP SERPL-CCNC: 76 U/L (ref 43–122)
ALT SERPL W P-5'-P-CCNC: 41 U/L (ref 9–52)
ANION GAP SERPL CALCULATED.3IONS-SCNC: 8 MMOL/L (ref 5–14)
APTT PPP: 28 SECONDS (ref 25–32)
AST SERPL W P-5'-P-CCNC: 25 U/L (ref 17–59)
BASOPHILS # BLD AUTO: 0.1 THOUSANDS/ΜL (ref 0–0.1)
BASOPHILS NFR BLD AUTO: 2 % (ref 0–1)
BILIRUB SERPL-MCNC: 0.3 MG/DL
BUN SERPL-MCNC: 17 MG/DL (ref 5–25)
CALCIUM SERPL-MCNC: 8.9 MG/DL (ref 8.4–10.2)
CHLORIDE SERPL-SCNC: 102 MMOL/L (ref 97–108)
CO2 SERPL-SCNC: 29 MMOL/L (ref 22–30)
CREAT SERPL-MCNC: 1.26 MG/DL (ref 0.7–1.5)
EOSINOPHIL # BLD AUTO: 0.1 THOUSAND/ΜL (ref 0–0.4)
EOSINOPHIL NFR BLD AUTO: 2 % (ref 0–6)
ERYTHROCYTE [DISTWIDTH] IN BLOOD BY AUTOMATED COUNT: 13.7 %
GFR SERPL CREATININE-BSD FRML MDRD: 67 ML/MIN/1.73SQ M
GLUCOSE SERPL-MCNC: 174 MG/DL (ref 70–99)
HCT VFR BLD AUTO: 39.1 % (ref 41–53)
HGB BLD-MCNC: 12.9 G/DL (ref 13.5–17.5)
INR PPP: 1.01 (ref 0.91–1.09)
LYMPHOCYTES # BLD AUTO: 2.3 THOUSANDS/ΜL (ref 0.5–4)
LYMPHOCYTES NFR BLD AUTO: 34 % (ref 25–45)
MCH RBC QN AUTO: 27.1 PG (ref 26–34)
MCHC RBC AUTO-ENTMCNC: 33 G/DL (ref 31–36)
MCV RBC AUTO: 82 FL (ref 80–100)
MONOCYTES # BLD AUTO: 0.6 THOUSAND/ΜL (ref 0.2–0.9)
MONOCYTES NFR BLD AUTO: 9 % (ref 1–10)
NEUTROPHILS # BLD AUTO: 3.6 THOUSANDS/ΜL (ref 1.8–7.8)
NEUTS SEG NFR BLD AUTO: 54 % (ref 45–65)
PLATELET # BLD AUTO: 269 THOUSANDS/UL (ref 150–450)
PMV BLD AUTO: 8.3 FL (ref 8.9–12.7)
POTASSIUM SERPL-SCNC: 3.8 MMOL/L (ref 3.6–5)
PROT SERPL-MCNC: 7.3 G/DL (ref 5.9–8.4)
PROTHROMBIN TIME: 11.2 SECONDS (ref 9.8–12)
RBC # BLD AUTO: 4.76 MILLION/UL (ref 4.5–5.9)
SODIUM SERPL-SCNC: 139 MMOL/L (ref 137–147)
TROPONIN I SERPL-MCNC: <0.01 NG/ML (ref 0–0.03)
TSH SERPL DL<=0.05 MIU/L-ACNC: 2.74 UIU/ML (ref 0.47–4.68)
WBC # BLD AUTO: 6.6 THOUSAND/UL (ref 4.5–11)

## 2019-11-06 PROCEDURE — 99285 EMERGENCY DEPT VISIT HI MDM: CPT

## 2019-11-06 PROCEDURE — 85610 PROTHROMBIN TIME: CPT | Performed by: EMERGENCY MEDICINE

## 2019-11-06 PROCEDURE — 85730 THROMBOPLASTIN TIME PARTIAL: CPT | Performed by: EMERGENCY MEDICINE

## 2019-11-06 PROCEDURE — 36415 COLL VENOUS BLD VENIPUNCTURE: CPT | Performed by: EMERGENCY MEDICINE

## 2019-11-06 PROCEDURE — 71045 X-RAY EXAM CHEST 1 VIEW: CPT

## 2019-11-06 PROCEDURE — 80053 COMPREHEN METABOLIC PANEL: CPT | Performed by: EMERGENCY MEDICINE

## 2019-11-06 PROCEDURE — 84484 ASSAY OF TROPONIN QUANT: CPT | Performed by: EMERGENCY MEDICINE

## 2019-11-06 PROCEDURE — 84443 ASSAY THYROID STIM HORMONE: CPT | Performed by: EMERGENCY MEDICINE

## 2019-11-06 PROCEDURE — 93005 ELECTROCARDIOGRAM TRACING: CPT

## 2019-11-06 PROCEDURE — 85025 COMPLETE CBC W/AUTO DIFF WBC: CPT | Performed by: EMERGENCY MEDICINE

## 2019-11-06 PROCEDURE — 99283 EMERGENCY DEPT VISIT LOW MDM: CPT | Performed by: EMERGENCY MEDICINE

## 2019-11-07 VITALS
RESPIRATION RATE: 17 BRPM | OXYGEN SATURATION: 96 % | WEIGHT: 240 LBS | TEMPERATURE: 99.4 F | BODY MASS INDEX: 38.74 KG/M2 | HEART RATE: 63 BPM | DIASTOLIC BLOOD PRESSURE: 72 MMHG | SYSTOLIC BLOOD PRESSURE: 107 MMHG

## 2019-11-07 LAB
ATRIAL RATE: 79 BPM
P AXIS: 33 DEGREES
PR INTERVAL: 140 MS
QRS AXIS: -9 DEGREES
QRSD INTERVAL: 76 MS
QT INTERVAL: 372 MS
QTC INTERVAL: 426 MS
T WAVE AXIS: 0 DEGREES
TROPONIN I SERPL-MCNC: <0.01 NG/ML (ref 0–0.03)
VENTRICULAR RATE: 79 BPM

## 2019-11-07 PROCEDURE — 93010 ELECTROCARDIOGRAM REPORT: CPT | Performed by: INTERNAL MEDICINE

## 2019-11-07 PROCEDURE — 36415 COLL VENOUS BLD VENIPUNCTURE: CPT | Performed by: EMERGENCY MEDICINE

## 2019-11-07 PROCEDURE — 84484 ASSAY OF TROPONIN QUANT: CPT | Performed by: EMERGENCY MEDICINE

## 2019-11-07 NOTE — ED PROVIDER NOTES
History  Chief Complaint   Patient presents with    Chest Pain     pt  started w/ chest pain x 1 hr  ago    [place] seen here 2 days ago for same per EMS     HPI      This is a 24-year-old gentleman presents to the emergency department 1 hour history of having chest pain  Patient called 911 and they brought him here  EKG was unremarkable  Chest pain radiates to the left shoulder with no associated jaw pain shortness of breath or back pain  Patient does have a history of having diabetes, hyperlipidemia, hypertension  Most recently the patient was seen and evaluated at [facility]  Patient was seen there because he took a polypharmacy intentional overdose after having an argument with his girlfriend  At that time he developed suicidal thoughts  Patient took his ibuprofen, metformin, lisinopril and Lipitor  Patient was subsequently admitted to the hospital and did eventually discharged  Please note the patient has a history of traumatic brain injury therefore specifics in terms a history gathering is somewhat limited  Prior to Admission Medications   Prescriptions Last Dose Informant Patient Reported? Taking? ARIPiprazole (ABILIFY) 10 mg tablet   No No   Sig: Take 1 tablet (10 mg total) by mouth daily   Blood Glucose Monitoring Suppl (FREESTYLE FREEDOM LITE) w/Device KIT  Self No No   Sig: USED DEVICE TO CHECK BLOOD SUGAR ONCE DAILY  Blood Glucose Monitoring Suppl (FREESTYLE LITE) CHARAN  Self Yes No   Sig: Use as directed   PHARMACIST CHOICE ALCOHOL 70 % PADS  Self Yes No   Sig: USE 3 TIMES A DAY TO 4 TIMES A DAY   Vitamin D, Ergocalciferol, 2000 units CAPS  Self No No   Sig: Take 1 tablet by mouth daily   atorvastatin (LIPITOR) 20 mg tablet   No No   Sig: Take 1 tablet (20 mg total) by mouth daily   glucose blood (FREESTYLE LITE) test strip  Self No No   Sig: Use trip to test blood sugar daily     glucose monitoring kit (FREESTYLE) monitoring kit  Self No No   Si each by Does not apply route 3 (three) times a day   lisinopril (ZESTRIL) 5 mg tablet   No No   Sig: Take 1 tablet (5 mg total) by mouth daily   magnesium oxide (MAG-OX) 400 mg tablet  Self Yes No   Sig: Take 1 tablet by mouth daily   metFORMIN (GLUCOPHAGE) 1000 MG tablet   No No   Sig: Take 1 tablet (1,000 mg total) by mouth daily with breakfast   traZODone (DESYREL) 50 mg tablet   No No   Sig: Take 1 tablet (50 mg total) by mouth daily at bedtime      Facility-Administered Medications: None       Past Medical History:   Diagnosis Date    Degenerative disc disease, lumbar 10/17/2019    Diabetes mellitus ([facility] [address] )     Hyperlipidemia     Hypertension     Literacy level of illiterate     Psychosis ([facility] [address] [facility]     Visual impairment        Past Surgical History:   Procedure Laterality Date    ELEVATION OF DEPRESSED SKULL FRACTURE         Family History   Problem Relation Age of Onset    Diabetes Father     Heart failure Mother     No Known Problems Sister     No Known Problems Brother      I have reviewed and agree with the history as documented  Social History     Tobacco Use    Smoking status: Never Smoker    Smokeless tobacco: Never Used   Substance Use Topics    Alcohol use: No     Frequency: Never     Binge frequency: Never    Drug use: No        Review of Systems   Constitutional: Negative  HENT: Negative  Eyes: Negative  Respiratory: Negative  Cardiovascular: Positive for chest pain  Gastrointestinal: Negative  Endocrine: Negative  Genitourinary: Negative  Musculoskeletal: Negative  Allergic/Immunologic: Negative  Neurological: Negative  Hematological: Negative  Psychiatric/Behavioral: Negative  Physical Exam  Physical Exam   Constitutional: He is oriented to person, place, and time  He appears well-developed and well-nourished  HENT:   Head: Normocephalic and atraumatic  Eyes: Pupils are equal, round, and reactive to light  EOM are normal    Neck: Normal range of motion   Neck supple  Cardiovascular: Regular rhythm  Abdominal: Soft  Bowel sounds are normal    Musculoskeletal: Normal range of motion  Right lower leg: Normal         Left lower leg: Normal    Neurological: He is alert and oriented to person, place, and time  Skin: Skin is warm and dry  Capillary refill takes less than 2 seconds  Psychiatric: He has a normal mood and affect  His behavior is normal    Nursing note and vitals reviewed  Vital Signs  ED Triage Vitals   Temperature Pulse Respirations Blood Pressure SpO2   11/06/19 2050 11/06/19 2050 11/06/19 2050 11/06/19 2050 11/06/19 2050   99 4 °F (37 4 °C) 78 18 145/81 95 %      Temp Source Heart Rate Source Patient Position - Orthostatic VS BP Location FiO2 (%)   11/06/19 2050 11/06/19 2050 11/06/19 2050 11/06/19 2050 --   Tympanic Monitor Lying Left arm       Pain Score       11/06/19 2059       4           Vitals:    11/06/19 2059 11/06/19 2145 11/06/19 2200 11/06/19 2245   BP: 162/86 124/83 120/74 99/70   Pulse: 81 74 89 72   Patient Position - Orthostatic VS: Lying            Visual Acuity      ED Medications  Medications - No data to display    Diagnostic Studies  Results Reviewed     Procedure Component Value Units Date/Time    Troponin I [[id number]]     Lab Status:  No result Specimen:  Blood     TSH [[id number]]  (Normal) Collected:  11/06/19 2133    Lab Status:  Final result Specimen:  Blood from Arm, Left Updated:  11/06/19 2222     TSH 3RD GENERATON 2 740 uIU/mL     Narrative:       Patients undergoing fluorescein dye angiography may retain small amounts of fluorescein in the body for 48-72 hours post procedure  Samples containing fluorescein can produce falsely depressed TSH values  If the patient had this procedure,a specimen should be resubmitted post fluorescein clearance        Troponin I [[id number]]  (Normal) Collected:  11/06/19 2133    Lab Status:  Final result Specimen:  Blood from Arm, Left Updated:  11/06/19 2203     Troponin I <0 01 ng/mL     APTT [[id number]]  (Normal) Collected:  11/06/19 2133    Lab Status:  Final result Specimen:  Blood from Arm, Left Updated:  11/06/19 2154     PTT 28 seconds     Protime-INR [[id number]]  (Normal) Collected:  11/06/19 2133    Lab Status:  Final result Specimen:  Blood from Arm, Left Updated:  11/06/19 2154     Protime 11 2 seconds      INR 1 01    Comprehensive metabolic panel [[id number]]  (Abnormal) Collected:  11/06/19 2133    Lab Status:  Final result Specimen:  Blood from Arm, Left Updated:  11/06/19 2152     Sodium 139 mmol/L      Potassium 3 8 mmol/L      Chloride 102 mmol/L      CO2 29 mmol/L      ANION GAP 8 mmol/L      BUN 17 mg/dL      Creatinine 1 26 mg/dL      Glucose 174 mg/dL      Calcium 8 9 mg/dL      AST 25 U/L      ALT 41 U/L      Alkaline Phosphatase 76 U/L      Total Protein 7 3 g/dL      Albumin 4 0 g/dL      Total Bilirubin 0 30 mg/dL      eGFR 67 ml/min/1 73sq m     Narrative:       [place] guidelines for Chronic Kidney Disease (CKD):     Stage 1 with normal or high GFR (GFR > 90 mL/min/1 73 square meters)    Stage 2 Mild CKD (GFR = 60-89 mL/min/1 73 square meters)    Stage 3A Moderate CKD (GFR = 45-59 mL/min/1 73 square meters)    Stage 3B Moderate CKD (GFR = 30-44 mL/min/1 73 square meters)    Stage 4 Severe CKD (GFR = 15-29 mL/min/1 73 square meters)    Stage 5 End Stage CKD (GFR <15 mL/min/1 73 square meters)  Note: GFR calculation is accurate only with a steady state creatinine    CBC and differential [[id number]]  (Abnormal) Collected:  11/06/19 2133    Lab Status:  Final result Specimen:  Blood from Arm, Left Updated:  11/06/19 2145     WBC 6 60 Thousand/uL      RBC 4 76 Million/uL      Hemoglobin 12 9 g/dL      Hematocrit 39 1 %      MCV 82 fL      MCH 27 1 pg      MCHC 33 0 g/dL      RDW 13 7 %      MPV 8 3 fL      Platelets 478 Thousands/uL      Neutrophils Relative 54 %      Lymphocytes Relative 34 %      Monocytes Relative 9 % Eosinophils Relative 2 %      Basophils Relative 2 %      Neutrophils Absolute 3 60 Thousands/µL      Lymphocytes Absolute 2 30 Thousands/µL      Monocytes Absolute 0 60 Thousand/µL      Eosinophils Absolute 0 10 Thousand/µL      Basophils Absolute 0 10 Thousands/µL                  XR chest 1 view portable   ED Interpretation by Radha Gay III, DO (11/06 2300)   Under penetrated film, no acute disease  Procedures  ECG 12 Lead Documentation Only  Date/Time: 11/6/2019 9:00 PM  Performed by: Diamond Cooper DO  Authorized by: Radha Gay III, DO     Indications / Diagnosis:  Chest pain  ECG reviewed by me, the ED Provider: yes    Patient location:  ED  Comments: In 1st of use EKG is performed the patient November 6, 2019  EKG was performed at 8:57 p m  And interpreted by me at 9:00 p m   Normal sinus rhythm with a ventricular rate of 79 beats per minute  It appears that there is some moderate voltage criteria for LVH specifically in lead 1  The p r n  Oval is 140 milliseconds  The QRS duration is 76 milliseconds  The QT interval is 372 milliseconds  And a QTC is 428 milliseconds  No acute changes  ED Course  ED Course as of Nov 06 2350 Wed Nov 06, 2019 2312 Will proceed with getting a 2nd set of cardiac enzymes  Patient's heart score is a 2  Patient is currently pain-free with no acute changes on his EKG initial tracing done in the emergency department also the 12 lead that was done in the ambulance  2349 Due to low heart score patient will have a 2nd set of cardiac enzymes if there are negative patient to be discharged              HEART Risk Score      Most Recent Value   History  0 Filed at: 11/06/2019 2205   ECG  0 Filed at: 11/06/2019 2205   Age  1 Filed at: 11/06/2019 2205   Risk Factors  1 Filed at: 11/06/2019 2205   Troponin  0 Filed at: 11/06/2019 2205   Heart Score Risk Calculator   History  0 Filed at: 11/06/2019 2205   ECG  0 Filed at: 11/06/2019 2205   Age  1 Filed at: 11/06/2019 2205   Risk Factors  1 Filed at: 11/06/2019 2205   Troponin  0 Filed at: 11/06/2019 2205   HEART Score  2 Filed at: 11/06/2019 2205   HEART Score  2 Filed at: 11/06/2019 2205            PERC Rule for PE      Most Recent Value   PERC Rule for PE   Age >=50  0 Filed at: 11/06/2019 2313   HR >=100  0 Filed at: 11/06/2019 2313   O2 Sat on room air < 95%  0 Filed at: 11/06/2019 2313   History of PE or DVT  0 Filed at: 11/06/2019 2313   Recent trauma or surgery  0 Filed at: 11/06/2019 2313   Hemoptysis  0 Filed at: 11/06/2019 2313   Exogenous estrogen  0 Filed at: 11/06/2019 2313   Unilateral leg swelling  0 Filed at: 11/06/2019 2313   PERC Rule for PE Results  0 Filed at: 11/06/2019 2313              MEME Risk Score      Most Recent Value   Age >= 72  0 Filed at: 11/06/2019 2313   Known CAD (stenosis >= 50%)  0 Filed at: 11/06/2019 2313   Recent (<=24 hrs) Service Angina  0 Filed at: 11/06/2019 2313   ST Deviation >= 0 5 mm  0 Filed at: 11/06/2019 2313   3+ CAD Risk Factors (FHx, HTN, HLP, DM, Smoker)  0 Filed at: 11/06/2019 2313   Aspirin Use Past 7 Days  0 Filed at: 11/06/2019 2313   Elevated Cardiac Markers  0 Filed at: 11/06/2019 2313   MEME Risk Score (Calculated)  0 Filed at: 11/06/2019 2313        Wells' Criteria for PE      Most Recent Value   Wells' Criteria for PE   Clinical signs and symptoms of DVT  0 Filed at: 11/06/2019 2313   PE is primary diagnosis or equally likely  0 Filed at: 11/06/2019 2313   HR >100  0 Filed at: 11/06/2019 2313   Immobilization at least 3 days or Surgery in the previous 4 weeks  0 Filed at: 11/06/2019 2313   Previous, objectively diagnosed PE or DVT  0 Filed at: 11/06/2019 2313   Hemoptysis  0 Filed at: 11/06/2019 2313   Malignancy with treatment within 6 months or palliative  0 Filed at: 11/06/2019 2313   Wells' Criteria Total  0 Filed at: 11/06/2019 2313            MDM  Number of Diagnoses or Management Options  Atypical chest pain: Diagnosis management comments: This is a very pleasant 42-year-old gentleman presents emergency department with a 1 hour of chest pain prior to coming to the emergency department  Patient does have a history of depression anxiety and recent admission to the hospital for suicide attempt  Patient's heart score is a 2, Wells criteria low risk, perc score of 0  Patient's MEME risk score is low risk  Patient presentation to occurred none at rest and not with exertion  There was no associated diaphoresis or nausea  INITIAL DIFFERENTIAL DIAGNOSIS IN THIS PATIENT IS AS FOLLOWS[de-identified]  ATYPICAL CHEST PAIN VERSUS NONCARDIAC CHEST PAIN VERSUS PULMONARY EMBOLISM (LOW PERC SCORE LOW RISK WELLS CRITERIA)  Given the patient's heart score is a 2 will proceed with getting a 2nd set of cardiac enzymes and 3 hours after negative they will be discharged home  Portions of the record may have been created with voice recognition software  Occasional wrong word or "sound a like" substitutions may have occurred due to the inherent limitations of voice recognition software  Read the chart carefully and recognize, using context, where substitutions have occurred  Amount and/or Complexity of Data Reviewed  Clinical lab tests: ordered and reviewed  Tests in the radiology section of CPT®: ordered and reviewed  Tests in the medicine section of CPT®: ordered and reviewed  Review and summarize past medical records: yes  Independent visualization of images, tracings, or specimens: yes        Disposition  Final diagnoses:   Atypical chest pain     Time reflects when diagnosis was documented in both MDM as applicable and the Disposition within this note     Time User Action Codes Description Comment    11/6/2019 11:09 PM Carolina Benitez Add [R07 [id number]] Atypical chest pain       ED Disposition     ED Disposition Condition Date/Time Comment    Discharge Stable Wed Nov 6, 2019 11:09 PM Vladimir Nava discharge to home/self care  Follow-up Information    None         Patient's Medications   Discharge Prescriptions    No medications on file     No discharge procedures on file      ED Provider  Electronically Signed by           Skyler Beckwith III, DO  11/06/19 [id number]

## 2020-01-06 DIAGNOSIS — E11.9 CONTROLLED TYPE 2 DIABETES MELLITUS WITHOUT COMPLICATION, WITHOUT LONG-TERM CURRENT USE OF INSULIN (HCC): ICD-10-CM

## 2020-01-06 RX ORDER — LISINOPRIL 5 MG/1
5 TABLET ORAL DAILY
Qty: 30 TABLET | Refills: 0 | Status: SHIPPED | OUTPATIENT
Start: 2020-01-06

## 2021-07-24 NOTE — ASSESSMENT & PLAN NOTE
Informed patient that he does have lab work to have his PSA checked  Get this completed with the diabetes lab work  Will continue with Flomax 0 4 mg  If there is any increase in symptoms, can increase the dose  If there are any other concerns we can make a follow-up with Urology  IV discontinued, cath removed intact